# Patient Record
Sex: FEMALE | Race: WHITE | NOT HISPANIC OR LATINO | Employment: FULL TIME | URBAN - METROPOLITAN AREA
[De-identification: names, ages, dates, MRNs, and addresses within clinical notes are randomized per-mention and may not be internally consistent; named-entity substitution may affect disease eponyms.]

---

## 2017-01-05 ENCOUNTER — OFFICE VISIT (OUTPATIENT)
Dept: FAMILY MEDICINE | Facility: CLINIC | Age: 37
End: 2017-01-05
Payer: COMMERCIAL

## 2017-01-05 VITALS
WEIGHT: 157 LBS | HEART RATE: 65 BPM | DIASTOLIC BLOOD PRESSURE: 75 MMHG | BODY MASS INDEX: 29.64 KG/M2 | TEMPERATURE: 98.5 F | SYSTOLIC BLOOD PRESSURE: 126 MMHG | RESPIRATION RATE: 20 BRPM | HEIGHT: 61 IN

## 2017-01-05 DIAGNOSIS — Z00.00 ENCOUNTER FOR GENERAL ADULT MEDICAL EXAMINATION WITHOUT ABNORMAL FINDINGS: Primary | ICD-10-CM

## 2017-01-05 DIAGNOSIS — F41.1 GENERALIZED ANXIETY DISORDER: Chronic | ICD-10-CM

## 2017-01-05 DIAGNOSIS — Z12.4 SCREENING FOR MALIGNANT NEOPLASM OF CERVIX: ICD-10-CM

## 2017-01-05 DIAGNOSIS — F33.1 MAJOR DEPRESSIVE DISORDER, RECURRENT EPISODE, MODERATE (H): Chronic | ICD-10-CM

## 2017-01-05 DIAGNOSIS — I10 ESSENTIAL HYPERTENSION: ICD-10-CM

## 2017-01-05 DIAGNOSIS — F33.41 MAJOR DEPRESSIVE DISORDER, RECURRENT EPISODE, IN PARTIAL REMISSION (H): ICD-10-CM

## 2017-01-05 LAB
ANION GAP SERPL CALCULATED.3IONS-SCNC: 8 MMOL/L (ref 3–14)
BUN SERPL-MCNC: 11 MG/DL (ref 7–30)
CALCIUM SERPL-MCNC: 8.3 MG/DL (ref 8.5–10.1)
CHLORIDE SERPL-SCNC: 108 MMOL/L (ref 94–109)
CO2 SERPL-SCNC: 23 MMOL/L (ref 20–32)
CREAT SERPL-MCNC: 0.53 MG/DL (ref 0.52–1.04)
GFR SERPL CREATININE-BSD FRML MDRD: ABNORMAL ML/MIN/1.7M2
GLUCOSE SERPL-MCNC: 90 MG/DL (ref 70–99)
POTASSIUM SERPL-SCNC: 4.3 MMOL/L (ref 3.4–5.3)
SODIUM SERPL-SCNC: 139 MMOL/L (ref 133–144)
TSH SERPL DL<=0.005 MIU/L-ACNC: 1.39 MU/L (ref 0.4–4)

## 2017-01-05 PROCEDURE — 87624 HPV HI-RISK TYP POOLED RSLT: CPT | Mod: 90 | Performed by: INTERNAL MEDICINE

## 2017-01-05 PROCEDURE — G0123 SCREEN CERV/VAG THIN LAYER: HCPCS | Mod: 90 | Performed by: INTERNAL MEDICINE

## 2017-01-05 PROCEDURE — 36415 COLL VENOUS BLD VENIPUNCTURE: CPT | Performed by: INTERNAL MEDICINE

## 2017-01-05 PROCEDURE — 99000 SPECIMEN HANDLING OFFICE-LAB: CPT | Performed by: INTERNAL MEDICINE

## 2017-01-05 PROCEDURE — 99395 PREV VISIT EST AGE 18-39: CPT | Performed by: INTERNAL MEDICINE

## 2017-01-05 PROCEDURE — 84443 ASSAY THYROID STIM HORMONE: CPT | Mod: 90 | Performed by: INTERNAL MEDICINE

## 2017-01-05 PROCEDURE — 80048 BASIC METABOLIC PNL TOTAL CA: CPT | Mod: 90 | Performed by: INTERNAL MEDICINE

## 2017-01-05 RX ORDER — LISINOPRIL 10 MG/1
10 TABLET ORAL DAILY
Qty: 90 TABLET | Refills: 3 | Status: SHIPPED | OUTPATIENT
Start: 2017-01-05 | End: 2018-02-23

## 2017-01-05 RX ORDER — VENLAFAXINE HYDROCHLORIDE 75 MG/1
75 CAPSULE, EXTENDED RELEASE ORAL DAILY
Qty: 90 CAPSULE | Refills: 3 | Status: SHIPPED | OUTPATIENT
Start: 2017-01-05 | End: 2018-02-23

## 2017-01-05 ASSESSMENT — ANXIETY QUESTIONNAIRES
GAD7 TOTAL SCORE: 5
5. BEING SO RESTLESS THAT IT IS HARD TO SIT STILL: SEVERAL DAYS
IF YOU CHECKED OFF ANY PROBLEMS ON THIS QUESTIONNAIRE, HOW DIFFICULT HAVE THESE PROBLEMS MADE IT FOR YOU TO DO YOUR WORK, TAKE CARE OF THINGS AT HOME, OR GET ALONG WITH OTHER PEOPLE: SOMEWHAT DIFFICULT
1. FEELING NERVOUS, ANXIOUS, OR ON EDGE: SEVERAL DAYS
3. WORRYING TOO MUCH ABOUT DIFFERENT THINGS: SEVERAL DAYS
7. FEELING AFRAID AS IF SOMETHING AWFUL MIGHT HAPPEN: NOT AT ALL
6. BECOMING EASILY ANNOYED OR IRRITABLE: SEVERAL DAYS
2. NOT BEING ABLE TO STOP OR CONTROL WORRYING: NOT AT ALL

## 2017-01-05 ASSESSMENT — PATIENT HEALTH QUESTIONNAIRE - PHQ9: 5. POOR APPETITE OR OVEREATING: SEVERAL DAYS

## 2017-01-05 NOTE — MR AVS SNAPSHOT
After Visit Summary   1/5/2017    Cecile Lim    MRN: [id number]           Patient Information     Date Of Birth          1980        Visit Information        Provider Department      1/5/2017 7:40 AM Fawn Talley,  [facility]        Today's Diagnoses     Encounter for general adult medical examination without abnormal findings    -  1     Major depressive disorder, recurrent episode, moderate (H)         Generalized anxiety disorder         Essential hypertension         Major depressive disorder, recurrent episode, in partial remission (HCC)         Screening for malignant neoplasm of cervix           Care Instructions          Thank you for choosing [facility].  You may be receiving a survey in the mail from [employer] regarding your visit today.  Please take a few minutes to complete and return the survey to let us know how we are doing.      If you have questions or concerns, please contact us via [employer] or you can contact your care team at [phone].    Our Clinic hours are:  Monday 6:40 am  to 7:00 pm  Tuesday -Friday 6:40 am to 5:00 pm    The [place] outpatient lab hours are:  Monday - Friday 6:10 am to 4:45 pm  Saturdays 7:00 am to 11:00 am  Appointments are required, call [phone]    If you have clinical questions after hours or would like to schedule an appointment,  call the clinic at [phone].         [facility] - [place] - [address]      Preventive Health Recommendations  Female Ages 26 - 39  Yearly exam:   See your health care provider every year in order to    Review health changes.     Discuss preventive care.      Review your medicines if you your doctor has prescribed any.    Until age 30: Get a Pap test every three years (more often if you have had an abnormal result).    After age 30: Talk to your doctor about whether you should have a Pap test every 3 years or have a Pap test with HPV  screening every 5 years.   You do not need a Pap test if your uterus was removed (hysterectomy) and you have not had cancer.  You should be tested each year for STDs (sexually transmitted diseases), if you're at risk.   Talk to your provider about how often to have your cholesterol checked.  If you are at risk for diabetes, you should have a diabetes test (fasting glucose).  Shots: Get a flu shot each year. Get a tetanus shot every 10 years.   Nutrition:     Eat at least 5 servings of fruits and vegetables each day.    Eat whole-grain bread, whole-wheat pasta and brown rice instead of white grains and rice.    Talk to your provider about Calcium and Vitamin D.     Lifestyle    Exercise at least 150 minutes a week (30 minutes a day, 5 days of the week). This will help you control your weight and prevent disease.    Limit alcohol to one drink per day.    No smoking.     Wear sunscreen to prevent skin cancer.    See your dentist every six months for an exam and cleaning.      1.  Ok to continue with Effexor 75 mg once daily.  Continue to monitor the fatigue/sleep  2. Consider joining  [employer].  Work on increasing exercise and improving diet.   3. Blood work today        Follow-ups after your visit        Who to contact     If you have questions or need follow up information about today's clinic visit or your schedule please contact [facility] directly at [phone].  Normal or non-critical lab and imaging results will be communicated to you by MyChart, letter or phone within 4 business days after the clinic has received the results. If you do not hear from us within 7 days, please contact the clinic through MyChart or phone. If you have a critical or abnormal lab result, we will notify you by phone as soon as possible.  Submit refill requests through [employer] or call your pharmacy and they will forward the refill request to us. Please allow 3 business days for your refill to be completed.           "Additional Information About Your Visit        [employer] Information     [employer] gives you secure access to your electronic health record. If you see a primary care provider, you can also send messages to your care team and make appointments. If you have questions, please call your primary care clinic.  If you do not have a primary care provider, please call [phone] and they will assist you.        Care EveryWhere ID     This is your Care EveryWhere ID. This could be used by other organizations to access your [place] medical records  [id number]        Your Vitals Were     Pulse Temperature Respirations Height BMI (Body Mass Index) Last Period    65 98.5  F (36.9  C) (Tympanic) 20 5' 1\" (1.549 m) 29.68 kg/m2 12/22/2016       Blood Pressure from Last 3 Encounters:   01/05/17 126/75   03/10/16 116/74   11/12/15 119/84    Weight from Last 3 Encounters:   01/05/17 157 lb (71.215 kg)   03/10/16 157 lb (71.215 kg)   11/12/15 158 lb (71.668 kg)              We Performed the Following     Basic metabolic panel     DEPRESSION ACTION PLAN (DAP)     HPV High Risk Types DNA Cervical     Pap imaged thin layer screen with HPV - recommended age 30 - 65 years (select HPV order below)     TSH with free T4 reflex          Today's Medication Changes          These changes are accurate as of: 1/5/17  8:18 AM.  If you have any questions, ask your nurse or doctor.               These medicines have changed or have updated prescriptions.        Dose/Directions    * venlafaxine 37.5 MG 24 hr capsule   Commonly known as:  EFFEXOR-XR   This may have changed:  Another medication with the same name was changed. Make sure you understand how and when to take each.   Used for:  Generalized anxiety disorder, Major depressive disorder, recurrent episode, moderate (H)   Changed by:  Fawn Talley, DO        Take with 75 mg for total of 112.5 mg once daily   Quantity:  90 capsule   Refills:  3       * venlafaxine 75 MG 24 hr capsule "   Commonly known as:  EFFEXOR-XR   Indication:  increasing about once per week to 150 mb   This may have changed:    - how much to take  - how to take this  - when to take this  - additional instructions   Used for:  Major depressive disorder, recurrent episode, moderate (H), Generalized anxiety disorder   Changed by:  Fawn Talley DO        Dose:  75 mg   Take 1 capsule (75 mg) by mouth daily   Quantity:  90 capsule   Refills:  3       * Notice:  This list has 2 medication(s) that are the same as other medications prescribed for you. Read the directions carefully, and ask your doctor or other care provider to review them with you.         Where to get your medicines      These medications were sent to [facility] - [place] - [address]  [address], [place] [address]    Hours:  GEOVANY Mendoza [facility] Phone:  [phone]    - lisinopril 10 MG tablet  - venlafaxine 75 MG 24 hr capsule             Primary Care Provider Office Phone # Fax #    Fawn Talley [phone]       [facility] [address] [address]        Thank you!     Thank you for choosing [facility]  for your care. Our goal is always to provide you with excellent care. Hearing back from our patients is one way we can continue to improve our services. Please take a few minutes to complete the written survey that you may receive in the mail after your visit with us. Thank you!             Your Updated Medication List - Protect others around you: Learn how to safely use, store and throw away your medicines at www.disposemymeds.org.          This list is accurate as of: 1/5/17  8:18 AM.  Always use your most recent med list.                   Brand Name Dispense Instructions for use    lisinopril 10 MG tablet    PRINIVIL/ZESTRIL    90 tablet    Take 1 tablet (10 mg) by mouth daily       * venlafaxine 37.5 MG 24 hr capsule    EFFEXOR-XR     90 capsule    Take with 75 mg for total of 112.5 mg once daily       * venlafaxine 75 MG 24 hr capsule    EFFEXOR-XR    90 capsule    Take 1 capsule (75 mg) by mouth daily       * Notice:  This list has 2 medication(s) that are the same as other medications prescribed for you. Read the directions carefully, and ask your doctor or other care provider to review them with you.

## 2017-01-05 NOTE — PROGRESS NOTES
.   SUBJECTIVE:     CC: Cecile Lim is an 36 year old woman who presents for preventive health visit.     Healthy Habits:    Do you get at least three servings of calcium containing foods daily (dairy, green leafy vegetables, etc.)? no    Amount of exercise or daily activities, outside of work: 0 day(s) per week    Problems taking medications regularly No    Medication side effects: No    Have you had an eye exam in the past two years? yes    Do you see a dentist twice per year? yes    Do you have sleep apnea, excessive snoring or daytime drowsiness?no    Depression:  She decreased her effexor b/c she was sleeping more.  She is taking 75 mg only, not 112.5.  She did this 1 week ago, and no change in drowsiness or mood.  Otherwise feels her mood is stable.   She is sleeping well at night wakes up feeling refreshed, just feeling like she needs 11 hours sleep.  Working at [employer].  She is still getting periods regularly.  She feels hot all the time.      noted a lump in vaginal wall area during intercourse.  Remote history of chlamydia.  w/out history of STD as far as she knows.   is only partner x 20 years.        Today's PHQ-2 Score:   PHQ-2 ( 1999 Pfizer) 1/5/2017 3/10/2016   Q1: Little interest or pleasure in doing things 0 0   Q2: Feeling down, depressed or hopeless 0 0   PHQ-2 Score 0 0       Abuse: Current or Past(Physical, Sexual or Emotional)- No  Do you feel safe in your environment - Yes    Social History   Substance Use Topics     Smoking status: Never Smoker      Smokeless tobacco: Never Used     Alcohol Use: 0.0 oz/week     0 Standard drinks or equivalent per week      Comment: Rare. Maybe one drink a month     The patient does not drink >3 drinks per day nor >7 drinks per week.    Recent Labs   Lab Test  08/22/11   0855   CHOL  132   HDL  38*   LDL  81   TRIG  66   CHOLHDLRATIO  3.5       Reviewed orders with patient.  Reviewed health maintenance and updated orders  "accordingly - No    Mammo Decision Support:  Mammogram not appropriate for this patient based on age.    Pertinent mammograms are reviewed under the imaging tab.  History of abnormal Pap smear: NO - age 30- 65 PAP every 3 years recommended  All Histories reviewed and updated in Epic.    ROS:  C: NEGATIVE for fever, chills, change in weight  I: NEGATIVE for worrisome rashes, moles or lesions  E: NEGATIVE for vision changes or irritation  ENT: NEGATIVE for ear, mouth and throat problems  R: NEGATIVE for significant cough or SOB  B: NEGATIVE for masses, tenderness or discharge  CV: NEGATIVE for chest pain, palpitations or peripheral edema  GI: NEGATIVE for nausea, abdominal pain, heartburn, or change in bowel habits  : NEGATIVE for unusual urinary or vaginal symptoms. Periods are regular.  M: NEGATIVE for significant arthralgias or myalgia  N: NEGATIVE for weakness, dizziness or paresthesias  P: NEGATIVE for changes in mood or affect    Problem list, Medication list, Allergies, and Medical/Social/Surgical histories reviewed in [facility] and updated as appropriate.  Labs reviewed in EPIC  OBJECTIVE:     /75 mmHg  Pulse 65  Temp(Src) 98.5  F (36.9  C) (Tympanic)  Resp 20  Ht 5' 1\" (1.549 m)  Wt 157 lb (71.215 kg)  BMI 29.68 kg/m2  LMP 12/22/2016  EXAM:  GENERAL: healthy, alert and no distress  EYES: Eyes grossly normal to inspection, PERRL and conjunctivae and sclerae normal  HENT: ear canals and TM's normal, nose and mouth without ulcers or lesions  NECK: no adenopathy, no asymmetry, masses, or scars and thyroid normal to palpation  RESP: lungs clear to auscultation - no rales, rhonchi or wheezes  BREAST: normal without masses, tenderness or nipple discharge and no palpable axillary masses or adenopathy  CV: regular rate and rhythm, normal S1 S2, no S3 or S4, no murmur, click or rub, no peripheral edema and peripheral pulses strong  ABDOMEN: soft, nontender, no hepatosplenomegaly, no masses and bowel sounds " "normal   (female): normal female external genitalia, normal urethral meatus, vaginal mucosa pink, moist, well rugated, and normal cervix/adnexa/uterus without masses or discharge  MS: no gross musculoskeletal defects noted, no edema  SKIN: no suspicious lesions or rashes  NEURO: Normal strength and tone, mentation intact and speech normal  PSYCH: mentation appears normal, affect normal/bright    ASSESSMENT/PLAN:         ICD-10-CM    1. Encounter for general adult medical examination without abnormal findings Z00.00    2. Major depressive disorder, recurrent episode, moderate (H) F33.1 venlafaxine (EFFEXOR-XR) 75 MG 24 hr capsule     DEPRESSION ACTION PLAN (DAP)   3. Generalized anxiety disorder F41.1 venlafaxine (EFFEXOR-XR) 75 MG 24 hr capsule   4. Essential hypertension I10 lisinopril (PRINIVIL/ZESTRIL) 10 MG tablet     Basic metabolic panel     TSH with free T4 reflex   5. Major depressive disorder, recurrent episode, in partial remission (HCC) F33.41    6. Screening for malignant neoplasm of cervix Z12.4 Pap imaged thin layer screen with HPV - recommended age 30 - 65 years (select HPV order below)     HPV High Risk Types DNA Cervical       COUNSELING:   Reviewed preventive health counseling, as reflected in patient instructions         reports that she has never smoked. She has never used smokeless tobacco.    Estimated body mass index is 29.68 kg/(m^2) as calculated from the following:    Height as of this encounter: 5' 1\" (1.549 m).    Weight as of this encounter: 157 lb (71.215 kg).   Weight management plan: Discussed healthy diet and exercise guidelines and patient will follow up in 12 months in clinic to re-evaluate.    Counseling Resources:  ATP IV Guidelines  Pooled Cohorts Equation Calculator  Breast Cancer Risk Calculator  FRAX Risk Assessment  ICSI Preventive Guidelines  Dietary Guidelines for Americans, 2010  USDA's MyPlate  ASA Prophylaxis  Lung CA Screening    Fawn Talley, DO  [facility] " [facility]

## 2017-01-05 NOTE — Clinical Note
My Depression Action Plan  Name: Cecile Lim   Date of Birth 1980  Date: 1/5/2017    My doctor: Fawn Talley   My clinic: [facility]  [address] [address] [phone]          GREEN    ZONE   Good Control    What it looks like:     Things are going generally well. You have normal up s and down s. You may even feel depressed from time to time, but bad moods usually last less than a day.   What you need to do:  1. Continue to care for yourself (see self care plan)  2. Check your depression survival kit and update it as needed  3. Follow your physician s recommendations including any medication.  4. Do not stop taking medication unless you consult with your physician first.           YELLOW         ZONE Getting Worse    What it looks like:     Depression is starting to interfere with your life.     It may be hard to get out of bed; you may be starting to isolate yourself from others.    Symptoms of depression are starting to last most all day and this has happened for several days.     You may have suicidal thoughts but they are not constant.   What you need to do:     1. Call your care team, your response to treatment will improve if you keep your care team informed of your progress. Yellow periods are signs an adjustment may need to be made.     2. Continue your self-care, even if you have to fake it!    3. Talk to someone in your support network    4. Open up your depression survival kit           RED    ZONE Medical Alert - Get Help    What it looks like:     Depression is seriously interfering with your life.     You may experience these or other symptoms: You can t get out of bed most days, can t work or engage in other necessary activities, you have trouble taking care of basic hygiene, or basic responsibilities, thoughts of suicide or death that will not go away, self-injurious behavior.     What you need to do:  1. Call your care  team and request a same-day appointment. If they are not available (weekends or after hours) call your local crisis line, emergency room or 911.      Electronically signed by: Ludivina Ramey, January 5, 2017    Depression Self Care Plan / Survival Kit    Self-Care for Depression  Here s the deal. Your body and mind are really not as separate as most people think.  What you do and think affects how you feel and how you feel influences what you do and think. This means if you do things that people who feel good do, it will help you feel better.  Sometimes this is all it takes.  There is also a place for medication and therapy depending on how severe your depression is, so be sure to consult with your medical provider and/ or Behavioral Health Consultant if your symptoms are worsening or not improving.     In order to better manage my stress, I will:    Exercise  Get some form of exercise, every day. This will help reduce pain and release endorphins, the  feel good  chemicals in your brain. This is almost as good as taking antidepressants!  This is not the same as joining a gym and then never going! (they count on that by the way ) It can be as simple as just going for a walk or doing some gardening, anything that will get you moving.      Hygiene   Maintain good hygiene (Get out of bed in the morning, Make your bed, Brush your teeth, Take a shower, and Get dressed like you were going to work, even if you are unemployed).  If your clothes don't fit try to get ones that do.    Diet  I will strive to eat foods that are good for me, drink plenty of water, and avoid excessive sugar, caffeine, alcohol, and other mood-altering substances.  Some foods that are helpful in depression are: complex carbohydrates, B vitamins, flaxseed, fish or fish oil, fresh fruits and vegetables.    Psychotherapy  I agree to participate in Individual Therapy (if recommended).    Medication  If prescribed medications, I agree to take them.   Missing doses can result in serious side effects.  I understand that drinking alcohol, or other illicit drug use, may cause potential side effects.  I will not stop my medication abruptly without first discussing it with my provider.    Staying Connected With Others  I will stay in touch with my friends, family members, and my primary care provider/team.    Use your imagination  Be creative.  We all have a creative side; it doesn t matter if it s oil painting, sand castles, or mud pies! This will also kick up the endorphins.    [employer]  (AKA stop and smell the roses) Take a look outside, even in mid-winter. Notice colors, textures. Watch the squirrels and birds.     Service to others  Be of service to others.  There is always someone else in need.  By helping others we can  get out of ourselves  and remember the really important things.  This also provides opportunities for practicing all the other parts of the program.    Humor  Laugh and be silly!  Adjust your TV habits for less news and crime-drama and more comedy.    Control your stress  Try breathing deep, massage therapy, biofeedback, and meditation. Find time to relax each day.     My support system    Clinic Contact:  Phone number:    Contact 1:  Phone number:    Contact 2:  Phone number:    Druze/:  Phone number:    Therapist:  Phone number:    Local crisis center:    Phone number:    Other community support:  Phone number:

## 2017-01-05 NOTE — PATIENT INSTRUCTIONS
Thank you for choosing [facility].  You may be receiving a survey in the mail from [employer] regarding your visit today.  Please take a few minutes to complete and return the survey to let us know how we are doing.      If you have questions or concerns, please contact us via [email or web address] or you can contact your care team at [phone].    Our Clinic hours are:  Monday 6:40 am  to 7:00 pm  Tuesday -Friday 6:40 am to 5:00 pm    The [place] outpatient lab hours are:  Monday - Friday 6:10 am to 4:45 pm  Saturdays 7:00 am to 11:00 am  Appointments are required, call [phone]    If you have clinical questions after hours or would like to schedule an appointment,  call the clinic at [phone].         [facility] - [place] - [address]      Preventive Health Recommendations  Female Ages 26 - 39  Yearly exam:   See your health care provider every year in order to    Review health changes.     Discuss preventive care.      Review your medicines if you your doctor has prescribed any.    Until age 30: Get a Pap test every three years (more often if you have had an abnormal result).    After age 30: Talk to your doctor about whether you should have a Pap test every 3 years or have a Pap test with HPV screening every 5 years.   You do not need a Pap test if your uterus was removed (hysterectomy) and you have not had cancer.  You should be tested each year for STDs (sexually transmitted diseases), if you're at risk.   Talk to your provider about how often to have your cholesterol checked.  If you are at risk for diabetes, you should have a diabetes test (fasting glucose).  Shots: Get a flu shot each year. Get a tetanus shot every 10 years.   Nutrition:     Eat at least 5 servings of fruits and vegetables each day.    Eat whole-grain bread, whole-wheat pasta and brown rice instead of white grains and rice.    Talk to your provider about Calcium and Vitamin D.      Lifestyle    Exercise at least 150 minutes a week (30 minutes a day, 5 days of the week). This will help you control your weight and prevent disease.    Limit alcohol to one drink per day.    No smoking.     Wear sunscreen to prevent skin cancer.    See your dentist every six months for an exam and cleaning.      1.  Ok to continue with Effexor 75 mg once daily.  Continue to monitor the fatigue/sleep  2. Consider joining  [email or web address].  Work on increasing exercise and improving diet.   3. Blood work today

## 2017-01-05 NOTE — Clinical Note
[facility]  [address] [address]  Phone: [phone]    January 5, 2017    Cecile Lim  [address] [facility] [address]          Dear Ms. Lim,    The results of your recent lab tests were within normal limits.  Component      Latest Ref Rng 1/5/2017   Sodium      133 - 144 mmol/L 139   Potassium      3.4 - 5.3 mmol/L 4.3   Chloride      94 - 109 mmol/L 108   Carbon Dioxide      20 - 32 mmol/L 23   Anion Gap      3 - 14 mmol/L 8   Glucose      70 - 99 mg/dL 90   Urea Nitrogen      7 - 30 mg/dL 11   Creatinine      0.52 - 1.04 mg/dL 0.53   GFR Estimate      >60 mL/min/1.7m2 >90 . . .   GFR Estimate If Black      >60 mL/min/1.7m2 >90 . . .   Calcium      8.5 - 10.1 mg/dL 8.3 (L)   TSH      0.40 - 4.00 mU/L 1.39      If you have any further questions or problems, please contact our office.    Sincerely,      Fawn Talley MD / gary

## 2017-01-06 ASSESSMENT — PATIENT HEALTH QUESTIONNAIRE - PHQ9: SUM OF ALL RESPONSES TO PHQ QUESTIONS 1-9: 8

## 2017-01-06 ASSESSMENT — ANXIETY QUESTIONNAIRES: GAD7 TOTAL SCORE: 5

## 2017-01-09 LAB
COPATH REPORT: NORMAL
PAP: NORMAL

## 2017-01-10 LAB
FINAL DIAGNOSIS: NORMAL
HPV HR 12 DNA CVX QL NAA+PROBE: NEGATIVE
HPV16 DNA SPEC QL NAA+PROBE: NEGATIVE
HPV18 DNA SPEC QL NAA+PROBE: NEGATIVE
SPECIMEN DESCRIPTION: NORMAL

## 2017-06-06 DIAGNOSIS — F41.1 GENERALIZED ANXIETY DISORDER: Chronic | ICD-10-CM

## 2017-06-06 DIAGNOSIS — F33.1 MAJOR DEPRESSIVE DISORDER, RECURRENT EPISODE, MODERATE (H): Chronic | ICD-10-CM

## 2017-06-06 RX ORDER — VENLAFAXINE HYDROCHLORIDE 37.5 MG/1
CAPSULE, EXTENDED RELEASE ORAL
Qty: 90 CAPSULE | Refills: 3 | Status: SHIPPED | OUTPATIENT
Start: 2017-06-06 | End: 2018-02-23

## 2017-06-06 NOTE — TELEPHONE ENCOUNTER
Pt reports that she was taking Effexor 75mg when she saw you last on 1/5/17. This was decreased for the previous dose of 112.5mg. After seeing you, she reports returning to her previous dose of 112.mg. Pt reports taking this dose for approx 2 months. She has updated PHQ-9 today. She does have enough of the 75 mg dose, but will run out of the additional 37.5 mg dose.     Please review and advise.   Thank you,  Lisbet Herr RN

## 2017-06-07 ASSESSMENT — PATIENT HEALTH QUESTIONNAIRE - PHQ9: SUM OF ALL RESPONSES TO PHQ QUESTIONS 1-9: 5

## 2017-11-04 ENCOUNTER — HOSPITAL ENCOUNTER (EMERGENCY)
Facility: CLINIC | Age: 37
Discharge: HOME | End: 2017-11-04
Attending: NURSE PRACTITIONER | Admitting: NURSE PRACTITIONER
Payer: COMMERCIAL

## 2017-11-04 VITALS
TEMPERATURE: 98.2 F | BODY MASS INDEX: 30.23 KG/M2 | WEIGHT: 160 LBS | OXYGEN SATURATION: 99 % | RESPIRATION RATE: 14 BRPM | HEART RATE: 63 BPM

## 2017-11-04 DIAGNOSIS — J02.0 ACUTE STREPTOCOCCAL PHARYNGITIS: Primary | ICD-10-CM

## 2017-11-04 LAB
INTERNAL QC OK POCT: YES
S PYO AG THROAT QL IA.RAPID: POSITIVE

## 2017-11-04 PROCEDURE — 99213 OFFICE O/P EST LOW 20 MIN: CPT

## 2017-11-04 PROCEDURE — 87880 STREP A ASSAY W/OPTIC: CPT | Performed by: NURSE PRACTITIONER

## 2017-11-04 PROCEDURE — 99213 OFFICE O/P EST LOW 20 MIN: CPT | Mod: Z6 | Performed by: NURSE PRACTITIONER

## 2017-11-04 RX ORDER — PENICILLIN V POTASSIUM 500 MG/1
500 TABLET, FILM COATED ORAL 3 TIMES DAILY
Qty: 30 TABLET | Refills: 0 | Status: SHIPPED | OUTPATIENT
Start: 2017-11-04 | End: 2017-11-14

## 2017-11-04 NOTE — ED PROVIDER NOTES
History     Chief Complaint   Patient presents with     Pharyngitis     daughter with strep last week. No fevers.      HPI  Cecile Lim is a 36 year old female who presents with sore throat, fatigue, feverish, aches, headache, and chills for the past couple of days.  PT reports her daughter had strep one week ago.  PT denies change in bowel or bladder function and also denies chest pain, shortness of air.    Problem List:    Patient Active Problem List    Diagnosis Date Noted     Encounter for surveillance of contraceptives 03/10/2016     Priority: Medium      has had vasectomy       Lipoma of back 11/12/2015     Priority: Medium     Contraception 06/25/2015     Priority: Medium      had vasectomy       Major depressive disorder, recurrent episode, in partial remission (HCC) 12/23/2014     Priority: Medium     Essential hypertension 09/18/2012     Priority: Medium     Stress incontinence, female 02/27/2012     Priority: Medium     CARDIOVASCULAR SCREENING; LDL GOAL LESS THAN 160 10/31/2010     Priority: Medium     Generalized anxiety disorder 10/20/2010     Priority: Medium     Diagnosis updated by automated process. Provider to review and confirm.       Ureteral stone 07/09/2009     Priority: Medium        Past Medical History:    Past Medical History:   Diagnosis Date     LAURA (generalised anxiety disorder)      HTN (hypertension)      Suicide attempt 11/2014       Past Surgical History:    Past Surgical History:   Procedure Laterality Date     NO HISTORY OF SURGERY  1/2007       Family History:    Family History   Problem Relation Age of Onset     Hypertension Mother      Musculoskeletal Disorder Mother      degenerative disc disease     Thyroid Disease Mother      Depression Mother      CEREBROVASCULAR DISEASE Mother      Hypertension Father      CEREBROVASCULAR DISEASE Father      age 40     Hypertension Maternal Grandfather      Cardiovascular Maternal Grandfather      CANCER Paternal  Grandfather      lung     Asthma No family hx of      C.A.D. No family hx of      DIABETES No family hx of      Breast Cancer No family hx of      Cancer - colorectal No family hx of      Prostate Cancer No family hx of        Social History:  Marital Status:   [2]  Social History   Substance Use Topics     Smoking status: Never Smoker     Smokeless tobacco: Never Used     Alcohol use 0.0 oz/week     0 Standard drinks or equivalent per week      Comment: Rare. Maybe one drink a month        Medications:      penicillin V potassium (VEETID) 500 MG tablet   venlafaxine (EFFEXOR-XR) 37.5 MG 24 hr capsule   venlafaxine (EFFEXOR-XR) 75 MG 24 hr capsule   lisinopril (PRINIVIL/ZESTRIL) 10 MG tablet         Review of Systems    CONSTITUTIONAL:POSITIVE  for feverish, aches, chills  INTEGUMENTARY/SKIN: NEGATIVE for worrisome rashes, moles or lesions  ENT/MOUTH: POSITIVE for sore throat  R: NEGATIVE for significant cough or SOB  CV: NEGATIVE for chest pain, palpitations or peripheral edema    Physical Exam   Pulse: 63  Temp: 98.2  F (36.8  C)  Resp: 14  Weight: 72.6 kg (160 lb)  SpO2: 99 %    Physical Exam   Constitutional: She is oriented to person, place, and time. She appears well-developed and well-nourished. No distress.   HENT:   Head: Normocephalic and atraumatic.   Right Ear: Hearing, tympanic membrane, external ear and ear canal normal.   Left Ear: Hearing, tympanic membrane, external ear and ear canal normal.   Nose: Nose normal.   Mouth/Throat: Uvula is midline and mucous membranes are normal. Posterior oropharyngeal erythema present.   Eyes: Conjunctivae are normal. Right eye exhibits no discharge. Left eye exhibits no discharge.   Neck: Normal range of motion. Neck supple.   Cardiovascular: Normal rate, regular rhythm and normal heart sounds.  Exam reveals no gallop and no friction rub.    No murmur heard.  Pulmonary/Chest: Effort normal and breath sounds normal. No respiratory distress. She has no wheezes.  She has no rales. She exhibits no tenderness.   Lymphadenopathy:     She has cervical adenopathy (mild anterior chain lymphadenopathy).   Neurological: She is alert and oriented to person, place, and time.   Skin: Skin is warm and dry. No rash noted. She is not diaphoretic. No erythema. No pallor.   Psychiatric: She has a normal mood and affect.   Nursing note and vitals reviewed.      ED Course     ED Course     Procedures    Labs Ordered and Resulted from Time of ED Arrival Up to the Time of Departure from the ED   RAPID STREP GROUP A SCREEN POCT - Abnormal; Notable for the following:      Results for orders placed or performed during the hospital encounter of 11/04/17   Rapid strep group A screen POCT   Result Value Ref Range    Rapid Strep A Screen POSITIVE neg    Internal QC OK Yes        Assessments & Plan (with Medical Decision Making)     I have reviewed the nursing notes.    I have reviewed the findings, diagnosis, plan and need for follow up with the patient.  Cecile Lim is a 36 year old female who presents with sore throat, fatigue, feverish, aches, headache, and chills for the past couple of days.  PT reports her daughter had strep one week ago.  PT denies change in bowel or bladder function and also denies chest pain, shortness of air.  Exam as noted above.  Quick strep positive.  Treated for strep.  DDx:  Strep pharyngitis, viral pharyngitis, URI, peritonsillar abscess, retropharyngeal abscess, mono, epiglottitis     Discharge Medication List as of 11/4/2017 12:56 PM      START taking these medications    Details   penicillin V potassium (VEETID) 500 MG tablet Take 1 tablet (500 mg) by mouth 3 times daily for 10 days, Disp-30 tablet, R-0, E-Prescribe             Final diagnoses:   Acute streptococcal pharyngitis       11/4/2017   [facility] EMERGENCY DEPARTMENT     Fawn Chirinos, JOAQUIN CNP  11/04/17 3134

## 2017-11-04 NOTE — ED AVS SNAPSHOT
[facility] Emergency Department    [address] [facility] [address]    Phone:  [phone]    Fax:  [phone]                                       Cecile Lim   MRN: [id number]    Department:  [facility] Emergency Department   Date of Visit:  11/4/2017           After Visit Summary Signature Page     I have received my discharge instructions, and my questions have been answered. I have discussed any challenges I see with this plan with the nurse or doctor.    ..........................................................................................................................................  Patient/Patient Representative Signature      ..........................................................................................................................................  Patient Representative Print Name and Relationship to Patient    ..................................................               ................................................  Date                                            Time    ..........................................................................................................................................  Reviewed by Signature/Title    ...................................................              ..............................................  Date                                                            Time

## 2017-11-04 NOTE — DISCHARGE INSTRUCTIONS
When You Have a Sore Throat    A sore throat can be painful. There are many reasons why you may have a sore throat. Your healthcare provider will work with you to find the cause of your sore throat. He or she will also find the best treatment for you.  What causes a sore throat?  Sore throats can be caused or worsened by:    Cold or flu viruses    Bacteria    Irritants such as tobacco smoke or air pollution    Acid reflux  A healthy throat  The tonsils are on the sides of the throat near the base of the tongue. They collect viruses and bacteria and help fight infection. The throat (pharynx) is the passage for air. Mucus from the nasal cavity also moves down the passage.  An inflamed throat  The tonsils and pharynx can become inflamed due to a cold or flu virus. Postnasal drip (excess mucus draining from the nasal cavity) can irritate the throat. It can also make the throat or tonsils more likely to be infected by bacteria. Severe, untreated tonsillitis in children or adults can cause a pocket of pus (abscess) to form near the tonsil.  Your evaluation  A medical evaluation can help find the cause of your sore throat. It can also help your healthcare provider choose the best treatment for you. The evaluation may include a health history, physical exam, and diagnostic tests.  Health history  Your healthcare provider may ask you the following:    How long has the sore throat lasted and how have you been treating it?    Do you have any other symptoms, such as body aches, fever, or cough?    Does your sore throat recur? If so, how often? How many days of school or work have you missed because of a sore throat?    Do you have trouble eating or swallowing?    Have you been told that you snore or have other sleep problems?    Do you have bad breath?    Do you cough up bad-tasting mucus?  Physical exam  During the exam, your healthcare provider checks your ears, nose, and throat for problems. He or she also checks for  "swelling in the neck, and may listen to your chest.  Possible tests  Other tests your healthcare provider may perform include:    A throat swab to check for bacteria such as streptococcus (the bacteria that causes strep throat)    A blood test to check for mononucleosis (a viral infection)    A chest X-ray to rule out pneumonia, especially if you have a cough  Treating a sore throat  Treatment depends on many factors. What is the likely cause? Is the problem recent? Does it keep coming back? In many cases, the best thing to do is to treat the symptoms, rest, and let the problem heal itself. Antibiotics may help clear up some bacterial infections. For cases of severe or recurring tonsillitis, the tonsils may need to be removed.  Relieving your symptoms    Don t smoke, and avoid secondhand smoke.    For children, try throat sprays or Popsicles. Adults and older children may try lozenges.    Drink warm liquids to soothe the throat and help thin mucus. Avoid alcohol, spicy foods, and acidic drinks such as orange juice. These can irritate the throat.    Gargle with warm saltwater (1 teaspoon of salt to 8 ounces of warm water).    Use a humidifier to keep air moist and relieve throat dryness.    Try over-the-counter pain relievers such as acetaminophen or ibuprofen. Use as directed, and don t exceed the recommended dose. Don t give aspirin to children.   Are antibiotics needed?  If your sore throat is due to a bacterial infection, antibiotics may speed healing and prevent complications. Although group A streptococcus (\"strep throat\" or GAS) is the major treatable infection for a sore throat, GAS causes only 5% to 15% of sore throats in adults who seek medical care. Most sore throats are caused by cold or flu viruses. And antibiotics don t treat viral illness. In fact, using antibiotics when they re not needed may produce bacteria that are harder to kill. Your healthcare provider will prescribe antibiotics only if he or " she thinks they are likely to help.  If antibiotics are prescribed  Take the medicine exactly as directed. Be sure to finish your prescription even if you re feeling better. And be sure to ask your healthcare provider or pharmacist what side effects are common and what to do about them.  Is surgery needed?  In some cases, tonsils need to be removed. This is often done as outpatient (same-day) surgery. Your healthcare provider may advise removing the tonsils in cases of:    Several severe bouts of tonsillitis in a year.  Severe  episodes include those that lead to missed days of school or work, or that need to be treated with antibiotics.    Tonsillitis that causes breathing problems during sleep    Tonsillitis caused by food particles collecting in pouches in the tonsils (cryptic tonsillitis)  Call your healthcare provider if any of the following occur:    Symptoms worsen, or new symptoms develop.    Swollen tonsils make breathing difficult.    The pain is severe enough to keep you from drinking liquids.    A skin rash, hives, or wheezing develops. Any of these could signal an allergic reaction to antibiotics.    Symptoms don t improve within a week.    Symptoms don t improve within 2 to 3 days of starting antibiotics.   Date Last Reviewed: 10/1/2016    6085-3459 [employer]. [address] [place] [address], [place] [address]. All rights reserved. This information is not intended as a substitute for professional medical care. Always follow your healthcare professional's instructions.

## 2017-11-04 NOTE — ED AVS SNAPSHOT
[facility] Emergency Department    [address] [facility] [address]    Phone:  [phone]    Fax:  [phone]                                       Cecile Lim   MRN: [id number]    Department:  [facility] Emergency Department   Date of Visit:  11/4/2017           Patient Information     Date Of Birth          1980        Your diagnoses for this visit were:     Acute streptococcal pharyngitis        You were seen by Fawn Chirinos APRN CNP.      Follow-up Information     Follow up with Fawn Talley DO.    Specialty:  Internal Medicine    Why:  As needed, If symptoms worsen    Contact information:    [address] [facility] [id number] [phone]          Discharge Instructions         When You Have a Sore Throat    A sore throat can be painful. There are many reasons why you may have a sore throat. Your healthcare provider will work with you to find the cause of your sore throat. He or she will also find the best treatment for you.  What causes a sore throat?  Sore throats can be caused or worsened by:    Cold or flu viruses    Bacteria    Irritants such as tobacco smoke or air pollution    Acid reflux  A healthy throat  The tonsils are on the sides of the throat near the base of the tongue. They collect viruses and bacteria and help fight infection. The throat (pharynx) is the passage for air. Mucus from the nasal cavity also moves down the passage.  An inflamed throat  The tonsils and pharynx can become inflamed due to a cold or flu virus. Postnasal drip (excess mucus draining from the nasal cavity) can irritate the throat. It can also make the throat or tonsils more likely to be infected by bacteria. Severe, untreated tonsillitis in children or adults can cause a pocket of pus (abscess) to form near the tonsil.  Your evaluation  A medical evaluation can help find the cause of your sore throat. It can also help your healthcare provider choose the best  treatment for you. The evaluation may include a health history, physical exam, and diagnostic tests.  Health history  Your healthcare provider may ask you the following:    How long has the sore throat lasted and how have you been treating it?    Do you have any other symptoms, such as body aches, fever, or cough?    Does your sore throat recur? If so, how often? How many days of school or work have you missed because of a sore throat?    Do you have trouble eating or swallowing?    Have you been told that you snore or have other sleep problems?    Do you have bad breath?    Do you cough up bad-tasting mucus?  Physical exam  During the exam, your healthcare provider checks your ears, nose, and throat for problems. He or she also checks for swelling in the neck, and may listen to your chest.  Possible tests  Other tests your healthcare provider may perform include:    A throat swab to check for bacteria such as streptococcus (the bacteria that causes strep throat)    A blood test to check for mononucleosis (a viral infection)    A chest X-ray to rule out pneumonia, especially if you have a cough  Treating a sore throat  Treatment depends on many factors. What is the likely cause? Is the problem recent? Does it keep coming back? In many cases, the best thing to do is to treat the symptoms, rest, and let the problem heal itself. Antibiotics may help clear up some bacterial infections. For cases of severe or recurring tonsillitis, the tonsils may need to be removed.  Relieving your symptoms    Don t smoke, and avoid secondhand smoke.    For children, try throat sprays or Popsicles. Adults and older children may try lozenges.    Drink warm liquids to soothe the throat and help thin mucus. Avoid alcohol, spicy foods, and acidic drinks such as orange juice. These can irritate the throat.    Gargle with warm saltwater (1 teaspoon of salt to 8 ounces of warm water).    Use a humidifier to keep air moist and relieve throat  "dryness.    Try over-the-counter pain relievers such as acetaminophen or ibuprofen. Use as directed, and don t exceed the recommended dose. Don t give aspirin to children.   Are antibiotics needed?  If your sore throat is due to a bacterial infection, antibiotics may speed healing and prevent complications. Although group A streptococcus (\"strep throat\" or GAS) is the major treatable infection for a sore throat, GAS causes only 5% to 15% of sore throats in adults who seek medical care. Most sore throats are caused by cold or flu viruses. And antibiotics don t treat viral illness. In fact, using antibiotics when they re not needed may produce bacteria that are harder to kill. Your healthcare provider will prescribe antibiotics only if he or she thinks they are likely to help.  If antibiotics are prescribed  Take the medicine exactly as directed. Be sure to finish your prescription even if you re feeling better. And be sure to ask your healthcare provider or pharmacist what side effects are common and what to do about them.  Is surgery needed?  In some cases, tonsils need to be removed. This is often done as outpatient (same-day) surgery. Your healthcare provider may advise removing the tonsils in cases of:    Several severe bouts of tonsillitis in a year.  Severe  episodes include those that lead to missed days of school or work, or that need to be treated with antibiotics.    Tonsillitis that causes breathing problems during sleep    Tonsillitis caused by food particles collecting in pouches in the tonsils (cryptic tonsillitis)  Call your healthcare provider if any of the following occur:    Symptoms worsen, or new symptoms develop.    Swollen tonsils make breathing difficult.    The pain is severe enough to keep you from drinking liquids.    A skin rash, hives, or wheezing develops. Any of these could signal an allergic reaction to antibiotics.    Symptoms don t improve within a week.    Symptoms don t improve " within 2 to 3 days of starting antibiotics.   Date Last Reviewed: 10/1/2016    6390-3203 [employer]. [address] [place] [address], [place] [address]. All rights reserved. This information is not intended as a substitute for professional medical care. Always follow your healthcare professional's instructions.          24 Hour Appointment Hotline       To make an appointment at any [facility] clinic, call [phone] ([phone]). If you don't have a family doctor or clinic, we will help you find one. [facility] clinics are conveniently located to serve the needs of you and your family.             Review of your medicines      START taking        Dose / Directions Last dose taken    penicillin V potassium 500 MG tablet   Commonly known as:  VEETID   Dose:  500 mg   Quantity:  30 tablet        Take 1 tablet (500 mg) by mouth 3 times daily for 10 days   Refills:  0          Our records show that you are taking the medicines listed below. If these are incorrect, please call your family doctor or clinic.        Dose / Directions Last dose taken    lisinopril 10 MG tablet   Commonly known as:  PRINIVIL/ZESTRIL   Dose:  10 mg   Quantity:  90 tablet        Take 1 tablet (10 mg) by mouth daily   Refills:  3        * venlafaxine 75 MG 24 hr capsule   Commonly known as:  EFFEXOR-XR   Dose:  75 mg   Indication:  increasing about once per week to 150 mb   Quantity:  90 capsule        Take 1 capsule (75 mg) by mouth daily   Refills:  3        * venlafaxine 37.5 MG 24 hr capsule   Commonly known as:  EFFEXOR-XR   Quantity:  90 capsule        Take with 75 mg for total of 112.5 mg once daily   Refills:  3        * Notice:  This list has 2 medication(s) that are the same as other medications prescribed for you. Read the directions carefully, and ask your doctor or other care provider to review them with you.            Prescriptions were sent or printed at these locations (1 Prescription)                   [facility] [place], [place] - [address] [facility]   [address] [facility] [address]    Telephone:  [phone]   Fax:  [phone]   Hours:                  E-Prescribed (1 of 1)         penicillin V potassium (VEETID) 500 MG tablet                Procedures and tests performed during your visit     Rapid strep group A screen POCT      Orders Needing Specimen Collection     None      Pending Results     No orders found from 11/2/2017 to 11/5/2017.            Pending Culture Results     No orders found from 11/2/2017 to 11/5/2017.            Pending Results Instructions     If you had any lab results that were not finalized at the time of your Discharge, you can call the ED Lab Result RN at [phone]. You will be contacted by this team for any positive Lab results or changes in treatment. The nurses are available 7 days a week from 10A to 6:30P.  You can leave a message 24 hours per day and they will return your call.        Test Results From Your Hospital Stay        11/4/2017 12:23 PM      Component Results     Component Value Ref Range & Units Status    Rapid Strep A Screen POSITIVE neg Final    Internal QC OK Yes  Final                Thank you for choosing [facility]       Thank you for choosing [facility] for your care. Our goal is always to provide you with excellent care. Hearing back from our patients is one way we can continue to improve our services. Please take a few minutes to complete the written survey that you may receive in the mail after you visit with us. Thank you!        [employer] Information     [employer] gives you secure access to your electronic health record. If you see a primary care provider, you can also send messages to your care team and make appointments. If you have questions, please call your primary care clinic.  If you do not have a primary care provider, please call [phone] and they will assist you.        Care EveryWhere ID     This is your Care EveryWhere ID. This could  be used by other organizations to access your [facility] medical records  [id number]        Equal Access to Services     CARYL JON : Hadii delmy Camargo, santo cool, krista hamilton. So [facility] [phone].    ATENCIÓN: Si habla español, tiene a everett disposición servicios gratuitos de asistencia lingüística. Llame al [phone].    We comply with applicable federal civil rights laws and [place] laws. We do not discriminate on the basis of race, color, national origin, age, disability, sex, sexual orientation, or gender identity.            After Visit Summary       This is your record. Keep this with you and show to your community pharmacist(s) and doctor(s) at your next visit.

## 2017-12-03 ENCOUNTER — NURSE TRIAGE (OUTPATIENT)
Dept: NURSING | Facility: CLINIC | Age: 37
End: 2017-12-03

## 2017-12-03 ENCOUNTER — HOSPITAL ENCOUNTER (EMERGENCY)
Facility: CLINIC | Age: 37
Discharge: HOME | End: 2017-12-03
Attending: NURSE PRACTITIONER | Admitting: NURSE PRACTITIONER
Payer: COMMERCIAL

## 2017-12-03 VITALS
HEART RATE: 69 BPM | RESPIRATION RATE: 18 BRPM | OXYGEN SATURATION: 98 % | TEMPERATURE: 98.4 F | SYSTOLIC BLOOD PRESSURE: 149 MMHG | DIASTOLIC BLOOD PRESSURE: 97 MMHG

## 2017-12-03 DIAGNOSIS — J02.9 VIRAL PHARYNGITIS: Primary | ICD-10-CM

## 2017-12-03 LAB
INTERNAL QC OK POCT: YES
S PYO AG THROAT QL IA.RAPID: NEGATIVE

## 2017-12-03 PROCEDURE — 87880 STREP A ASSAY W/OPTIC: CPT | Performed by: NURSE PRACTITIONER

## 2017-12-03 PROCEDURE — 99213 OFFICE O/P EST LOW 20 MIN: CPT

## 2017-12-03 PROCEDURE — 87081 CULTURE SCREEN ONLY: CPT | Performed by: NURSE PRACTITIONER

## 2017-12-03 PROCEDURE — 99213 OFFICE O/P EST LOW 20 MIN: CPT | Performed by: NURSE PRACTITIONER

## 2017-12-03 RX ORDER — CLINDAMYCIN HCL 300 MG
300 CAPSULE ORAL 3 TIMES DAILY
Qty: 30 CAPSULE | Refills: 0 | Status: SHIPPED | OUTPATIENT
Start: 2017-12-03 | End: 2017-12-13

## 2017-12-03 ASSESSMENT — ENCOUNTER SYMPTOMS
ACTIVITY CHANGE: 1
RESPIRATORY NEGATIVE: 1
SORE THROAT: 1
CARDIOVASCULAR NEGATIVE: 1
FATIGUE: 1
APPETITE CHANGE: 1

## 2017-12-03 NOTE — ED AVS SNAPSHOT
[facility] Emergency Department    [address] [address]    Phone:  [phone]    Fax:  [phone]                                       Cecile Lim   MRN: [id number]    Department:  [facility] Emergency Department   Date of Visit:  12/3/2017           After Visit Summary Signature Page     I have received my discharge instructions, and my questions have been answered. I have discussed any challenges I see with this plan with the nurse or doctor.    ..........................................................................................................................................  Patient/Patient Representative Signature      ..........................................................................................................................................  Patient Representative Print Name and Relationship to Patient    ..................................................               ................................................  Date                                            Time    ..........................................................................................................................................  Reviewed by Signature/Title    ...................................................              ..............................................  Date                                                            Time

## 2017-12-03 NOTE — ED AVS SNAPSHOT
[facility] Emergency Department    [address] [address]    Phone:  [phone]    Fax:  [phone]                                       Cecile Lim   MRN: [id number]    Department:  [facility] Emergency Department   Date of Visit:  12/3/2017           Patient Information     Date Of Birth          1980        Your diagnoses for this visit were:     Viral pharyngitis        You were seen by Fawn Chirinos APRN CNP.      Follow-up Information     Schedule an appointment as soon as possible for a visit with Fawn Talley DO.    Specialty:  Internal Medicine    Why:  As needed, If symptoms worsen    Contact information:    [address] [address]  [phone]          Discharge Instructions         Due to Dina having positive strep at home I will prescribe the clindamycin and take this three times daily for 10 days.    Self-Care for Sore Throats    Sore throats happen for many reasons, such as colds, allergies, and infections caused by viruses or bacteria. In any case, your throat becomes red and sore. Your goal for self-care is to reduce your discomfort while giving your throat a chance to heal.  Moisten and soothe your throat  Tips include the following:    Try a sip of water first thing after waking up.    Keep your throat moist by drinking 6 or more glasses of clear liquids every day.    Run a cool-air humidifier in your room overnight.    Avoid cigarette smoke.     Suck on throat lozenges, cough drops, hard candy, ice chips, or frozen fruit-juice bars. Use the sugar-free versions if your diet or medical condition requires them.  Gargle to ease irritation  Gargling every hour or 2 can ease irritation. Try gargling with 1 of these solutions:    1/4 teaspoon of salt in 1/2 cup of warm water    An over-the-counter anesthetic gargle  Use medicine for more relief  Over-the-counter medicine can reduce sore throat symptoms. Ask your pharmacist  if you have questions about which medicine to use:    Ease pain with anesthetic sprays. Aspirin or an aspirin substitute also helps. Remember, never give aspirin to anyone 18 or younger, or if you are already taking blood thinners.     For sore throats caused by allergies, try antihistamines to block the allergic reaction.    Remember: unless a sore throat is caused by a bacterial infection, antibiotics won t help you.  Prevent future sore throats  Prevention tips include the following:    Stop smoking or reduce contact with secondhand smoke. Smoke irritates the tender throat lining.    Limit contact with pets and with allergy-causing substances, such as pollen and mold.    When you re around someone with a sore throat or cold, wash your hands often to keep viruses or bacteria from spreading.    Don t strain your vocal cords.  Call your healthcare provider  Contact your healthcare provider if you have:    A temperature over 101 F (38.3 C)    White spots on the throat    Great difficulty swallowing    Trouble breathing    A skin rash    Recent exposure to someone else with strep bacteria    Severe hoarseness and swollen glands in the neck or jaw   Date Last Reviewed: 8/1/2016 2000-2017 [employer]. [address] [place] [address]. All rights reserved. This information is not intended as a substitute for professional medical care. Always follow your healthcare professional's instructions.          24 Hour Appointment Hotline       To make an appointment at any [facility], call [phone] ([phone]). If you don't have a family doctor or clinic, we will help you find one. [facility] clinics are conveniently located to serve the needs of you and your family.             Review of your medicines      START taking        Dose / Directions Last dose taken    clindamycin 300 MG capsule   Commonly known as:  CLEOCIN   Dose:  300 mg   Quantity:  30 capsule        Take 1 capsule (300 mg)  by mouth 3 times daily for 10 days   Refills:  0          Our records show that you are taking the medicines listed below. If these are incorrect, please call your family doctor or clinic.        Dose / Directions Last dose taken    lisinopril 10 MG tablet   Commonly known as:  PRINIVIL/ZESTRIL   Dose:  10 mg   Quantity:  90 tablet        Take 1 tablet (10 mg) by mouth daily   Refills:  3        * venlafaxine 75 MG 24 hr capsule   Commonly known as:  EFFEXOR-XR   Dose:  75 mg   Indication:  increasing about once per week to 150 mb   Quantity:  90 capsule        Take 1 capsule (75 mg) by mouth daily   Refills:  3        * venlafaxine 37.5 MG 24 hr capsule   Commonly known as:  EFFEXOR-XR   Quantity:  90 capsule        Take with 75 mg for total of 112.5 mg once daily   Refills:  3        * Notice:  This list has 2 medication(s) that are the same as other medications prescribed for you. Read the directions carefully, and ask your doctor or other care provider to review them with you.            Prescriptions were sent or printed at these locations (1 Prescription)                   [facility] [address]   [address] [place] [id number]    Telephone:  [phone]   Fax:  [phone]   Hours:                  E-Prescribed (1 of 1)         clindamycin (CLEOCIN) 300 MG capsule                Procedures and tests performed during your visit     Beta strep group A r/o culture    Rapid strep group A screen POCT      Orders Needing Specimen Collection     None      Pending Results     No orders found from 12/1/2017 to 12/4/2017.            Pending Culture Results     No orders found from 12/1/2017 to 12/4/2017.            Pending Results Instructions     If you had any lab results that were not finalized at the time of your Discharge, you can call the ED Lab Result RN at [phone]. You will be contacted by this team for any positive Lab results or changes in treatment. The  nurses are available 7 days a week from 10A to 6:30P.  You can leave a message 24 hours per day and they will return your call.        Test Results From Your Hospital Stay        12/3/2017 12:15 PM      Component Results     Component Value Ref Range & Units Status    Rapid Strep A Screen NEGATIVE neg Final    Internal QC OK Yes  Final                Thank you for choosing [facility]       Thank you for choosing [facility] for your care. Our goal is always to provide you with excellent care. Hearing back from our patients is one way we can continue to improve our services. Please take a few minutes to complete the written survey that you may receive in the mail after you visit with us. Thank you!        [facility] Information     [employer] gives you secure access to your electronic health record. If you see a primary care provider, you can also send messages to your care team and make appointments. If you have questions, please call your primary care clinic.  If you do not have a primary care provider, please call [phone] and they will assist you.        Care EveryWhere ID     This is your Care EveryWhere ID. This could be used by other organizations to access your [facility] medical records  [id number]        Equal Access to Services     CARYL JON : Hadmary Camargo, santo cool, shira dunham, krista tabor. So [facility] [phone].    ATENCIÓN: Si habla español, tiene a everett disposición servicios gratuitos de asistencia lingüística. Llame al [phone].    We comply with applicable federal civil rights laws and [place] laws. We do not discriminate on the basis of race, color, national origin, age, disability, sex, sexual orientation, or gender identity.            After Visit Summary       This is your record. Keep this with you and show to your community pharmacist(s) and doctor(s) at your next visit.

## 2017-12-03 NOTE — TELEPHONE ENCOUNTER
"  Additional Information    Negative: Drug overdose and nurse unable to answer question    Negative: Caller requesting information not related to medicine    Negative: Caller requesting a prescription for Strep throat and has a positive culture result    Negative: Rash while taking a medication or within 3 days of stopping it    Negative: Immunization reaction suspected    Negative: [1] Asthma and [2] having symptoms of asthma (cough, wheezing, etc)    Negative: MORE THAN A DOUBLE DOSE of a prescription or over-the-counter (OTC) drug    Negative: [1] DOUBLE DOSE (an extra dose or lesser amount) of over-the-counter (OTC) drug AND [2] any symptoms (e.g., dizziness, nausea, pain, sleepiness)    Negative: [1] DOUBLE DOSE (an extra dose or lesser amount) of prescription drug AND [2] any symptoms (e.g., dizziness, nausea, pain, sleepiness)    Negative: Took another person's prescription drug    Negative: [1] DOUBLE DOSE (an extra dose or lesser amount) of prescription drug AND [2] NO symptoms (Exception: a double dose of antibiotics)    Negative: Diabetes drug error or overdose (e.g., insulin or extra dose)    Negative: [1] Request for URGENT new prescription or refill of \"essential\" medication (i.e., likelihood of harm to patient if not taken) AND [2] triager unable to fill per unit policy    Negative: [1] Prescription not at pharmacy AND [2] was prescribed today by PCP    Negative: Pharmacy calling with prescription questions and triager unable to answer question    Negative: Caller has URGENT medication question about med that PCP prescribed and triager unable to answer question    Negative: Caller has NON-URGENT medication question about med that PCP prescribed and triager unable to answer question    Negative: Caller requesting a NON-URGENT new prescription or refill and triager unable to refill per unit policy    Negative: Caller has medication question about med not prescribed by PCP and triager unable to answer " "question (e.g., compatibility with other med, storage)    Negative: [1] DOUBLE DOSE (an extra dose or lesser amount) of over-the-counter (OTC) drug AND [2] NO symptoms (all triage questions negative)    Negative: [1] DOUBLE DOSE (an extra dose or lesser amount) of antibiotic drug AND [2] NO symptoms (all triage questions negative)    Negative: Caller has medication question only, adult not sick, and triager answers question    Caller has medication question, adult has minor symptoms, caller declines triage, and triager answers question     \"My daughter was seen recently and dx with strep throat. I now think I have it. Can you check to see if Dr. Cazares put anything in Dina's chart regarding treatment for the family?\" No per [facility]. Advised UC.    Protocols used: MEDICATION QUESTION CALL-ADULT-    "

## 2017-12-03 NOTE — ED PROVIDER NOTES
History   No chief complaint on file.    HPI  Cecile Lim is a 36 year old female who   HPI:     Cecile Lim is a 36 year old female who presents to the ED with a 2 day history of sore throat.  She has also had Sore Throat for 2 day(s), Recent exposure to Strep, fatigue and low grade fever.  She has not had Rhinorrhea, Hoarseness, Rash, Nausea, Vomitting, Diarrhea.  She has tried acetaminophen, ibuprofen with relief of symptoms.  She has not had a rash. He has been exposed to Strep with her daughter who was diagnosed this past Friday.      Problem List:    Patient Active Problem List    Diagnosis Date Noted     Encounter for surveillance of contraceptives 03/10/2016     Priority: Medium      has had vasectomy       Lipoma of back 11/12/2015     Priority: Medium     Contraception 06/25/2015     Priority: Medium      had vasectomy       Major depressive disorder, recurrent episode, in partial remission (HCC) 12/23/2014     Priority: Medium     Essential hypertension 09/18/2012     Priority: Medium     Stress incontinence, female 02/27/2012     Priority: Medium     CARDIOVASCULAR SCREENING; LDL GOAL LESS THAN 160 10/31/2010     Priority: Medium     Generalized anxiety disorder 10/20/2010     Priority: Medium     Diagnosis updated by automated process. Provider to review and confirm.       Ureteral stone 07/09/2009     Priority: Medium        Past Medical History:    Past Medical History:   Diagnosis Date     LAURA (generalised anxiety disorder)      HTN (hypertension)      Suicide attempt 11/2014       Past Surgical History:    Past Surgical History:   Procedure Laterality Date     NO HISTORY OF SURGERY  1/2007       Family History:    Family History   Problem Relation Age of Onset     Hypertension Mother      Musculoskeletal Disorder Mother      degenerative disc disease     Thyroid Disease Mother      Depression Mother      CEREBROVASCULAR DISEASE Mother      Hypertension Father       CEREBROVASCULAR DISEASE Father      age 40     Hypertension Maternal Grandfather      Cardiovascular Maternal Grandfather      CANCER Paternal Grandfather      lung     Asthma No family hx of      C.A.D. No family hx of      DIABETES No family hx of      Breast Cancer No family hx of      Cancer - colorectal No family hx of      Prostate Cancer No family hx of        Social History:  Marital Status:   [2]  Social History   Substance Use Topics     Smoking status: Never Smoker     Smokeless tobacco: Never Used     Alcohol use 0.0 oz/week     0 Standard drinks or equivalent per week      Comment: Rare. Maybe one drink a month        Medications:      clindamycin (CLEOCIN) 300 MG capsule   venlafaxine (EFFEXOR-XR) 37.5 MG 24 hr capsule   venlafaxine (EFFEXOR-XR) 75 MG 24 hr capsule   lisinopril (PRINIVIL/ZESTRIL) 10 MG tablet     Review of Systems   Constitutional: Positive for activity change, appetite change and fatigue.   HENT: Positive for congestion and sore throat.    Respiratory: Negative.    Cardiovascular: Negative.    All other systems reviewed and are negative.      Physical Exam   BP: (!) 149/97  Pulse: 69  Temp: 98.4  F (36.9  C)  Resp: 18  SpO2: 98 %    Physical Exam   Constitutional: She appears well-developed and well-nourished. She is cooperative.   HENT:   Head: Normocephalic and atraumatic.   Right Ear: Hearing, tympanic membrane, external ear and ear canal normal.   Left Ear: Hearing, tympanic membrane, external ear and ear canal normal.   Nose: Nose normal.   Mouth/Throat: Uvula is midline. No trismus in the jaw. No uvula swelling. Posterior oropharyngeal erythema (moderate) present. No oropharyngeal exudate, posterior oropharyngeal edema or tonsillar abscesses.   Eyes: Conjunctivae are normal. Right eye exhibits no discharge. Left eye exhibits no discharge.   Neck: Normal range of motion. Neck supple. No tracheal deviation present. No thyromegaly present.   Cardiovascular: Normal rate,  regular rhythm and normal heart sounds.  Exam reveals no gallop and no friction rub.    No murmur heard.  Pulmonary/Chest: Effort normal and breath sounds normal. No respiratory distress. She has no wheezes. She has no rales. She exhibits no tenderness.   Lymphadenopathy:     She has cervical adenopathy (mild anterior chain).   Neurological: She is alert.   Skin: Skin is warm and dry. No rash noted. No pallor.   Psychiatric: She has a normal mood and affect.   Nursing note and vitals reviewed.      ED Course     ED Course     Procedures    Labs Ordered and Resulted from Time of ED Arrival Up to the Time of Departure from the ED   RAPID STREP GROUP A SCREEN POCT     Results for orders placed or performed during the hospital encounter of 12/03/17   Rapid strep group A screen POCT   Result Value Ref Range    Rapid Strep A Screen NEGATIVE neg    Internal QC OK Yes    Beta strep group A r/o culture   Result Value Ref Range    Specimen Description Throat     Special Requests Specimen collected in eSwab transport (white cap)     Culture Micro PENDING        Assessments & Plan (with Medical Decision Making)     I have reviewed the nursing notes.    I have reviewed the findings, diagnosis, plan and need for follow up with the patient.  Medical Decision Making:  Sore throat with no exam findings to suggest peritonsillar abscess.  The rapid strep test was NEGATIVE.  A back up strep culture is being done.  Due to daughter being positive on Friday and onset of symptoms close, pt requests treatment.   Symptomatic cares discussed - Gargle, use acetaminophen or other OTC analgesic.  DDx:  Strep pharyngitis, viral pharyngitis, URI, peritonsillar abscess, retropharyngeal abscess, mono, epiglottitis      Assessment:  Pharyngitis and cannot rule out strep    Plan:   We will treat symptoms of pharyngitis and antibiotics are indicated.    She was advised to gargle with warm salt water 4 times a day and try to drink as much fluid as  possible. Use acetaminophen, ibuprofen for discomfort. Return to the ER with increased pain, inability to swallow fluids, fever, rash or any concerns.     Condition on disposition: Stable    Discharge Medication List as of 12/3/2017 12:19 PM      START taking these medications    Details   clindamycin (CLEOCIN) 300 MG capsule Take 1 capsule (300 mg) by mouth 3 times daily for 10 days, Disp-30 capsule, R-0, E-Prescribe             Final diagnoses:   Viral pharyngitis       12/3/2017   [facility] EMERGENCY DEPARTMENT     Fawn Chirinos APRN CNP  12/03/17 [id number]

## 2017-12-03 NOTE — ED NOTES
Patient here for sore throat, symptoms started 2 days ago.  Patient presents ambulatory to the urgent care.  Rapid strep ordered per protocol.

## 2017-12-03 NOTE — DISCHARGE INSTRUCTIONS
Due to Dina having positive strep at home I will prescribe the clindamycin and take this three times daily for 10 days.    Self-Care for Sore Throats    Sore throats happen for many reasons, such as colds, allergies, and infections caused by viruses or bacteria. In any case, your throat becomes red and sore. Your goal for self-care is to reduce your discomfort while giving your throat a chance to heal.  Moisten and soothe your throat  Tips include the following:    Try a sip of water first thing after waking up.    Keep your throat moist by drinking 6 or more glasses of clear liquids every day.    Run a cool-air humidifier in your room overnight.    Avoid cigarette smoke.     Suck on throat lozenges, cough drops, hard candy, ice chips, or frozen fruit-juice bars. Use the sugar-free versions if your diet or medical condition requires them.  Gargle to ease irritation  Gargling every hour or 2 can ease irritation. Try gargling with 1 of these solutions:    1/4 teaspoon of salt in 1/2 cup of warm water    An over-the-counter anesthetic gargle  Use medicine for more relief  Over-the-counter medicine can reduce sore throat symptoms. Ask your pharmacist if you have questions about which medicine to use:    Ease pain with anesthetic sprays. Aspirin or an aspirin substitute also helps. Remember, never give aspirin to anyone 18 or younger, or if you are already taking blood thinners.     For sore throats caused by allergies, try antihistamines to block the allergic reaction.    Remember: unless a sore throat is caused by a bacterial infection, antibiotics won t help you.  Prevent future sore throats  Prevention tips include the following:    Stop smoking or reduce contact with secondhand smoke. Smoke irritates the tender throat lining.    Limit contact with pets and with allergy-causing substances, such as pollen and mold.    When you re around someone with a sore throat or cold, wash your hands often to keep viruses or  bacteria from spreading.    Don t strain your vocal cords.  Call your healthcare provider  Contact your healthcare provider if you have:    A temperature over 101 F (38.3 C)    White spots on the throat    Great difficulty swallowing    Trouble breathing    A skin rash    Recent exposure to someone else with strep bacteria    Severe hoarseness and swollen glands in the neck or jaw   Date Last Reviewed: 8/1/2016 2000-2017 [employer]. [address] [place] [address]. All rights reserved. This information is not intended as a substitute for professional medical care. Always follow your healthcare professional's instructions.

## 2017-12-05 LAB
BACTERIA SPEC CULT: NORMAL
Lab: NORMAL
SPECIMEN SOURCE: NORMAL

## 2018-02-23 ENCOUNTER — OFFICE VISIT (OUTPATIENT)
Dept: FAMILY MEDICINE | Facility: CLINIC | Age: 38
End: 2018-02-23
Payer: COMMERCIAL

## 2018-02-23 VITALS
TEMPERATURE: 98.4 F | SYSTOLIC BLOOD PRESSURE: 130 MMHG | WEIGHT: 163 LBS | HEIGHT: 61 IN | OXYGEN SATURATION: 98 % | HEART RATE: 69 BPM | DIASTOLIC BLOOD PRESSURE: 90 MMHG | RESPIRATION RATE: 12 BRPM | BODY MASS INDEX: 30.78 KG/M2

## 2018-02-23 DIAGNOSIS — R39.15 URINARY URGENCY: ICD-10-CM

## 2018-02-23 DIAGNOSIS — I10 ESSENTIAL HYPERTENSION: ICD-10-CM

## 2018-02-23 DIAGNOSIS — F33.1 MAJOR DEPRESSIVE DISORDER, RECURRENT EPISODE, MODERATE (H): Chronic | ICD-10-CM

## 2018-02-23 DIAGNOSIS — G47.19 EXCESSIVE DAYTIME SLEEPINESS: Primary | ICD-10-CM

## 2018-02-23 DIAGNOSIS — F41.1 GENERALIZED ANXIETY DISORDER: Chronic | ICD-10-CM

## 2018-02-23 LAB
ALBUMIN UR-MCNC: NEGATIVE MG/DL
APPEARANCE UR: CLEAR
BACTERIA #/AREA URNS HPF: ABNORMAL /HPF
BASOPHILS # BLD AUTO: 0 10E9/L (ref 0–0.2)
BASOPHILS NFR BLD AUTO: 0.4 %
BILIRUB UR QL STRIP: NEGATIVE
COLOR UR AUTO: YELLOW
DIFFERENTIAL METHOD BLD: NORMAL
EOSINOPHIL # BLD AUTO: 0.2 10E9/L (ref 0–0.7)
EOSINOPHIL NFR BLD AUTO: 2.6 %
ERYTHROCYTE [DISTWIDTH] IN BLOOD BY AUTOMATED COUNT: 11.7 % (ref 10–15)
GLUCOSE UR STRIP-MCNC: NEGATIVE MG/DL
HCT VFR BLD AUTO: 41.1 % (ref 35–47)
HETEROPH AB SER QL: NEGATIVE
HGB BLD-MCNC: 14.1 G/DL (ref 11.7–15.7)
HGB UR QL STRIP: ABNORMAL
KETONES UR STRIP-MCNC: NEGATIVE MG/DL
LEUKOCYTE ESTERASE UR QL STRIP: NEGATIVE
LYMPHOCYTES # BLD AUTO: 3.1 10E9/L (ref 0.8–5.3)
LYMPHOCYTES NFR BLD AUTO: 37.2 %
MCH RBC QN AUTO: 31.3 PG (ref 26.5–33)
MCHC RBC AUTO-ENTMCNC: 34.3 G/DL (ref 31.5–36.5)
MCV RBC AUTO: 91 FL (ref 78–100)
MONOCYTES # BLD AUTO: 0.5 10E9/L (ref 0–1.3)
MONOCYTES NFR BLD AUTO: 5.5 %
NEUTROPHILS # BLD AUTO: 4.6 10E9/L (ref 1.6–8.3)
NEUTROPHILS NFR BLD AUTO: 54.3 %
NITRATE UR QL: NEGATIVE
NON-SQ EPI CELLS #/AREA URNS LPF: ABNORMAL /LPF
PH UR STRIP: 5.5 PH (ref 5–7)
PLATELET # BLD AUTO: 319 10E9/L (ref 150–450)
RBC # BLD AUTO: 4.5 10E12/L (ref 3.8–5.2)
RBC #/AREA URNS AUTO: ABNORMAL /HPF
SOURCE: ABNORMAL
SP GR UR STRIP: 1.02 (ref 1–1.03)
UROBILINOGEN UR STRIP-ACNC: 0.2 EU/DL (ref 0.2–1)
WBC # BLD AUTO: 8.4 10E9/L (ref 4–11)
WBC #/AREA URNS AUTO: ABNORMAL /HPF

## 2018-02-23 PROCEDURE — 87186 SC STD MICRODIL/AGAR DIL: CPT | Performed by: FAMILY MEDICINE

## 2018-02-23 PROCEDURE — 87088 URINE BACTERIA CULTURE: CPT | Performed by: FAMILY MEDICINE

## 2018-02-23 PROCEDURE — 86308 HETEROPHILE ANTIBODY SCREEN: CPT | Performed by: FAMILY MEDICINE

## 2018-02-23 PROCEDURE — 36415 COLL VENOUS BLD VENIPUNCTURE: CPT | Performed by: FAMILY MEDICINE

## 2018-02-23 PROCEDURE — 87086 URINE CULTURE/COLONY COUNT: CPT | Performed by: FAMILY MEDICINE

## 2018-02-23 PROCEDURE — 80050 GENERAL HEALTH PANEL: CPT | Performed by: FAMILY MEDICINE

## 2018-02-23 PROCEDURE — 81001 URINALYSIS AUTO W/SCOPE: CPT | Performed by: FAMILY MEDICINE

## 2018-02-23 PROCEDURE — 99214 OFFICE O/P EST MOD 30 MIN: CPT | Performed by: FAMILY MEDICINE

## 2018-02-23 RX ORDER — VENLAFAXINE HYDROCHLORIDE 37.5 MG/1
CAPSULE, EXTENDED RELEASE ORAL
Qty: 90 CAPSULE | Refills: 3 | Status: SHIPPED | OUTPATIENT
Start: 2018-02-23 | End: 2019-02-22

## 2018-02-23 RX ORDER — VENLAFAXINE HYDROCHLORIDE 75 MG/1
75 CAPSULE, EXTENDED RELEASE ORAL DAILY
Qty: 90 CAPSULE | Refills: 3 | Status: SHIPPED | OUTPATIENT
Start: 2018-02-23 | End: 2019-02-22

## 2018-02-23 RX ORDER — LISINOPRIL 10 MG/1
10 TABLET ORAL DAILY
Qty: 90 TABLET | Refills: 3 | Status: SHIPPED | OUTPATIENT
Start: 2018-02-23 | End: 2019-02-22

## 2018-02-23 ASSESSMENT — PAIN SCALES - GENERAL: PAINLEVEL: NO PAIN (0)

## 2018-02-23 NOTE — MR AVS SNAPSHOT
After Visit Summary   2/23/2018    Cecile Lim    MRN: [id number]           Patient Information     Date Of Birth          1980        Visit Information        Provider Department      2/23/2018 3:20 PM Leonidas Dockery MD [facility]        Today's Diagnoses     Urinary urgency    -  1    Generalized anxiety disorder        Major depressive disorder, recurrent episode, moderate (H)        Essential hypertension        Excessive daytime sleepiness           Follow-ups after your visit        Additional Services     SLEEP EVALUATION & MANAGEMENT REFERRAL - [facility]  [phone] (Age 2 and up)       Please be aware that coverage of these services is subject to the terms and limitations of your health insurance plan.  Call member services at your health plan with any benefit or coverage questions.      Please bring the following to your appointment:    >>   List of current medications   >>   This referral request   >>   Any documents/labs given to you for this referral                      Future tests that were ordered for you today     Open Future Orders        Priority Expected Expires Ordered    SLEEP EVALUATION & MANAGEMENT REFERRAL - [facility]  [phone] (Age 2 and up) Routine  2/23/2019 2/23/2018            Who to contact     If you have questions or need follow up information about today's clinic visit or your schedule please contact [facility] directly at [phone].  Normal or non-critical lab and imaging results will be communicated to you by MyChart, letter or phone within 4 business days after the clinic has received the results. If you do not hear from us within 7 days, please contact the clinic through MyChart or phone. If you have a critical or abnormal lab result, we will notify you by phone as soon as possible.  Submit refill requests through [employer] or call your  "pharmacy and they will forward the refill request to us. Please allow 3 business days for your refill to be completed.          Additional Information About Your Visit        MyChart Information     [employer] gives you secure access to your electronic health record. If you see a primary care provider, you can also send messages to your care team and make appointments. If you have questions, please call your primary care clinic.  If you do not have a primary care provider, please call [phone] and they will assist you.        Care EveryWhere ID     This is your Care EveryWhere ID. This could be used by other organizations to access your [place] medical records  [id number]        Your Vitals Were     Pulse Temperature Respirations Height Pulse Oximetry Breastfeeding?    69 98.4  F (36.9  C) (Tympanic) 12 5' 1\" (1.549 m) 98% No    BMI (Body Mass Index)                   30.8 kg/m2            Blood Pressure from Last 3 Encounters:   02/23/18 130/90   12/03/17 (!) 149/97   01/05/17 126/75    Weight from Last 3 Encounters:   02/23/18 163 lb (73.9 kg)   11/04/17 160 lb (72.6 kg)   01/05/17 157 lb (71.2 kg)              We Performed the Following     *UA reflex to Microscopic and Culture ([place] and [facility] (except [place] and [place])     DEPRESSION ACTION PLAN (DAP)     Urine Culture Aerobic Bacterial     Urine Microscopic          Where to get your medicines      These medications [facility] [place] - [address]  [address], [facility] [address]    Hours:  GEOVANY Mendoza [facility] Phone:  [phone]     lisinopril 10 MG tablet    venlafaxine 37.5 MG 24 hr capsule    venlafaxine 75 MG 24 hr capsule          Primary Care Provider Office Phone # Fax #    Fawn Beatriz Talley [phone] [address] [facility] [address]        Equal Access to Services     CARYL JON AH: Shala Camargo, wasuzette lumagdyadaslime, qaybta " krista samuelrory oliveros ah. So [facility] [phone].    ATENCIÓN: Si oneil mitchell, tiene a everett disposición servicios gratuitos de asistencia lingüística. Laurent al [phone].    We comply with applicable federal civil rights laws and [place] laws. We do not discriminate on the basis of race, color, national origin, age, disability, sex, sexual orientation, or gender identity.            Thank you!     Thank you for choosing [facility]  for your care. Our goal is always to provide you with excellent care. Hearing back from our patients is one way we can continue to improve our services. Please take a few minutes to complete the written survey that you may receive in the mail after your visit with us. Thank you!             Your Updated Medication List - Protect others around you: Learn how to safely use, store and throw away your medicines at www.disposemymeds.org.          This list is accurate as of 2/23/18  4:19 PM.  Always use your most recent med list.                   Brand Name Dispense Instructions for use Diagnosis    lisinopril 10 MG tablet    PRINIVIL/ZESTRIL    90 tablet    Take 1 tablet (10 mg) by mouth daily    Essential hypertension       * venlafaxine 37.5 MG 24 hr capsule    EFFEXOR-XR    90 capsule    Take with 75 mg for total of 112.5 mg once daily    Generalized anxiety disorder, Major depressive disorder, recurrent episode, moderate (H)       * venlafaxine 75 MG 24 hr capsule    EFFEXOR-XR    90 capsule    Take 1 capsule (75 mg) by mouth daily    Major depressive disorder, recurrent episode, moderate (H), Generalized anxiety disorder       * Notice:  This list has 2 medication(s) that are the same as other medications prescribed for you. Read the directions carefully, and ask your doctor or other care provider to review them with you.

## 2018-02-23 NOTE — PROGRESS NOTES
SUBJECTIVE:   Cecile Lim is a 37 year old female who presents to clinic today for the following health issues:    Pt has been having fatigue for 1 month and does not know why.    Her symptom is more of a sleepiness.  For the last months she has been sleeping from 7 PM until 7 AM.  On the weekends she will go back and sleep for another 2 hours in the afternoon wake up for an hour and then go back to sleep again in the late afternoon.  She has never had this before.  She has started drinking a lot of caffeine while at work.  She has some mild urgency of urination but the urinalysis is unremarkable.  She has no fever chills or sweats.  She has no upper respiratory tract symptoms.  Her bowel movements are normal.  She has a history of depression but she does not feel particularly depressed at this time.  Nothing bad has happened in her life.  Her relationship with her  is going well.  Her job is going well.    We will get labs and a sleep consultation rule out narcolepsy.  She does snore very loudly, will rule out sleep apnea.      URINARY TRACT SYMPTOMS  Onset: 1 week URGENCY    Description:   Painful urination (Dysuria): no   Blood in urine (Hematuria): no   Delay in urine (Hesitency): no     Intensity: moderate    Progression of Symptoms:  same    Accompanying Signs & Symptoms:  Fever/chills: no   Flank pain no   Nausea and vomiting: YES  Any vaginal symptoms: none  Abdominal/Pelvic Pain: no     History:   History of frequent UTI's: no   History of kidney stones: no   Sexually Active: YES  Possibility of pregnancy: No    Precipitating factors:   none    Therapies Tried and outcome: Increase fluid intake          Problem list and histories reviewed & adjusted, as indicated.  Additional history: as documented    Patient Active Problem List   Diagnosis     Ureteral stone     Generalized anxiety disorder     CARDIOVASCULAR SCREENING; LDL GOAL LESS THAN 160     Stress incontinence, female     Essential  "hypertension     Major depressive disorder, recurrent episode, in partial remission (HCC)     Contraception     Lipoma of back     Encounter for surveillance of contraceptives     Past Surgical History:   Procedure Laterality Date     NO HISTORY OF SURGERY  1/2007       Social History   Substance Use Topics     Smoking status: Never Smoker     Smokeless tobacco: Never Used     Alcohol use 0.0 oz/week     0 Standard drinks or equivalent per week      Comment: Rare. Maybe one drink a month     Family History   Problem Relation Age of Onset     Hypertension Mother      Musculoskeletal Disorder Mother      degenerative disc disease     Thyroid Disease Mother      Depression Mother      CEREBROVASCULAR DISEASE Mother      Hypertension Father      CEREBROVASCULAR DISEASE Father      age 40     Hypertension Maternal Grandfather      Cardiovascular Maternal Grandfather      CANCER Paternal Grandfather      lung     Asthma No family hx of      C.A.D. No family hx of      DIABETES No family hx of      Breast Cancer No family hx of      Cancer - colorectal No family hx of      Prostate Cancer No family hx of            Reviewed and updated as needed this visit by clinical staff  Tobacco  Allergies  Med Hx  Surg Hx  Fam Hx  Soc Hx      Reviewed and updated as needed this visit by Provider         ROS:  Constitutional, HEENT, cardiovascular, pulmonary, gi and gu systems are negative, except as otherwise noted.    OBJECTIVE:     /90 (BP Location: Right arm, Patient Position: Chair, Cuff Size: Adult Large)  Pulse 69  Temp 98.4  F (36.9  C) (Tympanic)  Resp 12  Ht 5' 1\" (1.549 m)  Wt 163 lb (73.9 kg)  SpO2 98%  Breastfeeding? No  BMI 30.8 kg/m2  Body mass index is 30.8 kg/(m^2).  GENERAL: healthy, alert and no distress  NECK: no adenopathy, no asymmetry, masses, or scars and thyroid normal to palpation  RESP: lungs clear to auscultation - no rales, rhonchi or wheezes  CV: regular rate and rhythm, normal S1 S2, no " S3 or S4, no murmur, click or rub, no peripheral edema and peripheral pulses strong  ABDOMEN: soft, nontender, no hepatosplenomegaly, no masses and bowel sounds normal        ASSESSMENT/PLAN:       We will get labs and a sleep consultation rule out narcolepsy.  She does snore very loudly, will rule out sleep apnea.          ICD-10-CM    1. Excessive daytime sleepiness G47.19 SLEEP EVALUATION & MANAGEMENT REFERRAL - [facility]  [phone] (Age 2 and up)     Comprehensive metabolic panel     CBC with platelets differential     TSH with free T4 reflex     Mononucleosis screen   2. Generalized anxiety disorder F41.1 venlafaxine (EFFEXOR-XR) 37.5 MG 24 hr capsule     venlafaxine (EFFEXOR-XR) 75 MG 24 hr capsule   3. Major depressive disorder, recurrent episode, moderate (H) F33.1 venlafaxine (EFFEXOR-XR) 37.5 MG 24 hr capsule     venlafaxine (EFFEXOR-XR) 75 MG 24 hr capsule     DEPRESSION ACTION PLAN (DAP)   4. Essential hypertension I10 lisinopril (PRINIVIL/ZESTRIL) 10 MG tablet   5. Urinary urgency R39.15 *UA reflex to Microscopic and Culture ([place] and [facility] (except [place] and [place])     Urine Microscopic     Urine Culture Aerobic Bacterial       Total face to face time: 25 minutes.  Time spent counseling on 15 minutes as outline above.      Leonidas Dockery MD  [facility]

## 2018-02-24 LAB
ALBUMIN SERPL-MCNC: 3.9 G/DL (ref 3.4–5)
ALP SERPL-CCNC: 76 U/L (ref 40–150)
ALT SERPL W P-5'-P-CCNC: 22 U/L (ref 0–50)
ANION GAP SERPL CALCULATED.3IONS-SCNC: 8 MMOL/L (ref 3–14)
AST SERPL W P-5'-P-CCNC: 11 U/L (ref 0–45)
BILIRUB SERPL-MCNC: 0.2 MG/DL (ref 0.2–1.3)
BUN SERPL-MCNC: 14 MG/DL (ref 7–30)
CALCIUM SERPL-MCNC: 8.5 MG/DL (ref 8.5–10.1)
CHLORIDE SERPL-SCNC: 103 MMOL/L (ref 94–109)
CO2 SERPL-SCNC: 23 MMOL/L (ref 20–32)
CREAT SERPL-MCNC: 0.6 MG/DL (ref 0.52–1.04)
GFR SERPL CREATININE-BSD FRML MDRD: >90 ML/MIN/1.7M2
GLUCOSE SERPL-MCNC: 74 MG/DL (ref 70–99)
POTASSIUM SERPL-SCNC: 4.1 MMOL/L (ref 3.4–5.3)
PROT SERPL-MCNC: 7.8 G/DL (ref 6.8–8.8)
SODIUM SERPL-SCNC: 134 MMOL/L (ref 133–144)
TSH SERPL DL<=0.005 MIU/L-ACNC: 2.35 MU/L (ref 0.4–4)

## 2018-02-24 ASSESSMENT — PATIENT HEALTH QUESTIONNAIRE - PHQ9: SUM OF ALL RESPONSES TO PHQ QUESTIONS 1-9: 8

## 2018-02-27 LAB
BACTERIA SPEC CULT: ABNORMAL
BACTERIA SPEC CULT: ABNORMAL
Lab: ABNORMAL
SPECIMEN SOURCE: ABNORMAL

## 2018-04-03 ENCOUNTER — OFFICE VISIT (OUTPATIENT)
Dept: UROLOGY | Facility: CLINIC | Age: 38
End: 2018-04-03
Payer: COMMERCIAL

## 2018-04-03 VITALS
DIASTOLIC BLOOD PRESSURE: 87 MMHG | HEART RATE: 73 BPM | HEIGHT: 61 IN | WEIGHT: 163 LBS | RESPIRATION RATE: 16 BRPM | SYSTOLIC BLOOD PRESSURE: 124 MMHG | BODY MASS INDEX: 30.78 KG/M2 | TEMPERATURE: 98.3 F

## 2018-04-03 DIAGNOSIS — N39.3 SUI (STRESS URINARY INCONTINENCE, FEMALE): Primary | ICD-10-CM

## 2018-04-03 LAB
ALBUMIN UR-MCNC: NEGATIVE MG/DL
APPEARANCE UR: CLEAR
BILIRUB UR QL STRIP: NEGATIVE
COLOR UR AUTO: YELLOW
GLUCOSE UR STRIP-MCNC: NEGATIVE MG/DL
HGB UR QL STRIP: ABNORMAL
KETONES UR STRIP-MCNC: NEGATIVE MG/DL
LEUKOCYTE ESTERASE UR QL STRIP: NEGATIVE
NITRATE UR QL: NEGATIVE
PH UR STRIP: 5.5 PH (ref 5–7)
RBC #/AREA URNS AUTO: NORMAL /HPF
SOURCE: ABNORMAL
SP GR UR STRIP: >1.03 (ref 1–1.03)
UROBILINOGEN UR STRIP-ACNC: 0.2 EU/DL (ref 0.2–1)
WBC #/AREA URNS AUTO: NORMAL /HPF

## 2018-04-03 PROCEDURE — 52000 CYSTOURETHROSCOPY: CPT | Performed by: UROLOGY

## 2018-04-03 PROCEDURE — 81001 URINALYSIS AUTO W/SCOPE: CPT | Performed by: UROLOGY

## 2018-04-03 PROCEDURE — 99244 OFF/OP CNSLTJ NEW/EST MOD 40: CPT | Mod: 25 | Performed by: UROLOGY

## 2018-04-03 NOTE — NURSING NOTE
"Chief Complaint   Patient presents with     Consult     Incontinence and discuss sling       Initial /87 (BP Location: Left arm, Patient Position: Chair, Cuff Size: Adult Regular)  Pulse 73  Temp 98.3  F (36.8  C) (Oral)  Resp 16  Ht 1.549 m (5' 1\")  Wt 73.9 kg (163 lb)  BMI 30.8 kg/m2 Estimated body mass index is 30.8 kg/(m^2) as calculated from the following:    Height as of this encounter: 1.549 m (5' 1\").    Weight as of this encounter: 73.9 kg (163 lb).  Medication Reconciliation: complete    Gwendolyn SEARS CMA    "

## 2018-04-03 NOTE — PROGRESS NOTES
Cecile Lim is a 37 year old female seen in consultation for incont. Consult from Fawn Talley.        Pt reports 10+ yr hx progressive MARYELLEN, now very bothersome, limits physical activity such as exercise >> weight gain and anxiety. Limited urge and insensate loss. Wears several pads per day, none at nite.    Denies dysuria, gross hematuria, frequency; voids q 4 hrs during the day, no nocturia. Dry at nite, no pad.    Seen by  several yrs ago; eval including UDS suggested ISD >> timed voiding  + Ditropan >> no improvement but did get dry mouth. Also PT without significant improvement.    Denies dysuria, gross hematuria, UTI's, prior bladder surgery.    Hx 2 vag deliveries,  vas.     Denies constipation, fecal incont. Moderate fluids and caffeine. Works as a nurse in a coag clinic. (Did not complete voiding diary).      Past Medical History:   Diagnosis Date     LAURA (generalised anxiety disorder)      HTN (hypertension)      Suicide attempt 11/2014       Past Surgical History:   Procedure Laterality Date     NO HISTORY OF SURGERY  1/2007       Social History     Social History     Marital status:      Spouse name: Kenn     Number of children: 0     Years of education: N/A     Occupational History     Nurse [employer]     Social History Main Topics     Smoking status: Never Smoker     Smokeless tobacco: Never Used     Alcohol use 0.0 oz/week     0 Standard drinks or equivalent per week      Comment: Rare. Maybe one drink a month     Drug use: No     Sexual activity: Yes     Partners: Male     Birth control/ protection: Condom     Other Topics Concern      Service No     Blood Transfusions No     Caffeine Concern No     Occupational Exposure No     Hobby Hazards No     Sleep Concern No     Stress Concern No     Weight Concern No     Special Diet No     Back Care No     Exercise Yes     runner,walk     Bike Helmet No     recommended     Seat Belt Yes     Self-Exams No      Parent/Sibling W/ Cabg, Mi Or Angioplasty Before 65f 55m? Yes     maternal grandfather     Social History Narrative    .    Works as RN - nurse at [employer].         Current Outpatient Prescriptions   Medication Sig Dispense Refill     venlafaxine (EFFEXOR-XR) 37.5 MG 24 hr capsule Take with 75 mg for total of 112.5 mg once daily 90 capsule 3     venlafaxine (EFFEXOR-XR) 75 MG 24 hr capsule Take 1 capsule (75 mg) by mouth daily 90 capsule 3     lisinopril (PRINIVIL/ZESTRIL) 10 MG tablet Take 1 tablet (10 mg) by mouth daily 90 tablet 3       Physical Exam:    GENL: NAD.    ABD: Soft, non-tender, no masses.    EG: Well-estrogenized, no masses.    VAGINA: Well-estrogenized, no masses.    BN HYPERMOBILITY: Moderate to marked.    CYSTOCELE: Grade 1.    APICAL PROLAPSE: None.    RECTOCELE: Minimal.    BIMANUAL: No mass or tenderness.    Cysto:    (Informed consent obtained. Pause for cause performed)   Sterile prep.    17 Fr scope inserted through urethra. Systematic examination w 70 degree lens.   PVR: 5 cc   MUCOSA: Normal without lesion   ORIFICES: Normal location and morphology   CAPACITY: 500 cc; no pain with filling   Scope withdrawn without untoward effect.    Valsalva:   Moderate hypermobility, moderate leakage noted.    (Pt tolerated procedure without difficulty).      Results for orders placed or performed in visit on 04/03/18   UA reflex to Microscopic and Culture [[id number]]   Result Value Ref Range    Color Urine Yellow     Appearance Urine Clear     Glucose Urine Negative NEG^Negative mg/dL    Bilirubin Urine Negative NEG^Negative    Ketones Urine Negative NEG^Negative mg/dL    Specific Gravity Urine >1.030 1.003 - 1.035    Blood Urine Small (A) NEG^Negative    pH Urine 5.5 5.0 - 7.0 pH    Protein Albumin Urine Negative NEG^Negative mg/dL    Urobilinogen Urine 0.2 0.2 - 1.0 EU/dL    Nitrite Urine Negative NEG^Negative    Leukocyte Esterase Urine Negative NEG^Negative    Source Midstream Urine     Urine Microscopic   Result Value Ref Range    WBC Urine 0 - 5 OTO5^0 - 5 /HPF    RBC Urine O - 2 OTO2^O - 2 /HPF         IMP:  1. MARYELLEN with hypermobility, bothersome; failed conservative rx      PLAN:  1. Discussed situation with patient in detail.    2. ALTIS SLING DISCUSSION: We discussed the patients situation in detail including her specific anatomy and conditions. Diagrams are drawn.    We discussed the surgical treatment including Altis pubovaginal sling utilizing mesh material. We addressed the technical aspects of the procedure as well as the potential risks and complications incuding, but not limited to bleeding, infection, damage to organs or other injury. We discussed the recovery process and the potential effect on sexual function in detail. She also understands the alternative forms of therapy (including no therapy and treatment without mesh), and the potential need for additional therapy. We discussed the FDA report on the use of surgical mesh. All questions are answered in detail. Informed consent is obtained. Consent form signed. Handout given.    Pt suggests she would like to proceed in the near future.    3. 60 minutes spent with patient, more than 50% in counseling and coordination of care for incont, which did not include time spent for the procedure.    4. Copy K Alicia

## 2018-04-03 NOTE — LETTER
SURGERYPLANNING/SCHEDULING WORKSHEET                              [facility] [place]  [address] [address] [phone]                          Cecile Lim                :  1980  MRN:  [id number]  Phone: [phone] (home)     Same Day Surgery   Surgeon: Douglas Hopkins MD  Diagnosis:   Stress urinary incontinence  Allergies:  Review of patient's allergies indicates no known allergies.   A preoperative evaluation by the primary care provider has been requested to summarize and modify, where possible, medical risks to the proposed surgery.  Specific risks requiring evaluation include   Patient Active Problem List    Diagnosis     Encounter for surveillance of contraceptives     Lipoma of back     Contraception     Major depressive disorder, recurrent episode, in partial remission (HCC)     Essential hypertension     Stress incontinence, female     CARDIOVASCULAR SCREENING; LDL GOAL LESS THAN 160     Generalized anxiety disorder     Ureteral stone     ====================================================  Surgical Procedure:  Pubovaginal sling (Altis)  Length of Procedure:  10 minutes  Type of anesthesia:  Local with MAC  The proposed surgical procedure is considered LOW risk.  Date of Procedure:________________    Time: _____________________       Special Equipment: None  Informed Consent Obtained and Signed:  YES  ====================================================  Instructions to Same Day Surgery Staff  Pneumoboots  Preop Antibiotic:  Ancef 2 gm IV  pre-op within one hour prior to incision For > 80 kg  Preop Pain Meds:  None  Preop Orders:  Routine Standing Orders.  ====================================================  Instructions to the patient:  Preop physical exam scheduled (within 30 days or 7 days prior) with:   ____________________  Clinic:  ____________________                                    "      Date______________Time_________________________  Come to the hospital at: ________________________________  HOME PREPARATION:   {FL SURG HOME PREP:[id number]::\"Shower with Hibiclens the night before or the morning of surgery, gently cleaning skin from neck to feet\",\"Bathe and brush teeth the morning of surgery.\",\"Take medications with a sip of water the morning of surgery: \",\"Check with  if taking insulin.\",\"May have  a light meal, toast and clear liquids, up to 8 hrs before surgery\",\"May have clear liquids (liquids one can read through) up to 4 hrs before surgery\",\"NOTHING after 4 hrs before surgery\",\"Stop aspirin 7-10 days before surgery\",\"Stop NSAIDS (Ibuproven, Naproxen, etc) 5 days before surgery\",\"Stop Plavix 7-10 days before surgery\"}      Douglas Hopkins MD    4/3/2018  This form was electronically signed at chart closure                                                                        Chart Copy  "

## 2018-04-03 NOTE — MR AVS SNAPSHOT
"              After Visit Summary   4/3/2018    Cecile Lim    MRN: [id number]           Patient Information     Date Of Birth          1980        Visit Information        Provider Department      4/3/2018 8:00 AM Douglas Hopkins MD [facility]        Today's Diagnoses     MARYELLEN (stress urinary incontinence, female)    -  1       Follow-ups after your visit        Who to contact     If you have questions or need follow up information about today's clinic visit or your schedule please contact [facility] directly at [phone].  Normal or non-critical lab and imaging results will be communicated to you by [employer], letter or phone within 4 business days after the clinic has received the results. If you do not hear from us within 7 days, please contact the clinic through [employer] or phone. If you have a critical or abnormal lab result, we will notify you by phone as soon as possible.  Submit refill requests through [employer] or call your pharmacy and they will forward the refill request to us. Please allow 3 business days for your refill to be completed.          Additional Information About Your Visit        MyChart Information     [employer] gives you secure access to your electronic health record. If you see a primary care provider, you can also send messages to your care team and make appointments. If you have questions, please call your primary care clinic.  If you do not have a primary care provider, please call [phone] and they will assist you.        Care EveryWhere ID     This is your Care EveryWhere ID. This could be used by other organizations to access your [place] medical records  [id number]        Your Vitals Were     Pulse Temperature Respirations Height BMI (Body Mass Index)       73 98.3  F (36.8  C) (Oral) 16 1.549 m (5' 1\") 30.8 kg/m2        Blood Pressure from Last 3 Encounters:   04/03/18 124/87   02/23/18 130/90   12/03/17 (!) 149/97    Weight from " Last 3 Encounters:   04/03/18 73.9 kg (163 lb)   02/23/18 73.9 kg (163 lb)   11/04/17 72.6 kg (160 lb)              We Performed the Following     CYSTOURETHROSCOPY (79002)     UA reflex to Microscopic and Culture [[id number]]     Urine Microscopic        Primary Care Provider Office Phone # Fax #    Fawn Talley, [phone] [address] [facility] [address]        Equal Access to Services     CARYL JON : Hadii aad ku hadasho Soomaali, waaxda luqadaha, qaybta kaalmada adeegyada, waxay idiin hayseamusn mini tabor. So [facility] [phone].    ATENCIÓN: Si habla español, tiene a everett disposición servicios gratuitos de asistencia lingüística. Llame al [phone].    We comply with applicable federal civil rights laws and [place] laws. We do not discriminate on the basis of race, color, national origin, age, disability, sex, sexual orientation, or gender identity.            Thank you!     Thank you for choosing [facility]  for your care. Our goal is always to provide you with excellent care. Hearing back from our patients is one way we can continue to improve our services. Please take a few minutes to complete the written survey that you may receive in the mail after your visit with us. Thank you!             Your Updated Medication List - Protect others around you: Learn how to safely use, store and throw away your medicines at www.disposemymeds.org.          This list is accurate as of 4/3/18  9:20 AM.  Always use your most recent med list.                   Brand Name Dispense Instructions for use Diagnosis    lisinopril 10 MG tablet    PRINIVIL/ZESTRIL    90 tablet    Take 1 tablet (10 mg) by mouth daily    Essential hypertension       * venlafaxine 37.5 MG 24 hr capsule    EFFEXOR-XR    90 capsule    Take with 75 mg for total of 112.5 mg once daily    Generalized anxiety disorder, Major depressive disorder, recurrent episode, moderate (H)       * venlafaxine 75  MG 24 hr capsule    EFFEXOR-XR    90 capsule    Take 1 capsule (75 mg) by mouth daily    Major depressive disorder, recurrent episode, moderate (H), Generalized anxiety disorder       * Notice:  This list has 2 medication(s) that are the same as other medications prescribed for you. Read the directions carefully, and ask your doctor or other care provider to review them with you.

## 2018-04-06 ENCOUNTER — TELEPHONE (OUTPATIENT)
Dept: UROLOGY | Facility: CLINIC | Age: 38
End: 2018-04-06

## 2018-04-06 NOTE — TELEPHONE ENCOUNTER
Reason for Call:  Patient is calling in to schedule sling procedure    Detailed comments:    Phone Number Patient can be reached at: Home number on file [phone] (home)    Best Time: NA    Can we leave a detailed message on this number? Not Applicable    Call taken on 4/6/2018 at 2:41 PM by Blanca Anne

## 2018-04-09 DIAGNOSIS — N39.3 SUI (STRESS URINARY INCONTINENCE, FEMALE): Primary | ICD-10-CM

## 2018-04-09 NOTE — TELEPHONE ENCOUNTER
Type of surgery: Pubovaginal sling (Altis)  Location of surgery: [facility]  Date and time of surgery: 4/27/18 @ 0730  Surgeon: Dr. Douglas Hopkins  Pre-Op Appt Date: patient will call to schedule  Post-Op Appt Date: patient will call to schedule   Packet sent out: Yes  Pre-cert/Authorization completed:  Not Applicable  Date: 4/9/18    Aimee Allen MA

## 2018-04-09 NOTE — TELEPHONE ENCOUNTER
Patient called back, informed that I could schedule her surgery for 4/27/18 at 0730. Patient accepted and pre-surgery information reviewed with patient as well as copy mailed to patient.   Aimee Allne MA

## 2018-04-11 ENCOUNTER — OFFICE VISIT (OUTPATIENT)
Dept: FAMILY MEDICINE | Facility: CLINIC | Age: 38
End: 2018-04-11
Payer: COMMERCIAL

## 2018-04-11 VITALS
WEIGHT: 162 LBS | DIASTOLIC BLOOD PRESSURE: 82 MMHG | SYSTOLIC BLOOD PRESSURE: 126 MMHG | BODY MASS INDEX: 30.58 KG/M2 | HEART RATE: 66 BPM | OXYGEN SATURATION: 98 % | HEIGHT: 61 IN | TEMPERATURE: 98.6 F

## 2018-04-11 DIAGNOSIS — F41.1 GENERALIZED ANXIETY DISORDER: Chronic | ICD-10-CM

## 2018-04-11 DIAGNOSIS — G47.19 EXCESSIVE DAYTIME SLEEPINESS: ICD-10-CM

## 2018-04-11 DIAGNOSIS — F33.41 MAJOR DEPRESSIVE DISORDER, RECURRENT EPISODE, IN PARTIAL REMISSION (H): ICD-10-CM

## 2018-04-11 DIAGNOSIS — Z01.818 PREOP GENERAL PHYSICAL EXAM: Primary | ICD-10-CM

## 2018-04-11 DIAGNOSIS — I10 ESSENTIAL HYPERTENSION: Chronic | ICD-10-CM

## 2018-04-11 DIAGNOSIS — N39.3 STRESS INCONTINENCE, FEMALE: ICD-10-CM

## 2018-04-11 LAB
ANION GAP SERPL CALCULATED.3IONS-SCNC: 6 MMOL/L (ref 3–14)
BUN SERPL-MCNC: 15 MG/DL (ref 7–30)
CALCIUM SERPL-MCNC: 9.3 MG/DL (ref 8.5–10.1)
CHLORIDE SERPL-SCNC: 104 MMOL/L (ref 94–109)
CO2 SERPL-SCNC: 26 MMOL/L (ref 20–32)
CREAT SERPL-MCNC: 0.57 MG/DL (ref 0.52–1.04)
GFR SERPL CREATININE-BSD FRML MDRD: >90 ML/MIN/1.7M2
GLUCOSE SERPL-MCNC: 81 MG/DL (ref 70–99)
POTASSIUM SERPL-SCNC: 4 MMOL/L (ref 3.4–5.3)
SODIUM SERPL-SCNC: 136 MMOL/L (ref 133–144)

## 2018-04-11 PROCEDURE — 36415 COLL VENOUS BLD VENIPUNCTURE: CPT | Performed by: INTERNAL MEDICINE

## 2018-04-11 PROCEDURE — 80048 BASIC METABOLIC PNL TOTAL CA: CPT | Performed by: INTERNAL MEDICINE

## 2018-04-11 PROCEDURE — 82607 VITAMIN B-12: CPT | Performed by: INTERNAL MEDICINE

## 2018-04-11 PROCEDURE — 99214 OFFICE O/P EST MOD 30 MIN: CPT | Performed by: INTERNAL MEDICINE

## 2018-04-11 PROCEDURE — 82306 VITAMIN D 25 HYDROXY: CPT | Performed by: INTERNAL MEDICINE

## 2018-04-11 NOTE — PATIENT INSTRUCTIONS
Before Your Surgery      Call your surgeon if there is any change in your health. This includes signs of a cold or flu (such as a sore throat, runny nose, cough, rash or fever).    Do not smoke, drink alcohol or take over the counter medicine (unless your surgeon or primary care doctor tells you to) for the 24 hours before and after surgery.    If you take prescribed drugs: Follow your doctor s orders about which medicines to take and which to stop until after surgery.    Eating and drinking prior to surgery: follow the instructions from your surgeon    Take a shower or bath the night before surgery. Use the soap your surgeon gave you to gently clean your skin. If you do not have soap from your surgeon, use your regular soap. Do not shave or scrub the surgery site.  Wear clean pajamas and have clean sheets on your bed.       1. Do not take lisinopril the day of surgery  2. Blood work today

## 2018-04-11 NOTE — NURSING NOTE
"Chief Complaint   Patient presents with     Pre-Op Exam       Initial BP (!) 131/91 (BP Location: Right arm, Patient Position: Chair, Cuff Size: Adult Regular)  Pulse 66  Temp 98.6  F (37  C) (Tympanic)  Ht 5' 0.63\" (1.54 m)  Wt 162 lb (73.5 kg)  SpO2 98%  Breastfeeding? No  BMI 30.98 kg/m2 Estimated body mass index is 30.98 kg/(m^2) as calculated from the following:    Height as of this encounter: 5' 0.63\" (1.54 m).    Weight as of this encounter: 162 lb (73.5 kg).  Medication Reconciliation: complete   Britney Nice CMA (BREEZY)   (aka: Carola Nice)      "

## 2018-04-11 NOTE — MR AVS SNAPSHOT
After Visit Summary   4/11/2018    Cecile Lim    MRN: [id number]           Patient Information     Date Of Birth          1980        Visit Information        Provider Department      4/11/2018 3:40 PM Fawn Talley,  [facility]        Today's Diagnoses     Preop general physical exam    -  1    Stress incontinence, female        Major depressive disorder, recurrent episode, in partial remission (HCC)        Generalized anxiety disorder        Essential hypertension        Excessive daytime sleepiness          Care Instructions      Before Your Surgery      Call your surgeon if there is any change in your health. This includes signs of a cold or flu (such as a sore throat, runny nose, cough, rash or fever).    Do not smoke, drink alcohol or take over the counter medicine (unless your surgeon or primary care doctor tells you to) for the 24 hours before and after surgery.    If you take prescribed drugs: Follow your doctor s orders about which medicines to take and which to stop until after surgery.    Eating and drinking prior to surgery: follow the instructions from your surgeon    Take a shower or bath the night before surgery. Use the soap your surgeon gave you to gently clean your skin. If you do not have soap from your surgeon, use your regular soap. Do not shave or scrub the surgery site.  Wear clean pajamas and have clean sheets on your bed.       1. Do not take lisinopril the day of surgery  2. Blood work today          Follow-ups after your visit        Your next 10 appointments already scheduled     Apr 27, 2018   Procedure with Douglas Hopkins MD   [facility] (--)    [address] [place] [id number] [phone]           The medical center is located at [address] (between [address] in [place], [place] miles north of [place]).              Who to contact     If you have questions or need follow up  "information about today's clinic visit or your schedule please contact [facility] directly at [phone].  Normal or non-critical lab and imaging results will be communicated to you by MyChart, letter or phone within 4 business days after the clinic has received the results. If you do not hear from us within 7 days, please contact the clinic through "[employer] or phone. If you have a critical or abnormal lab result, we will notify you by phone as soon as possible.  Submit refill requests through [employer] or call your pharmacy and they will forward the refill request to us. Please allow 3 business days for your refill to be completed.          Additional Information About Your Visit        "[employer] Information     [employer] gives you secure access to your electronic health record. If you see a primary care provider, you can also send messages to your care team and make appointments. If you have questions, please call your primary care clinic.  If you do not have a primary care provider, please call [phone] and they will assist you.        Care EveryWhere ID     This is your Care EveryWhere ID. This could be used by other organizations to access your [place] medical records  [id number]        Your Vitals Were     Pulse Temperature Height Pulse Oximetry Breastfeeding? BMI (Body Mass Index)    66 98.6  F (37  C) (Tympanic) 5' 0.63\" (1.54 m) 98% No 30.98 kg/m2       Blood Pressure from Last 3 Encounters:   04/11/18 (!) 131/91   04/03/18 124/87   02/23/18 130/90    Weight from Last 3 Encounters:   04/11/18 162 lb (73.5 kg)   04/03/18 163 lb (73.9 kg)   02/23/18 163 lb (73.9 kg)              We Performed the Following     Basic metabolic panel     Vitamin B12     Vitamin D Deficiency        Primary Care Provider Office Phone # Fax #    Fawn Beatriz Talley [phone] [address] [facility] [address]        Equal Access to Services     CRAYL JON AH: Shala trinh " Ekaterinajuan, lidada luqadaha, qamichaelta kaallaya dunham, krista morse jazielky laarshbennie leander. So [facility] [phone].    ATENCIÓN: Si oneil mitchell, tiene a everett disposición servicios gratuitos de asistencia lingüística. Laurent al [phone].    We comply with applicable federal civil rights laws and [place] laws. We do not discriminate on the basis of race, color, national origin, age, disability, sex, sexual orientation, or gender identity.            Thank you!     Thank you for choosing [facility]  for your care. Our goal is always to provide you with excellent care. Hearing back from our patients is one way we can continue to improve our services. Please take a few minutes to complete the written survey that you may receive in the mail after your visit with us. Thank you!             Your Updated Medication List - Protect others around you: Learn how to safely use, store and throw away your medicines at www.disposemymeds.org.          This list is accurate as of 4/11/18  4:06 PM.  Always use your most recent med list.                   Brand Name Dispense Instructions for use Diagnosis    lisinopril 10 MG tablet    PRINIVIL/ZESTRIL    90 tablet    Take 1 tablet (10 mg) by mouth daily    Essential hypertension       * venlafaxine 37.5 MG 24 hr capsule    EFFEXOR-XR    90 capsule    Take with 75 mg for total of 112.5 mg once daily    Generalized anxiety disorder, Major depressive disorder, recurrent episode, moderate (H)       * venlafaxine 75 MG 24 hr capsule    EFFEXOR-XR    90 capsule    Take 1 capsule (75 mg) by mouth daily    Major depressive disorder, recurrent episode, moderate (H), Generalized anxiety disorder       * Notice:  This list has 2 medication(s) that are the same as other medications prescribed for you. Read the directions carefully, and ask your doctor or other care provider to review them with you.

## 2018-04-11 NOTE — PROGRESS NOTES
[facility]  [address] [facility] [address]  [phone]  Dept: [phone]    PRE-OP EVALUATION:  Today's date: 2018    Cecile Lim (: 1980) presents for pre-operative evaluation assessment as requested by Douglas Armas MD.  She requires evaluation and anesthesia risk assessment prior to undergoing surgery/procedure for treatment of SLING TRANSVAGINAL .    Proposed Surgery/ Procedure: Pubovaginal sling (Altis)  Date of Surgery/ Procedure: 2018  Time of Surgery/ Procedure: 7:30 AM  Hospital/Surgical [facility]  [facility]  Primary Physician: Fawn Talley  Type of Anesthesia Anticipated: Monitor Anesthesia Care    Patient has a Health Care Directive or Living Will:  NO    1. NO - Do you have a history of heart attack, stroke, stent, bypass or surgery on an artery in the head, neck, heart or legs?  2. NO - Do you ever have any pain or discomfort in your chest?  3. NO - Do you have a history of  Heart Failure?  4. NO - Are you troubled by shortness of breath when: walking on the level, up a slight hill or at night?  5. NO - Do you currently have a cold, bronchitis or other respiratory infection?  6. NO - Do you have a cough, shortness of breath or wheezing?  7. NO - Do you sometimes get pains in the calves of your legs when you walk?  8. NO - Do you or anyone in your family have previous history of blood clots?  9. NO - Do you or does anyone in your family have a serious bleeding problem such as prolonged bleeding following surgeries or cuts?  10. NO - Have you ever had problems with anemia or been told to take iron pills?  11. NO - Have you had any abnormal blood loss such as black, tarry or bloody stools, or abnormal vaginal bleeding?  12. NO - Have you ever had a blood transfusion?  13. NO - Have you or any of your relatives ever had problems with anesthesia?  14. YES- Do you have sleep apnea,  excessive snoring or daytime drowsiness?  15. NO - Do you have any prosthetic heart valves?  16. NO - Do you have prosthetic joints?  17. NO - Is there any chance that you may be pregnant?      HPI:     HPI related to upcoming procedure: stress incontinence,f vinicius conservative treatment.    Hypertension:  Well controlled.  Mild diastolic hypertension     Depression/Anxiety: well controlled      See problem list for active medical problems.  Problems all longstanding and stable, except as noted/documented.  See ROS for pertinent symptoms related to these conditions.                                                                                                                                                          .    MEDICAL HISTORY:     Patient Active Problem List    Diagnosis Date Noted     Encounter for surveillance of contraceptives 03/10/2016     Priority: Medium      has had vasectomy       Lipoma of back 11/12/2015     Priority: Medium     Major depressive disorder, recurrent episode, in partial remission (HCC) 12/23/2014     Priority: Medium     Essential hypertension 09/18/2012     Priority: Medium     Stress incontinence, female 02/27/2012     Priority: Medium     CARDIOVASCULAR SCREENING; LDL GOAL LESS THAN 160 10/31/2010     Priority: Medium     Generalized anxiety disorder 10/20/2010     Priority: Medium     Diagnosis updated by automated process. Provider to review and confirm.       Ureteral stone 07/09/2009     Priority: Medium      Past Medical History:   Diagnosis Date     LAURA (generalised anxiety disorder)      HTN (hypertension)      Suicide attempt 11/2014     Past Surgical History:   Procedure Laterality Date     NO HISTORY OF SURGERY  1/2007     Current Outpatient Prescriptions   Medication Sig Dispense Refill     venlafaxine (EFFEXOR-XR) 37.5 MG 24 hr capsule Take with 75 mg for total of 112.5 mg once daily 90 capsule 3     venlafaxine (EFFEXOR-XR) 75 MG 24 hr capsule Take 1  "capsule (75 mg) by mouth daily 90 capsule 3     lisinopril (PRINIVIL/ZESTRIL) 10 MG tablet Take 1 tablet (10 mg) by mouth daily 90 tablet 3     OTC products: None, except as noted above    No Known Allergies   Latex Allergy: NO    Social History   Substance Use Topics     Smoking status: Never Smoker     Smokeless tobacco: Never Used     Alcohol use 0.0 oz/week     0 Standard drinks or equivalent per week      Comment: Rare. Maybe one drink a month     History   Drug Use No       REVIEW OF SYSTEMS:   CONSTITUTIONAL: NEGATIVE for fever, chills, change in weight  ENT/MOUTH: NEGATIVE for ear, mouth and throat problems  RESP: NEGATIVE for significant cough or SOB  CV: NEGATIVE for chest pain, palpitations or peripheral edema    EXAM:   BP (!) 131/91 (BP Location: Right arm, Patient Position: Chair, Cuff Size: Adult Regular)  Pulse 66  Temp 98.6  F (37  C) (Tympanic)  Ht 5' 0.63\" (1.54 m)  Wt 162 lb (73.5 kg)  SpO2 98%  Breastfeeding? No  BMI 30.98 kg/m2  GENERAL APPEARANCE: healthy, alert and no distress  HENT: ear canals and TM's normal and nose and mouth without ulcers or lesions  RESP: lungs clear to auscultation - no rales, rhonchi or wheezes  CV: regular rate and rhythm, normal S1 S2, no S3 or S4 and no murmur, click or rub   ABDOMEN: soft, nontender, no HSM or masses and bowel sounds normal  NEURO: Normal strength and tone, sensory exam grossly normal, mentation intact and speech normal    DIAGNOSTICS:     Labs Drawn and in Process:   Unresulted Labs Ordered in the Past 30 Days of this Admission     No orders found from 2/10/2018 to 4/12/2018.          Recent Labs   Lab Test  02/23/18   1631  01/05/17   0834   09/16/12   1759   HGB  14.1   --    --   13.9   PLT  319   --    --   282   NA  134  139   < >  139   POTASSIUM  4.1  4.3   < >  3.9   CR  0.60  0.53   < >  0.55    < > = values in this interval not displayed.        IMPRESSION:   Reason for surgery/procedure: transvaginal sling  Diagnosis/reason for " consult: pre-op eval    The proposed surgical procedure is considered INTERMEDIATE risk.    REVISED CARDIAC RISK INDEX  The patient has the following serious cardiovascular risks for perioperative complications such as (MI, PE, VFib and 3  AV Block):  No serious cardiac risks  INTERPRETATION: 0 risks: Class I (very low risk - 0.4% complication rate)    The patient has the following additional risks for perioperative complications:  No identified additional risks      ICD-10-CM    1. Preop general physical exam Z01.818    2. Stress incontinence, female N39.3    3. Major depressive disorder, recurrent episode, in partial remission (HCC) F33.41    4. Generalized anxiety disorder F41.1    5. Essential hypertension I10 Basic metabolic panel       RECOMMENDATIONS:       --Patient is to take all scheduled medications on the day of surgery EXCEPT for modifications listed below.    ACE Inhibitor or Angiotensin Receptor Blocker (ARB) Use  Ace inhibitor or Angiotensin Receptor Blocker (ARB) and should HOLD this medication for the 24 hours prior to surgery.      APPROVAL GIVEN to proceed with proposed procedure, without further diagnostic evaluation       Signed Electronically by: Fawn Talley DO    Copy of this evaluation report is provided to requesting physician.    [facility] Preop Guidelines    Revised Cardiac Risk Index

## 2018-04-11 NOTE — NURSING NOTE
"Chief Complaint   Patient presents with     Pre-Op Exam       Initial /82  Pulse 66  Temp 98.6  F (37  C) (Tympanic)  Ht 5' 0.63\" (1.54 m)  Wt 162 lb (73.5 kg)  SpO2 98%  Breastfeeding? No  BMI 30.98 kg/m2 Estimated body mass index is 30.98 kg/(m^2) as calculated from the following:    Height as of this encounter: 5' 0.63\" (1.54 m).    Weight as of this encounter: 162 lb (73.5 kg).  Medication Reconciliation: complete   Britney Nice CMA (AAMA)   (aka: Carola Nice)      "

## 2018-04-12 LAB
DEPRECATED CALCIDIOL+CALCIFEROL SERPL-MC: 53 UG/L (ref 20–75)
VIT B12 SERPL-MCNC: 866 PG/ML (ref 193–986)

## 2018-04-17 ENCOUNTER — ANESTHESIA EVENT (OUTPATIENT)
Dept: SURGERY | Facility: CLINIC | Age: 38
End: 2018-04-17
Payer: COMMERCIAL

## 2018-04-27 ENCOUNTER — SURGERY (OUTPATIENT)
Age: 38
End: 2018-04-27

## 2018-04-27 ENCOUNTER — HOSPITAL ENCOUNTER (OUTPATIENT)
Facility: CLINIC | Age: 38
Discharge: HOME | End: 2018-04-27
Attending: UROLOGY | Admitting: UROLOGY
Payer: COMMERCIAL

## 2018-04-27 ENCOUNTER — ANESTHESIA (OUTPATIENT)
Dept: SURGERY | Facility: CLINIC | Age: 38
End: 2018-04-27
Payer: COMMERCIAL

## 2018-04-27 VITALS
SYSTOLIC BLOOD PRESSURE: 119 MMHG | OXYGEN SATURATION: 96 % | WEIGHT: 165 LBS | DIASTOLIC BLOOD PRESSURE: 72 MMHG | HEIGHT: 60 IN | BODY MASS INDEX: 32.39 KG/M2 | TEMPERATURE: 98.6 F | RESPIRATION RATE: 14 BRPM

## 2018-04-27 DIAGNOSIS — N39.3 STRESS INCONTINENCE, FEMALE: Primary | ICD-10-CM

## 2018-04-27 LAB — HCG UR QL: NEGATIVE

## 2018-04-27 PROCEDURE — 37000008 ZZH ANESTHESIA TECHNICAL FEE, 1ST 30 MIN: Performed by: UROLOGY

## 2018-04-27 PROCEDURE — 27110028 ZZH OR GENERAL SUPPLY NON-STERILE: Performed by: UROLOGY

## 2018-04-27 PROCEDURE — 57288 REPAIR BLADDER DEFECT: CPT | Performed by: UROLOGY

## 2018-04-27 PROCEDURE — 25000132 ZZH RX MED GY IP 250 OP 250 PS 637: Performed by: UROLOGY

## 2018-04-27 PROCEDURE — C1771 REP DEV, URINARY, W/SLING: HCPCS | Performed by: UROLOGY

## 2018-04-27 PROCEDURE — 25000125 ZZHC RX 250: Performed by: NURSE ANESTHETIST, CERTIFIED REGISTERED

## 2018-04-27 PROCEDURE — 37000009 ZZH ANESTHESIA TECHNICAL FEE, EACH ADDTL 15 MIN: Performed by: UROLOGY

## 2018-04-27 PROCEDURE — 25800025 ZZH RX 258: Performed by: UROLOGY

## 2018-04-27 PROCEDURE — 25000125 ZZHC RX 250: Performed by: UROLOGY

## 2018-04-27 PROCEDURE — 25000128 H RX IP 250 OP 636: Performed by: NURSE ANESTHETIST, CERTIFIED REGISTERED

## 2018-04-27 PROCEDURE — 25000128 H RX IP 250 OP 636: Performed by: UROLOGY

## 2018-04-27 PROCEDURE — 40000305 ZZH STATISTIC PRE PROC ASSESS I: Performed by: UROLOGY

## 2018-04-27 PROCEDURE — 71000027 ZZH RECOVERY PHASE 2 EACH 15 MINS: Performed by: UROLOGY

## 2018-04-27 PROCEDURE — 36000058 ZZH SURGERY LEVEL 3 EA 15 ADDTL MIN: Performed by: UROLOGY

## 2018-04-27 PROCEDURE — 36000056 ZZH SURGERY LEVEL 3 1ST 30 MIN: Performed by: UROLOGY

## 2018-04-27 PROCEDURE — 81025 URINE PREGNANCY TEST: CPT | Performed by: NURSE ANESTHETIST, CERTIFIED REGISTERED

## 2018-04-27 PROCEDURE — 27210794 ZZH OR GENERAL SUPPLY STERILE: Performed by: UROLOGY

## 2018-04-27 DEVICE — MESH SLING SINGLE INCISION ALTIS 519650: Type: IMPLANTABLE DEVICE | Site: VAGINA | Status: FUNCTIONAL

## 2018-04-27 RX ORDER — ONDANSETRON 2 MG/ML
INJECTION INTRAMUSCULAR; INTRAVENOUS PRN
Status: DISCONTINUED | OUTPATIENT
Start: 2018-04-27 | End: 2018-04-27

## 2018-04-27 RX ORDER — CEFAZOLIN SODIUM 1 G/50ML
1 INJECTION, SOLUTION INTRAVENOUS SEE ADMIN INSTRUCTIONS
Status: DISCONTINUED | OUTPATIENT
Start: 2018-04-27 | End: 2018-04-27 | Stop reason: HOSPADM

## 2018-04-27 RX ORDER — SODIUM CHLORIDE, SODIUM LACTATE, POTASSIUM CHLORIDE, CALCIUM CHLORIDE 600; 310; 30; 20 MG/100ML; MG/100ML; MG/100ML; MG/100ML
INJECTION, SOLUTION INTRAVENOUS CONTINUOUS
Status: DISCONTINUED | OUTPATIENT
Start: 2018-04-27 | End: 2018-04-27 | Stop reason: HOSPADM

## 2018-04-27 RX ORDER — KETOROLAC TROMETHAMINE 30 MG/ML
30 INJECTION, SOLUTION INTRAMUSCULAR; INTRAVENOUS EVERY 6 HOURS PRN
Status: DISCONTINUED | OUTPATIENT
Start: 2018-04-27 | End: 2018-04-27 | Stop reason: HOSPADM

## 2018-04-27 RX ORDER — DEXAMETHASONE SODIUM PHOSPHATE 4 MG/ML
INJECTION, SOLUTION INTRA-ARTICULAR; INTRALESIONAL; INTRAMUSCULAR; INTRAVENOUS; SOFT TISSUE PRN
Status: DISCONTINUED | OUTPATIENT
Start: 2018-04-27 | End: 2018-04-27

## 2018-04-27 RX ORDER — FENTANYL CITRATE 50 UG/ML
25-50 INJECTION, SOLUTION INTRAMUSCULAR; INTRAVENOUS
Status: DISCONTINUED | OUTPATIENT
Start: 2018-04-27 | End: 2018-04-27 | Stop reason: HOSPADM

## 2018-04-27 RX ORDER — LIDOCAINE 40 MG/G
CREAM TOPICAL
Status: DISCONTINUED | OUTPATIENT
Start: 2018-04-27 | End: 2018-04-27 | Stop reason: HOSPADM

## 2018-04-27 RX ORDER — FENTANYL CITRATE 50 UG/ML
INJECTION, SOLUTION INTRAMUSCULAR; INTRAVENOUS PRN
Status: DISCONTINUED | OUTPATIENT
Start: 2018-04-27 | End: 2018-04-27

## 2018-04-27 RX ORDER — HYDROMORPHONE HYDROCHLORIDE 1 MG/ML
.3-.5 INJECTION, SOLUTION INTRAMUSCULAR; INTRAVENOUS; SUBCUTANEOUS EVERY 10 MIN PRN
Status: DISCONTINUED | OUTPATIENT
Start: 2018-04-27 | End: 2018-04-27 | Stop reason: HOSPADM

## 2018-04-27 RX ORDER — HYDROCODONE BITARTRATE AND ACETAMINOPHEN 5; 325 MG/1; MG/1
1-2 TABLET ORAL EVERY 4 HOURS PRN
Qty: 5 TABLET | Refills: 0 | Status: SHIPPED | OUTPATIENT
Start: 2018-04-27 | End: 2018-05-01

## 2018-04-27 RX ORDER — CEFAZOLIN SODIUM 2 G/100ML
2 INJECTION, SOLUTION INTRAVENOUS
Status: COMPLETED | OUTPATIENT
Start: 2018-04-27 | End: 2018-04-27

## 2018-04-27 RX ORDER — KETOROLAC TROMETHAMINE 30 MG/ML
INJECTION, SOLUTION INTRAMUSCULAR; INTRAVENOUS PRN
Status: DISCONTINUED | OUTPATIENT
Start: 2018-04-27 | End: 2018-04-27

## 2018-04-27 RX ORDER — HYDROCODONE BITARTRATE AND ACETAMINOPHEN 5; 325 MG/1; MG/1
1 TABLET ORAL ONCE
Status: COMPLETED | OUTPATIENT
Start: 2018-04-27 | End: 2018-04-27

## 2018-04-27 RX ORDER — NALOXONE HYDROCHLORIDE 0.4 MG/ML
.1-.4 INJECTION, SOLUTION INTRAMUSCULAR; INTRAVENOUS; SUBCUTANEOUS
Status: DISCONTINUED | OUTPATIENT
Start: 2018-04-27 | End: 2018-04-27 | Stop reason: HOSPADM

## 2018-04-27 RX ORDER — ONDANSETRON 2 MG/ML
4 INJECTION INTRAMUSCULAR; INTRAVENOUS EVERY 30 MIN PRN
Status: DISCONTINUED | OUTPATIENT
Start: 2018-04-27 | End: 2018-04-27 | Stop reason: HOSPADM

## 2018-04-27 RX ORDER — ONDANSETRON 4 MG/1
4 TABLET, ORALLY DISINTEGRATING ORAL EVERY 30 MIN PRN
Status: DISCONTINUED | OUTPATIENT
Start: 2018-04-27 | End: 2018-04-27 | Stop reason: HOSPADM

## 2018-04-27 RX ORDER — MEPERIDINE HYDROCHLORIDE 50 MG/ML
12.5 INJECTION INTRAMUSCULAR; INTRAVENOUS; SUBCUTANEOUS
Status: DISCONTINUED | OUTPATIENT
Start: 2018-04-27 | End: 2018-04-27 | Stop reason: HOSPADM

## 2018-04-27 RX ORDER — PROPOFOL 10 MG/ML
INJECTION, EMULSION INTRAVENOUS CONTINUOUS PRN
Status: DISCONTINUED | OUTPATIENT
Start: 2018-04-27 | End: 2018-04-27

## 2018-04-27 RX ADMIN — ONDANSETRON 4 MG: 2 INJECTION INTRAMUSCULAR; INTRAVENOUS at 07:35

## 2018-04-27 RX ADMIN — POLYMYXIN B 100 ML: 500000 INJECTION, POWDER, LYOPHILIZED, FOR SOLUTION INTRAMUSCULAR; INTRATHECAL; INTRAVENOUS; OPHTHALMIC at 08:02

## 2018-04-27 RX ADMIN — LIDOCAINE HYDROCHLORIDE 1 ML: 10 INJECTION, SOLUTION EPIDURAL; INFILTRATION; INTRACAUDAL; PERINEURAL at 07:13

## 2018-04-27 RX ADMIN — PROPOFOL 100 MCG/KG/MIN: 10 INJECTION, EMULSION INTRAVENOUS at 07:35

## 2018-04-27 RX ADMIN — FENTANYL CITRATE 100 MCG: 50 INJECTION, SOLUTION INTRAMUSCULAR; INTRAVENOUS at 07:33

## 2018-04-27 RX ADMIN — HYDROCODONE BITARTRATE AND ACETAMINOPHEN 1 TABLET: 5; 325 TABLET ORAL at 09:28

## 2018-04-27 RX ADMIN — SODIUM CHLORIDE, POTASSIUM CHLORIDE, SODIUM LACTATE AND CALCIUM CHLORIDE: 600; 310; 30; 20 INJECTION, SOLUTION INTRAVENOUS at 07:13

## 2018-04-27 RX ADMIN — MIDAZOLAM HYDROCHLORIDE 5 MG: 1 INJECTION, SOLUTION INTRAMUSCULAR; INTRAVENOUS at 07:31

## 2018-04-27 RX ADMIN — DEXAMETHASONE SODIUM PHOSPHATE 8 MG: 4 INJECTION, SOLUTION INTRA-ARTICULAR; INTRALESIONAL; INTRAMUSCULAR; INTRAVENOUS; SOFT TISSUE at 07:35

## 2018-04-27 RX ADMIN — KETOROLAC TROMETHAMINE 30 MG: 30 INJECTION, SOLUTION INTRAMUSCULAR at 07:43

## 2018-04-27 RX ADMIN — CEFAZOLIN SODIUM 2 G: 2 INJECTION, SOLUTION INTRAVENOUS at 07:28

## 2018-04-27 NOTE — OR NURSING
Packing removed by RN.  No discomfort with removal.  Packing intact, moderate amount of moist bloody drainage on packing.  Patient up to bathroom and voided.  Bladder scan showed 0 residual.

## 2018-04-27 NOTE — ANESTHESIA PREPROCEDURE EVALUATION
Anesthesia Evaluation     . Pt has had prior anesthetic. Type: General and MAC    No history of anesthetic complications          ROS/MED HX    ENT/Pulmonary:  - neg pulmonary ROS     Neurologic:  - neg neurologic ROS     Cardiovascular:     (+) hypertension----. : . . . :. .       METS/Exercise Tolerance:  >4 METS   Hematologic:  - neg hematologic  ROS       Musculoskeletal:  - neg musculoskeletal ROS       GI/Hepatic:  - neg GI/hepatic ROS       Renal/Genitourinary: Comment: Stress incontinence, female    (+) Nephrolithiasis ,       Endo:  - neg endo ROS       Psychiatric: Comment: Suicide attempt            (+) psychiatric history anxiety and depression      Infectious Disease:  - neg infectious disease ROS       Malignancy:      - no malignancy   Other:    - neg other ROS                 Physical Exam  Normal systems: cardiovascular, pulmonary and dental    Airway   Mallampati: II  TM distance: >3 FB  Neck ROM: full    Dental     Cardiovascular   Rhythm and rate: regular and normal      Pulmonary    breath sounds clear to auscultation                    Anesthesia Plan      History & Physical Review  History and physical reviewed and following examination; no interval change.    ASA Status:  2 .    NPO Status:  > 8 hours    Plan for MAC Reason for MAC:  Deep or markedly invasive procedure (G8)  PONV prophylaxis:  Ondansetron (or other 5HT-3) and Dexamethasone or Solumedrol       Postoperative Care      Consents  Anesthetic plan, risks, benefits and alternatives discussed with:  Patient..                          .

## 2018-04-27 NOTE — ANESTHESIA POSTPROCEDURE EVALUATION
Patient: Cecile Ramírez Strub    Procedure(s):  Pubovaginal sling (Altis) - Wound Class: II-Clean Contaminated    Diagnosis:Stress urinary incontinence  Diagnosis Additional Information: No value filed.    Anesthesia Type:  MAC    Note:  Anesthesia Post Evaluation    Patient location during evaluation: Phase 2 and Bedside  Patient participation: Able to fully participate in evaluation  Level of consciousness: awake and alert  Pain management: adequate  Airway patency: patent  Cardiovascular status: acceptable  Respiratory status: acceptable  Hydration status: acceptable  PONV: none     Anesthetic complications: None          Last vitals:  Vitals:    04/27/18 0641 04/27/18 0813   BP: 135/80 108/70   Resp: 16 12   Temp: 37  C (98.6  F)    SpO2: 96% 92%         Electronically Signed By: Avery Gonzalez CRNA, APRN CRNA  April 27, 2018  8:28 AM

## 2018-04-27 NOTE — IP AVS SNAPSHOT
[facility] PreOP/Phase II    [address] [address]    Phone:  [phone]    Fax:  [phone]                                       After Visit Summary   4/27/2018    Cecile Lim    MRN: [id number]           After Visit Summary Signature Page     I have received my discharge instructions, and my questions have been answered. I have discussed any challenges I see with this plan with the nurse or doctor.    ..........................................................................................................................................  Patient/Patient Representative Signature      ..........................................................................................................................................  Patient Representative Print Name and Relationship to Patient    ..................................................               ................................................  Date                                            Time    ..........................................................................................................................................  Reviewed by Signature/Title    ...................................................              ..............................................  Date                                                            Time

## 2018-04-27 NOTE — OP NOTE
Procedure Date: 04/27/2018      PROCEDURE:  Pubovaginal sling (Altis).       PREOPERATIVE DIAGNOSIS:  Stress urinary incontinence with hypermobility.       POSTOPERATIVE DIAGNOSIS:  Stress urinary incontinence with hypermobility.       ANESTHESIA:  Monitored anesthesia care.       SURGEON:  Douglas Hopkins MD.       BLOOD LOSS:  25 mL.       DRAINS:  None.       COMPLICATIONS:  None.       SITE OF SURGERY:  [facility] operating room.      INDICATIONS FOR SURGERY:  This is a 37-year-old white female who has undergone extensive outpatient evaluation for progressive stress urinary incontinence.  Pertinent findings on examination include moderate to marked hypermobility of the bladder neck and urethra with grade I anterior compartment defect.  Cystoscopic findings revealed normal bladder mucosa, capacity under 500 mL with moderate hypermobility and moderate demonstrable leakage with Valsalva maneuver.  In light of these findings, Mrs. Lim is offered pubovaginal sling using the Altis technique.  Informed consent was carefully obtained and documented in the outpatient record.       SURGERY IN DETAIL:  The patient was brought to the operating room and carefully positioned supine.  General endotracheal anesthesia was administered without incident.  Prophylactic antibiotics were administered.  Pneumatic stockings were placed.  She was then carefully positioned in low lithotomy, and a sterile prep and drape was performed.  Surgical timeout was performed per protocol.       The vagina was prepared for surgery with a 20 Greek Nolan catheter and [place] retractor.  Laxity of support at the bladder neck and urethra were again appreciated.  A marking pen was used to delineate the line of the proposed incision over the mid urethra.  The subepithelial tissue was infiltrated with sterile saline.  A #15-blade was used to make a longitudinal incision.  A combination of sharp and blunt dissection was carried back to the  ventral portion of the anterior ramus.  This was directed at approximately 2 and 10 o'clock.       At this point, the Altis sling was carefully loaded onto the right side passing instrument.  The static anchor was then passed into the right obturator membrane at approximately the 10 o'clock position in an external rotational manner.  The introducer was carefully withdrawn in a reverse helical fashion.  Attention was performed to ensure adequate anchoring.  The left side instrument was then carefully secured to the Dynamic anchor, which was carefully freed from the suture with gentle motion.  This anchor was then carefully secured into the patient's left obturator membrane at approximately 2 o'clock, again using external rotation.  At this point, tensioning was performed to ensure that the Altis sling lay appropriately on the urethra with appropriate tension.  Once this was ascertained, the redundant portion of the tensioning suture was transected and removed.  Copious antibiotic irrigation was used.  Meticulous hemostasis was documented.  Closure was performed with a running, locked stitch of 2-0 Vicryl.  A vaginal packing soaked in antibiotic solution was temporarily placed.  The Nolan catheter and retractor were withdrawn.       Cystoscopy was performed under real time video control with the 70-degree optic.  There was no evidence of mesh or suture in the lower urinary tract.  The bladder was drained and the scope was withdrawn.  The patient tolerated the procedure in a satisfactory manner and was brought to the recovery in stable condition.  There were no operative complications.         DEEJAY ALANIS MD             D: 2018   T: 2018   MT: JADE      Name:     DEVAN BUENO   MRN:      [id number]        Account:        [id number]   :      1980           Procedure Date: 2018      Document: [id number]

## 2018-04-27 NOTE — IP AVS SNAPSHOT
MRN:[id number]                      After Visit Summary   4/27/2018    Cecile Lim    MRN: [id number]           Thank you!     Thank you for choosing [facility] for your care. Our goal is always to provide you with excellent care. Hearing back from our patients is one way we can continue to improve our services. Please take a few minutes to complete the written survey that you may receive in the mail after you visit with us. Thank you!        Patient Information     Date Of Birth          1980        About your hospital stay     You were admitted on:  April 27, 2018 You last received care in the:  [facility] PreOP/Phase II    You were discharged on:  April 27, 2018       Who to Call     For medical emergencies, please call 911.  For non-urgent questions about your medical care, please call your primary care provider or clinic, [phone]  For questions related to your surgery, please call your surgery clinic        Attending Provider     Provider Specialty    Douglas Hopkins MD Urology       Primary Care Provider Office Phone # Fax #    Fawn Beatriz DO [place] [phone]      Further instructions from your care team                       Same Day Surgery Discharge Instructions  Special Precautions After Surgery - Adult    1. It is not unusual to feel lightheaded or faint, up to 24 hours after surgery or while taking pain medication.  If you have these symptoms; sit for a few minutes before standing and have someone assist you when getting up.  2. You should rest and relax for the next 24 hours and must have someone stay with you for at least 24 hours after your discharge.  3. DO NOT DRIVE any vehicle or operate mechanical equipment for 24 hours following the end of your surgery.  DO NOT DRIVE while taking narcotic pain medications that have been prescribed by your physician.  If you had a limb operated on, you must be able to use it fully to drive.  4. DO NOT  drink alcoholic beverages for 24 hours following surgery or while taking prescription pain medication.  5. Drink clear liquids (apple juice, ginger ale, broth, 7-Up, etc.).  Progress to your regular diet as you feel able.  6. Any questions call your physician and do not make important decisions for 24 hours.    ACTIVITY  ? Per MD instructions     Call for an appointment to return to the clinic:  Per MD instructions    Medications:  ? Follow instructions on bottle     Additional discharge instructions: Per MD instructions given at preoperative appointment.  __________________________________________________________________________________________________________________________________  IMPORTANT NUMBERS:    [facility] Main Number:  [phone], [phone]  Pharmacy:  [phone]  Same Day Surgery:  [phone], Monday - Friday until 8:30 p.m.  Urgent Care:  [phone]  Emergency Room:  [phone]                                                                                 Surgery Specialty Clinic:  [phone] - Dr. Hopkins          Pending Results     No orders found from 4/25/2018 to 4/28/2018.            Admission Information     Date & Time Provider Department Dept. Phone    4/27/2018 Douglas Hopkins MD [facility] PreOP/Phase [phone]      Your Vitals Were     Blood Pressure Temperature Respirations Height Weight Last Period    108/70 (Cuff Size: Adult Regular) 98.6  F (37  C) (Oral) 12 1.524 m (5') 74.8 kg (165 lb) 04/27/2018    Pulse Oximetry BMI (Body Mass Index)                92% 32.22 kg/m2          MyChart Information     [employer] gives you secure access to your electronic health record. If you see a primary care provider, you can also send messages to your care team and make appointments. If you have questions, please call your primary care clinic.  If you do not have a primary care provider, please call [phone] and they will assist you.        Care EveryWhere ID     This  is your Care EveryWhere ID. This could be used by other organizations to access your [place] medical records  [id number]        Equal Access to Services     CARYL JON : Hadii aad ku hadartieisha Camargo, wajuniorda lumagdywadeha, qamichaelta kaalmada roly, krista kelyin hayaan nathalierory gallardo lacici tabor. So [facility] [phone].    ATENCIÓN: Si habla español, tiene a everett disposición servicios gratuitos de asistencia lingüística. Llame al [phone].    We comply with applicable federal civil rights laws and [place] laws. We do not discriminate on the basis of race, color, national origin, age, disability, sex, sexual orientation, or gender identity.               Review of your medicines      UNREVIEWED medicines. Ask your doctor about these medicines        Dose / Directions    lisinopril 10 MG tablet   Commonly known as:  PRINIVIL/ZESTRIL   Used for:  Essential hypertension        Dose:  10 mg   Take 1 tablet (10 mg) by mouth daily   Quantity:  90 tablet   Refills:  3       * venlafaxine 37.5 MG 24 hr capsule   Commonly known as:  EFFEXOR-XR   Used for:  Generalized anxiety disorder, Major depressive disorder, recurrent episode, moderate (H)        Take with 75 mg for total of 112.5 mg once daily   Quantity:  90 capsule   Refills:  3       * venlafaxine 75 MG 24 hr capsule   Commonly known as:  EFFEXOR-XR   Indication:  increasing about once per week to 150 mb   Used for:  Major depressive disorder, recurrent episode, moderate (H), Generalized anxiety disorder        Dose:  75 mg   Take 1 capsule (75 mg) by mouth daily   Quantity:  90 capsule   Refills:  3       * Notice:  This list has 2 medication(s) that are the same as other medications prescribed for you. Read the directions carefully, and ask your doctor or other care provider to review them with you.      START taking        Dose / Directions    HYDROcodone-acetaminophen 5-325 MG per tablet   Commonly known as:  NORCO   Used for:  Stress incontinence, female         Dose:  1-2 tablet   Take 1-2 tablets by mouth every 4 hours as needed for severe pain (Moderate to Severe Pain)   Quantity:  5 tablet   Refills:  0            Where to get your medicines      Some of these will need a paper prescription and others can be bought over the counter. Ask your nurse if you have questions.     Bring a paper prescription for each of these medications     HYDROcodone-acetaminophen 5-325 MG per tablet                Protect others around you: Learn how to safely use, store and throw away your medicines at www.disposemymeds.org.        Information about OPIOIDS     PRESCRIPTION OPIOIDS: WHAT YOU NEED TO KNOW   You have a prescription for an opioid (narcotic) pain medicine. Opioids can cause addiction. If you have a history of chemical dependency of any type, you are at a higher risk of becoming addicted to opioids. Only take this medicine after all other options have been tried. Take it for as short a time and as few doses as possible.     Do not:    Drive. If you drive while taking these medicines, you could be arrested for driving under the influence (DUI).    Operate heavy machinery    Do any other dangerous activities while taking these medicines.     Drink any alcohol while taking these medicines.      Take with any other medicines that contain acetaminophen. Read all labels carefully. Look for the word  acetaminophen  or  Tylenol.  Ask your pharmacist if you have questions or are unsure.    Store your pills in a secure place, locked if possible. We will not replace any lost or stolen medicine. If you don t finish your medicine, please throw away (dispose) as directed by your pharmacist. The [place] Pollution Control Agency has more information about safe disposal: https://www.pca.[facility].[place].us/living-green/managing-unwanted-medications    All opioids tend to cause constipation. Drink plenty of water and eat foods that have a lot of fiber, such as fruits, vegetables, prune juice, apple  juice and high-fiber cereal. Take a laxative (Miralax, milk of magnesia, Colace, Senna) if you don t move your bowels at least every other day.              Medication List: This is a list of all your medications and when to take them. Check marks below indicate your daily home schedule. Keep this list as a reference.      Medications           Morning Afternoon Evening Bedtime As Needed    HYDROcodone-acetaminophen 5-325 MG per tablet   Commonly known as:  NORCO   Take 1-2 tablets by mouth every 4 hours as needed for severe pain (Moderate to Severe Pain)                                lisinopril 10 MG tablet   Commonly known as:  PRINIVIL/ZESTRIL   Take 1 tablet (10 mg) by mouth daily                                * venlafaxine 37.5 MG 24 hr capsule   Commonly known as:  EFFEXOR-XR   Take with 75 mg for total of 112.5 mg once daily                                * venlafaxine 75 MG 24 hr capsule   Commonly known as:  EFFEXOR-XR   Take 1 capsule (75 mg) by mouth daily                                * Notice:  This list has 2 medication(s) that are the same as other medications prescribed for you. Read the directions carefully, and ask your doctor or other care provider to review them with you.

## 2018-04-27 NOTE — ANESTHESIA CARE TRANSFER NOTE
Patient: Cecile Ramírez Strub    Procedure(s):  Pubovaginal sling (Altis) - Wound Class: II-Clean Contaminated    Diagnosis: Stress urinary incontinence  Diagnosis Additional Information: No value filed.    Anesthesia Type:   MAC     Note:  Airway :Room Air  Patient transferred to:Phase II  Handoff Report: Identifed the Patient, Identified the Reponsible Provider, Reviewed the pertinent medical history, Discussed the surgical course, Reviewed Intra-OP anesthesia mangement and issues during anesthesia, Set expectations for post-procedure period and Allowed opportunity for questions and acknowledgement of understanding      Vitals: (Last set prior to Anesthesia Care Transfer)    CRNA VITALS  4/27/2018 0737 - 4/27/2018 0816      4/27/2018             Pulse: 73    SpO2: 98 %                Electronically Signed By: Avery Gonzalez CRNA, APRN CRNA  April 27, 2018  8:16 AM

## 2018-04-27 NOTE — DISCHARGE INSTRUCTIONS
Same Day Surgery Discharge Instructions  Special Precautions After Surgery - Adult    1. It is not unusual to feel lightheaded or faint, up to 24 hours after surgery or while taking pain medication.  If you have these symptoms; sit for a few minutes before standing and have someone assist you when getting up.  2. You should rest and relax for the next 24 hours and must have someone stay with you for at least 24 hours after your discharge.  3. DO NOT DRIVE any vehicle or operate mechanical equipment for 24 hours following the end of your surgery.  DO NOT DRIVE while taking narcotic pain medications that have been prescribed by your physician.  If you had a limb operated on, you must be able to use it fully to drive.  4. DO NOT drink alcoholic beverages for 24 hours following surgery or while taking prescription pain medication.  5. Drink clear liquids (apple juice, ginger ale, broth, 7-Up, etc.).  Progress to your regular diet as you feel able.  6. Any questions call your physician and do not make important decisions for 24 hours.    ACTIVITY  ? Per MD instructions     Call for an appointment to return to the clinic:  Per MD instructions    Medications:  ? Follow instructions on bottle     Additional discharge instructions: Per MD instructions given at preoperative appointment.  __________________________________________________________________________________________________________________________________  IMPORTANT NUMBERS:    [facility] Main Number:  [phone], [phone]  Pharmacy:  [phone]  Same Day Surgery:  [phone], Monday - Friday until 8:30 p.m.  Urgent Care:  [phone]  Emergency Room:  [phone]                                                                                 Surgery Specialty Clinic:  [phone] - Dr. Hopkins

## 2018-04-30 ENCOUNTER — NURSE TRIAGE (OUTPATIENT)
Dept: NURSING | Facility: CLINIC | Age: 38
End: 2018-04-30

## 2018-04-30 ENCOUNTER — TELEPHONE (OUTPATIENT)
Dept: UROLOGY | Facility: CLINIC | Age: 38
End: 2018-04-30

## 2018-04-30 NOTE — TELEPHONE ENCOUNTER
Reason for Call:  Other call back    Detailed comments: Patient is checking status of previous message. Please call.    Phone Number Patient can be reached at: Home number on file [phone] (home)    Best Time: any    Can we leave a detailed message on this number? YES    Call taken on 4/30/2018 at 3:30 PM by Cheryl Merritt

## 2018-04-30 NOTE — TELEPHONE ENCOUNTER
Reason for Call:  Other     Detailed comments: Pt had surgery 04/27. She feels like there is a bulge in the vaginal area - can see the suture area and above that there is a bulge. Not experiencing any pain. Feels like she needs to keep her legs together - Please advise    Phone Number Patient can be reached at: Home number on file [phone] (home) or work #: [phone]    Best Time: Any    Can we leave a detailed message on this number? YES    Call taken on 4/30/2018 at 8:02 AM by Denise Behrendt

## 2018-04-30 NOTE — TELEPHONE ENCOUNTER
Called and spoke to patient.   Patient states that she feels that her vagina dropped.   Patient feels as if it is prolapsed.   Next day follow up appointment scheduled. Maren Faye RN     .

## 2018-04-30 NOTE — TELEPHONE ENCOUNTER
Reason for Disposition    [1] Something is hanging out of the vagina AND [2] cannot easily be pushed back inside    Additional Information    Negative: Patient sounds very sick or weak to the triager    Negative: Followed a genital area injury    Negative: Foreign body in vagina (e.g., tampon)    Negative: Vaginal bleeding is main symptom    Negative: Vaginal discharge is main symptom    Negative: Pain or burning with urination is main symptom    Negative: Menstrual cramps is main symptom    Negative: Abdominal pain is main symptom    Negative: Pubic lice suspected    Negative: Itching or rash of external female genital area (vulva)    Negative: Sounds like a life-threatening emergency to the triager    Negative: [1] SEVERE pain AND [2] not improved 2 hours after pain medicine    Negative: [1] Genital area looks infected (e.g., draining sore, spreading redness) AND [2] fever    Protocols used: VAGINAL SYMPTOMS-ADULT-AH  Caller states she had bladder surgery on the 27th were a stent was placed. Caller states she now has something bulging between her legs and feels like it is her vagina and not her uterus. Triage guidelines recommend to see provider within 4 hours. Triager called out to answering service, left message for on call Urologist dr. Cason to call triager at [phone].

## 2018-04-30 NOTE — TELEPHONE ENCOUNTER
"Pt is post Transvaginal Sling on 04-27-18.  Pt calling with concern that her vagina may have prolapsed.  She reports that she felt a bulge this am when taking a shower and since then she reports, \"I have to keep my legs together to keep my inerds from falling out\".  She denies having any pain with this and light spotting but is having menses at this time.  She believes this may have occurred yesterday.  She denies having any problems with passing her urine and no problems with passing her stools.  She has not had to strain to pass her stools and she denies having done any lifting.  She was on her feet more on Saturday.      Advised pt that I would bring this concern to Dr. Hopkins's attention and will get back to her with plan.  Pt agrees with this plan at this time.    Polly Cosby  [facility] RN  "

## 2018-05-01 ENCOUNTER — OFFICE VISIT (OUTPATIENT)
Dept: UROLOGY | Facility: CLINIC | Age: 38
End: 2018-05-01
Payer: COMMERCIAL

## 2018-05-01 VITALS
OXYGEN SATURATION: 97 % | RESPIRATION RATE: 12 BRPM | WEIGHT: 165 LBS | SYSTOLIC BLOOD PRESSURE: 134 MMHG | BODY MASS INDEX: 32.39 KG/M2 | TEMPERATURE: 98.2 F | HEIGHT: 60 IN | HEART RATE: 75 BPM | DIASTOLIC BLOOD PRESSURE: 86 MMHG

## 2018-05-01 DIAGNOSIS — N39.3 STRESS INCONTINENCE, FEMALE: Primary | ICD-10-CM

## 2018-05-01 DIAGNOSIS — R82.90 NONSPECIFIC FINDING ON EXAMINATION OF URINE: ICD-10-CM

## 2018-05-01 LAB
ALBUMIN UR-MCNC: NEGATIVE MG/DL
APPEARANCE UR: ABNORMAL
BILIRUB UR QL STRIP: NEGATIVE
CAOX CRY #/AREA URNS HPF: ABNORMAL /HPF
COLOR UR AUTO: YELLOW
GLUCOSE UR STRIP-MCNC: NEGATIVE MG/DL
HGB UR QL STRIP: ABNORMAL
KETONES UR STRIP-MCNC: NEGATIVE MG/DL
LEUKOCYTE ESTERASE UR QL STRIP: ABNORMAL
NITRATE UR QL: NEGATIVE
NON-SQ EPI CELLS #/AREA URNS LPF: ABNORMAL /LPF
PH UR STRIP: 5.5 PH (ref 5–7)
RBC #/AREA URNS AUTO: ABNORMAL /HPF
SOURCE: ABNORMAL
SP GR UR STRIP: >1.03 (ref 1–1.03)
UROBILINOGEN UR STRIP-ACNC: 0.2 EU/DL (ref 0.2–1)
WBC #/AREA URNS AUTO: ABNORMAL /HPF

## 2018-05-01 PROCEDURE — 87086 URINE CULTURE/COLONY COUNT: CPT | Performed by: UROLOGY

## 2018-05-01 PROCEDURE — 81001 URINALYSIS AUTO W/SCOPE: CPT | Performed by: UROLOGY

## 2018-05-01 PROCEDURE — 99024 POSTOP FOLLOW-UP VISIT: CPT | Performed by: UROLOGY

## 2018-05-01 NOTE — PROGRESS NOTES
"S/p sling 5 d ago, feels like her \"vagina is falling out.\"    Denies dysuria, gross hematuria, pain. Voiding fine.    PE: Excellent support, nontender      Results for orders placed or performed in visit on 05/01/18   UA reflex to Microscopic and Culture [[id number]]   Result Value Ref Range    Color Urine Yellow     Appearance Urine Slightly Cloudy     Glucose Urine Negative NEG^Negative mg/dL    Bilirubin Urine Negative NEG^Negative    Ketones Urine Negative NEG^Negative mg/dL    Specific Gravity Urine >1.030 1.003 - 1.035    Blood Urine Large (A) NEG^Negative    pH Urine 5.5 5.0 - 7.0 pH    Protein Albumin Urine Negative NEG^Negative mg/dL    Urobilinogen Urine 0.2 0.2 - 1.0 EU/dL    Nitrite Urine Negative NEG^Negative    Leukocyte Esterase Urine Small (A) NEG^Negative    Source Midstream Urine    Urine Microscopic   Result Value Ref Range    WBC Urine 5-10 (A) OTO5^0 - 5 /HPF    RBC Urine 5-10 (A) OTO2^O - 2 /HPF    Squamous Epithelial /LPF Urine Few FEW^Few /LPF    Calcium Oxalate Few (A) NEG^Negative /HPF     (menses)      IMP:  Satisfactory support after sling 4 d ago      PLAN:  1. Usual precautions  2. RTC 1 mo as scheduled              "

## 2018-05-01 NOTE — PATIENT INSTRUCTIONS
If you have questions or concerns on any instructions given to you by your provider today or if you need to schedule an appointment, you can reach us at [phone].

## 2018-05-01 NOTE — MR AVS SNAPSHOT
After Visit Summary   5/1/2018    Cecile Lim    MRN: [id number]           Patient Information     Date Of Birth          1980        Visit Information        Provider Department      5/1/2018 2:00 PM Douglas Hopkins MD [facility]        Today's Diagnoses     Stress incontinence, female    -  1    Nonspecific finding on examination of urine          Care Instructions    If you have questions or concerns on any instructions given to you by your provider today or if you need to schedule an appointment, you can reach us at [phone].                         Follow-ups after your visit        Who to contact     If you have questions or need follow up information about today's clinic visit or your schedule please contact [facility] directly at [phone].  Normal or non-critical lab and imaging results will be communicated to you by MyChart, letter or phone within 4 business days after the clinic has received the results. If you do not hear from us within 7 days, please contact the clinic through [employer] or phone. If you have a critical or abnormal lab result, we will notify you by phone as soon as possible.  Submit refill requests through [employer] or call your pharmacy and they will forward the refill request to us. Please allow 3 business days for your refill to be completed.          Additional Information About Your Visit        MyChart Information     [employer] gives you secure access to your electronic health record. If you see a primary care provider, you can also send messages to your care team and make appointments. If you have questions, please call your primary care clinic.  If you do not have a primary care provider, please call [phone] and they will assist you.        Care EveryWhere ID     This is your Care EveryWhere ID. This could be used by other organizations to access your [place] medical records  [id number]        Your Vitals Were      Pulse Temperature Respirations Height Last Period Pulse Oximetry    75 98.2  F (36.8  C) (Oral) 12 1.524 m (5') 04/27/2018 97%    BMI (Body Mass Index)                   32.22 kg/m2            Blood Pressure from Last 3 Encounters:   05/01/18 134/86   04/27/18 119/72   04/11/18 126/82    Weight from Last 3 Encounters:   05/01/18 74.8 kg (165 lb)   04/27/18 74.8 kg (165 lb)   04/11/18 73.5 kg (162 lb)              We Performed the Following     UA reflex to Microscopic and Culture [[id number]]     Urine Culture Aerobic Bacterial     Urine Microscopic        Primary Care Provider Office Phone # Fax #    Fawn Talley,  [phone] [address] [facility] [address]        Equal Access to Services     CARYL JON : Hadii delmy khan hadasho Soomaali, waaxda luqadaha, qaybta kaalmada adeegyada, krista richardson haypatricia oliveros . So [facility] [phone].    ATENCIÓN: Si habla español, tiene a everett disposición servicios gratuitos de asistencia lingüística. Llame al [phone].    We comply with applicable federal civil rights laws and [place] laws. We do not discriminate on the basis of race, color, national origin, age, disability, sex, sexual orientation, or gender identity.            Thank you!     Thank you for choosing [facility]  for your care. Our goal is always to provide you with excellent care. Hearing back from our patients is one way we can continue to improve our services. Please take a few minutes to complete the written survey that you may receive in the mail after your visit with us. Thank you!             Your Updated Medication List - Protect others around you: Learn how to safely use, store and throw away your medicines at www.disposemymeds.org.          This list is accurate as of 5/1/18  2:22 PM.  Always use your most recent med list.                   Brand Name Dispense Instructions for use Diagnosis    lisinopril 10 MG tablet    PRINIVIL/ZESTRIL    90  tablet    Take 1 tablet (10 mg) by mouth daily    Essential hypertension       * venlafaxine 37.5 MG 24 hr capsule    EFFEXOR-XR    90 capsule    Take with 75 mg for total of 112.5 mg once daily    Generalized anxiety disorder, Major depressive disorder, recurrent episode, moderate (H)       * venlafaxine 75 MG 24 hr capsule    EFFEXOR-XR    90 capsule    Take 1 capsule (75 mg) by mouth daily    Major depressive disorder, recurrent episode, moderate (H), Generalized anxiety disorder       * Notice:  This list has 2 medication(s) that are the same as other medications prescribed for you. Read the directions carefully, and ask your doctor or other care provider to review them with you.

## 2018-05-01 NOTE — NURSING NOTE
Chief Complaint   Patient presents with     Surgical Followup     Sling DOS 4/27/18       Initial /86 (BP Location: Left arm, Patient Position: Chair, Cuff Size: Adult Regular)  Pulse 75  Temp 98.2  F (36.8  C) (Oral)  Resp 12  Ht 1.524 m (5')  Wt 74.8 kg (165 lb)  LMP 04/27/2018  SpO2 97%  BMI 32.22 kg/m2 Estimated body mass index is 32.22 kg/(m^2) as calculated from the following:    Height as of this encounter: 1.524 m (5').    Weight as of this encounter: 74.8 kg (165 lb).  Medication Reconciliation: complete     Aimee Allen MA

## 2018-05-02 LAB
BACTERIA SPEC CULT: NORMAL
BACTERIA SPEC CULT: NORMAL
SPECIMEN SOURCE: NORMAL

## 2018-05-15 ENCOUNTER — OFFICE VISIT (OUTPATIENT)
Dept: FAMILY MEDICINE | Facility: CLINIC | Age: 38
End: 2018-05-15
Payer: COMMERCIAL

## 2018-05-15 VITALS
WEIGHT: 160.4 LBS | DIASTOLIC BLOOD PRESSURE: 78 MMHG | TEMPERATURE: 99.4 F | BODY MASS INDEX: 31.33 KG/M2 | SYSTOLIC BLOOD PRESSURE: 115 MMHG | HEART RATE: 86 BPM

## 2018-05-15 DIAGNOSIS — K52.9 ACUTE GASTROENTERITIS: Primary | ICD-10-CM

## 2018-05-15 PROCEDURE — 87209 SMEAR COMPLEX STAIN: CPT | Performed by: FAMILY MEDICINE

## 2018-05-15 PROCEDURE — 87506 IADNA-DNA/RNA PROBE TQ 6-11: CPT | Performed by: FAMILY MEDICINE

## 2018-05-15 PROCEDURE — 87177 OVA AND PARASITES SMEARS: CPT | Performed by: FAMILY MEDICINE

## 2018-05-15 PROCEDURE — 99213 OFFICE O/P EST LOW 20 MIN: CPT | Performed by: FAMILY MEDICINE

## 2018-05-15 NOTE — MR AVS SNAPSHOT
After Visit Summary   5/15/2018    Cecile Lim    MRN: [id number]           Patient Information     Date Of Birth          1980        Visit Information        Provider Department      5/15/2018 2:40 PM Ilya Alcantara MD [facility]        Today's Diagnoses     Acute gastroenteritis    -  1      Care Instructions      Still likely viral.    If not better after 48 hours, collect and turn in stool samples for testing.    Minimize use of loperamide.    Treating Diarrhea    Diarrhea happens when you have loose, watery, or frequent bowel movements. It is a common problem with many causes. Most cases of diarrhea clear up on their own. But certain cases may need treatment. Be sure to see your healthcare provider if your symptoms do not improve within a few days.  Getting relief  Treatment of diarrhea depends on its cause. Diarrhea caused by bacterial or parasite infection is often treated with antibiotics. Diarrhea caused by other factors, such as a stomach virus, often improves with simple home treatment. The tips below may also help relieve your symptoms.    Drink plenty of fluids. This helps prevent too much fluid loss (dehydration). Water, clear soups, and electrolyte solutions are good choices. Avoid alcohol, coffee, tea, and milk. These can irritate your intestines and make symptoms worse.    Suck on ice chips if drinking makes you queasy.    Return to your normal diet slowly. You may want to eat bland foods at first, such as rice and toast. Also, you may need to avoid certain foods for a while, such as dairy products. These can make symptoms worse. Ask your healthcare provider if there are any other foods you should avoid.    If you were prescribed antibiotics, take them as directed.    Do not take anti-diarrhea medicines without asking your healthcare provider first.  Call your healthcare provider   Call your healthcare provider if you have any of the  following:     A fever of 100.4 F (38.0 C) or higher, or as directed by your healthcare provider    Severe pain    Worsening diarrhea or diarrhea for more than 2 days    Bloody vomit or stool    Signs of dehydration (dizziness, dry mouth and tongue, rapid pulse, dark urine)  Date Last Reviewed: 7/1/2016 2000-2017 [employer]. [address] [place] [address]. All rights reserved. This information is not intended as a substitute for professional medical care. Always follow your healthcare professional's instructions.                Follow-ups after your visit        Follow-up notes from your care team     Return if symptoms worsen or fail to improve.      Future tests that were ordered for you today     Open Future Orders        Priority Expected Expires Ordered    Ova and Parasite Exam Routine Routine  5/15/2019 5/15/2018    Enteric Bacteria and Virus Panel by KEITH Stool Routine  5/15/2019 5/15/2018    Ova and Parasite Exam Routine Routine  5/15/2019 5/15/2018            Who to contact     If you have questions or need follow up information about today's clinic visit or your schedule please contact [facility] directly at [phone].  Normal or non-critical lab and imaging results will be communicated to you by [employer], letter or phone within 4 business days after the clinic has received the results. If you do not hear from us within 7 days, please contact the clinic through [employer] or phone. If you have a critical or abnormal lab result, we will notify you by phone as soon as possible.  Submit refill requests through [employer] or call your pharmacy and they will forward the refill request to us. Please allow 3 business days for your refill to be completed.          Additional Information About Your Visit        [employer] Information     [employer] gives you secure access to your electronic health record. If you see a primary care provider, you can also send messages to your care  team and make appointments. If you have questions, please call your primary care clinic.  If you do not have a primary care provider, please call [phone] and they will assist you.        Care EveryWhere ID     This is your Care EveryWhere ID. This could be used by other organizations to access your [place] medical records  [id number]        Your Vitals Were     Pulse Temperature Last Period BMI (Body Mass Index)          86 99.4  F (37.4  C) (Tympanic) 04/27/2018 31.33 kg/m2         Blood Pressure from Last 3 Encounters:   05/15/18 115/78   05/01/18 134/86   04/27/18 119/72    Weight from Last 3 Encounters:   05/15/18 160 lb 6.4 oz (72.8 kg)   05/01/18 165 lb (74.8 kg)   04/27/18 165 lb (74.8 kg)               Primary Care Provider Office Phone # Fax #    Fawn Talley,  [phone] [address] [facility] [address]        Equal Access to Services     CARYL JON : Hadii aad ku hadasho Soomaali, waaxda luqadaha, qaybta kaalmada adeegyada, krista oliveros . So [facility] [phone].    ATENCIÓN: Si habla español, tiene a everett disposición servicios gratuitos de asistencia lingüística. Llame al [phone].    We comply with applicable federal civil rights laws and [place] laws. We do not discriminate on the basis of race, color, national origin, age, disability, sex, sexual orientation, or gender identity.            Thank you!     Thank you for choosing [facility]  for your care. Our goal is always to provide you with excellent care. Hearing back from our patients is one way we can continue to improve our services. Please take a few minutes to complete the written survey that you may receive in the mail after your visit with us. Thank you!             Your Updated Medication List - Protect others around you: Learn how to safely use, store and throw away your medicines at www.disposemymeds.org.          This list is accurate as of 5/15/18   3:07 PM.  Always use your most recent med list.                   Brand Name Dispense Instructions for use Diagnosis    lisinopril 10 MG tablet    PRINIVIL/ZESTRIL    90 tablet    Take 1 tablet (10 mg) by mouth daily    Essential hypertension       * venlafaxine 37.5 MG 24 hr capsule    EFFEXOR-XR    90 capsule    Take with 75 mg for total of 112.5 mg once daily    Generalized anxiety disorder, Major depressive disorder, recurrent episode, moderate (H)       * venlafaxine 75 MG 24 hr capsule    EFFEXOR-XR    90 capsule    Take 1 capsule (75 mg) by mouth daily    Major depressive disorder, recurrent episode, moderate (H), Generalized anxiety disorder       * Notice:  This list has 2 medication(s) that are the same as other medications prescribed for you. Read the directions carefully, and ask your doctor or other care provider to review them with you.

## 2018-05-15 NOTE — NURSING NOTE
Initial /78  Pulse 86  Temp 99.4  F (37.4  C) (Tympanic)  Wt 160 lb 6.4 oz (72.8 kg)  LMP 04/27/2018  BMI 31.33 kg/m2 Estimated body mass index is 31.33 kg/(m^2) as calculated from the following:    Height as of 5/1/18: 5' (1.524 m).    Weight as of this encounter: 160 lb 6.4 oz (72.8 kg). .

## 2018-05-15 NOTE — PATIENT INSTRUCTIONS
Still likely viral.    If not better after 48 hours, collect and turn in stool samples for testing.    Minimize use of loperamide.    Treating Diarrhea    Diarrhea happens when you have loose, watery, or frequent bowel movements. It is a common problem with many causes. Most cases of diarrhea clear up on their own. But certain cases may need treatment. Be sure to see your healthcare provider if your symptoms do not improve within a few days.  Getting relief  Treatment of diarrhea depends on its cause. Diarrhea caused by bacterial or parasite infection is often treated with antibiotics. Diarrhea caused by other factors, such as a stomach virus, often improves with simple home treatment. The tips below may also help relieve your symptoms.    Drink plenty of fluids. This helps prevent too much fluid loss (dehydration). Water, clear soups, and electrolyte solutions are good choices. Avoid alcohol, coffee, tea, and milk. These can irritate your intestines and make symptoms worse.    Suck on ice chips if drinking makes you queasy.    Return to your normal diet slowly. You may want to eat bland foods at first, such as rice and toast. Also, you may need to avoid certain foods for a while, such as dairy products. These can make symptoms worse. Ask your healthcare provider if there are any other foods you should avoid.    If you were prescribed antibiotics, take them as directed.    Do not take anti-diarrhea medicines without asking your healthcare provider first.  Call your healthcare provider   Call your healthcare provider if you have any of the following:     A fever of 100.4 F (38.0 C) or higher, or as directed by your healthcare provider    Severe pain    Worsening diarrhea or diarrhea for more than 2 days    Bloody vomit or stool    Signs of dehydration (dizziness, dry mouth and tongue, rapid pulse, dark urine)  Date Last Reviewed: 7/1/2016 2000-2017 [employer]. [address] [facility], [place]  [address]. All rights reserved. This information is not intended as a substitute for professional medical care. Always follow your healthcare professional's instructions.

## 2018-05-15 NOTE — PROGRESS NOTES
SUBJECTIVE:   Cecile Lmi is a 37 year old female who presents to clinic today for the following health issues:      Chief Complaint   Patient presents with     Fever     Symptoms since Sunday. Loose stools (3 loose stools today), nausea, low grade fever (101.4), body aches, chills. Has tried Tylenol for fever and Immodium for loose stools. States no other treatment. States that she did have some lettuce the day before symptoms started, concerned for Ecoli.         Diarrhea      Duration: 3 days    Description:       Consistency of stool: runny and explosive       Blood in stool: no        Number of loose stools past 24 hours: three today but none yesterday    Intensity:  moderate    Accompanying signs and symptoms:       Fever: YES- 100.4 F Tmax at home       Nausea/vomitting: YES- nausea only       Abdominal pain: YES- crampy, gurgly       Weight loss: no     History (recent antibiotics or travel/ill contacts/med changes/testing done): see above; no recent travel; no other sick contacts per patient.    Precipitating or alleviating factors:  No triggers; patient had less BM since Imodium was used.    Therapies tried and outcome: Imodium AD      Problem list and histories reviewed & adjusted, as indicated.  Additional history: as documented    Patient Active Problem List   Diagnosis     Ureteral stone     Generalized anxiety disorder     CARDIOVASCULAR SCREENING; LDL GOAL LESS THAN 160     Stress incontinence, female     Essential hypertension     Major depressive disorder, recurrent episode, in partial remission (HCC)     Lipoma of back     Encounter for surveillance of contraceptives     Past Surgical History:   Procedure Laterality Date     NO HISTORY OF SURGERY  1/2007     SLING TRANSVAGINAL N/A 4/27/2018    Procedure: SLING TRANSVAGINAL;  Pubovaginal sling ([facility]);  Surgeon: Douglas Hopkins MD;  Location: [facility] OR       Social History   Substance Use Topics     Smoking status: Never Smoker      Smokeless tobacco: Never Used     Alcohol use 0.0 oz/week     0 Standard drinks or equivalent per week      Comment: Rare. Maybe one drink a month     Family History   Problem Relation Age of Onset     Hypertension Mother      Musculoskeletal Disorder Mother      degenerative disc disease     Thyroid Disease Mother      Depression Mother      CEREBROVASCULAR DISEASE Mother      Hypertension Father      CEREBROVASCULAR DISEASE Father      age 40     Hypertension Maternal Grandfather      Cardiovascular Maternal Grandfather      CANCER Paternal Grandfather      lung     Asthma No family hx of      C.A.D. No family hx of      DIABETES No family hx of      Breast Cancer No family hx of      Cancer - colorectal No family hx of      Prostate Cancer No family hx of          Current Outpatient Prescriptions   Medication Sig Dispense Refill     lisinopril (PRINIVIL/ZESTRIL) 10 MG tablet Take 1 tablet (10 mg) by mouth daily 90 tablet 3     venlafaxine (EFFEXOR-XR) 37.5 MG 24 hr capsule Take with 75 mg for total of 112.5 mg once daily 90 capsule 3     venlafaxine (EFFEXOR-XR) 75 MG 24 hr capsule Take 1 capsule (75 mg) by mouth daily 90 capsule 3     No Known Allergies    Reviewed and updated as needed this visit by clinical staff  Tobacco  Allergies  Meds  Problems  Med Hx  Surg Hx  Fam Hx  Soc Hx        Reviewed and updated as needed this visit by Provider  Allergies  Meds  Problems         ROS:  C: NEGATIVE for fever, chills, change in weight  I: NEGATIVE for jaundice/rash  E: NEGATIVE for jaundice  R: NEGATIVE for significant cough or SOB  CV: NEGATIVE for chest pain, palpitations or peripheral edema  GI: see above   male :see above  M: NEGATIVE for significant arthralgias or myalgia  H: NEGATIVE for bleeding problems}    OBJECTIVE:                                                    /78  Pulse 86  Temp 99.4  F (37.4  C) (Tympanic)  Wt 160 lb 6.4 oz (72.8 kg)  LMP 04/27/2018  BMI 31.33 kg/m2  Body mass  index is 31.33 kg/(m^2).  GENERAL:  alert and no distress  MOUTH: moist mucosae  NECK: no tenderness, no adenopathy,  Thyroid not enlarged  RESP: lungs clear to auscultation - no rales, no rhonchi, no wheezes  CV: regular rates and rhythm, normal S1 S2, no S3 or S4 and no murmur, no click or rub  ABD: rounded abdomen, no skin changes, diffuse mild TTP, no mass, no guarding, no Engel's sign  MS: extremities- no gross deformities noted, no edema  SKIN: no jaundice or rash      Diagnostic test results:  Diagnostic Test Results:  No results found for this or any previous visit (from the past 24 hour(s)).     ASSESSMENT/PLAN:                                                        ICD-10-CM    1. Acute gastroenteritis K52.9 Enteric Bacteria and Virus Panel by KEITH Stool     Ova and Parasite Exam Routine     Ova and Parasite Exam Routine     Duration, symptoms and benign exam today still support likely viral AGE.  With one risk factor for E coli O157, patient was advised of treatment of it if it is present: pushing oral fluids, no antibiotics due to risk for HUS.  Patient concurred to do stool testing if no improvement after 48 hours.  Loperamide use and precautions discussed with patient.  Reinforced thorough general hand washing.  Return precautions discussed and given to patient.      Follow up with Provider - 2-3 days if worsened symptoms or if febrile.   Patient Instructions     Still likely viral.    If not better after 48 hours, collect and turn in stool samples for testing.    Minimize use of loperamide.    Treating Diarrhea    Diarrhea happens when you have loose, watery, or frequent bowel movements. It is a common problem with many causes. Most cases of diarrhea clear up on their own. But certain cases may need treatment. Be sure to see your healthcare provider if your symptoms do not improve within a few days.  Getting relief  Treatment of diarrhea depends on its cause. Diarrhea caused by bacterial or parasite  infection is often treated with antibiotics. Diarrhea caused by other factors, such as a stomach virus, often improves with simple home treatment. The tips below may also help relieve your symptoms.    Drink plenty of fluids. This helps prevent too much fluid loss (dehydration). Water, clear soups, and electrolyte solutions are good choices. Avoid alcohol, coffee, tea, and milk. These can irritate your intestines and make symptoms worse.    Suck on ice chips if drinking makes you queasy.    Return to your normal diet slowly. You may want to eat bland foods at first, such as rice and toast. Also, you may need to avoid certain foods for a while, such as dairy products. These can make symptoms worse. Ask your healthcare provider if there are any other foods you should avoid.    If you were prescribed antibiotics, take them as directed.    Do not take anti-diarrhea medicines without asking your healthcare provider first.  Call your healthcare provider   Call your healthcare provider if you have any of the following:     A fever of 100.4 F (38.0 C) or higher, or as directed by your healthcare provider    Severe pain    Worsening diarrhea or diarrhea for more than 2 days    Bloody vomit or stool    Signs of dehydration (dizziness, dry mouth and tongue, rapid pulse, dark urine)  Date Last Reviewed: 7/1/2016 2000-2017 [employer]. [address] [place] [address], [place] [address]. All rights reserved. This information is not intended as a substitute for professional medical care. Always follow your healthcare professional's instructions.            Ilya Alcantara MD  [facility]

## 2018-05-16 DIAGNOSIS — K52.9 ACUTE GASTROENTERITIS: ICD-10-CM

## 2018-05-16 PROCEDURE — 87177 OVA AND PARASITES SMEARS: CPT | Performed by: FAMILY MEDICINE

## 2018-05-16 PROCEDURE — 87209 SMEAR COMPLEX STAIN: CPT | Performed by: FAMILY MEDICINE

## 2018-05-17 DIAGNOSIS — K52.9 ACUTE GASTROENTERITIS: ICD-10-CM

## 2018-05-17 LAB
C COLI+JEJUNI+LARI FUSA STL QL NAA+PROBE: NOT DETECTED
EC STX1 GENE STL QL NAA+PROBE: NOT DETECTED
EC STX2 GENE STL QL NAA+PROBE: NOT DETECTED
ENTERIC PATHOGEN COMMENT: ABNORMAL
NOROV GI+II ORF1-ORF2 JNC STL QL NAA+PR: NOT DETECTED
O+P STL MICRO: NORMAL
O+P STL MICRO: NORMAL
RVA NSP5 STL QL NAA+PROBE: ABNORMAL
SALMONELLA SP RPOD STL QL NAA+PROBE: NOT DETECTED
SHIGELLA SP+EIEC IPAH STL QL NAA+PROBE: NOT DETECTED
SPECIMEN SOURCE: NORMAL
V CHOL+PARA RFBL+TRKH+TNAA STL QL NAA+PR: NOT DETECTED
Y ENTERO RECN STL QL NAA+PROBE: NOT DETECTED

## 2018-05-18 LAB
O+P STL MICRO: NORMAL
O+P STL MICRO: NORMAL
SPECIMEN SOURCE: NORMAL

## 2018-06-19 ENCOUNTER — OFFICE VISIT (OUTPATIENT)
Dept: UROLOGY | Facility: CLINIC | Age: 38
End: 2018-06-19
Payer: COMMERCIAL

## 2018-06-19 VITALS
BODY MASS INDEX: 31.41 KG/M2 | HEART RATE: 65 BPM | HEIGHT: 60 IN | OXYGEN SATURATION: 97 % | RESPIRATION RATE: 12 BRPM | DIASTOLIC BLOOD PRESSURE: 93 MMHG | SYSTOLIC BLOOD PRESSURE: 136 MMHG | WEIGHT: 160 LBS

## 2018-06-19 DIAGNOSIS — N39.3 STRESS INCONTINENCE, FEMALE: Primary | ICD-10-CM

## 2018-06-19 LAB
ALBUMIN UR-MCNC: NEGATIVE MG/DL
APPEARANCE UR: CLEAR
BILIRUB UR QL STRIP: NEGATIVE
COLOR UR AUTO: YELLOW
GLUCOSE UR STRIP-MCNC: NEGATIVE MG/DL
HGB UR QL STRIP: ABNORMAL
KETONES UR STRIP-MCNC: NEGATIVE MG/DL
LEUKOCYTE ESTERASE UR QL STRIP: NEGATIVE
NITRATE UR QL: NEGATIVE
PH UR STRIP: 6 PH (ref 5–7)
RBC #/AREA URNS AUTO: ABNORMAL /HPF
SOURCE: ABNORMAL
SP GR UR STRIP: 1.02 (ref 1–1.03)
UROBILINOGEN UR STRIP-ACNC: 0.2 EU/DL (ref 0.2–1)
WBC #/AREA URNS AUTO: ABNORMAL /HPF

## 2018-06-19 PROCEDURE — 81001 URINALYSIS AUTO W/SCOPE: CPT | Performed by: UROLOGY

## 2018-06-19 PROCEDURE — 99024 POSTOP FOLLOW-UP VISIT: CPT | Performed by: UROLOGY

## 2018-06-19 NOTE — PROGRESS NOTES
S/p Altis sling 4/27/18    Reports episode of pink vag d/c after intercourse last nite. Otherwise without vag bleeding. Also reports resolution of MARYELLEN and need for pads; very pleased about this.    Denies dysuria, gross hematuria, frequency.      PE: Excellent support, scant heme, trace suture, no palpable mesh, nontender        Results for orders placed or performed in visit on 06/19/18   UA reflex to Microscopic and Culture [[id number]]   Result Value Ref Range    Color Urine Yellow     Appearance Urine Clear     Glucose Urine Negative NEG^Negative mg/dL    Bilirubin Urine Negative NEG^Negative    Ketones Urine Negative NEG^Negative mg/dL    Specific Gravity Urine 1.025 1.003 - 1.035    Blood Urine Large (A) NEG^Negative    pH Urine 6.0 5.0 - 7.0 pH    Protein Albumin Urine Negative NEG^Negative mg/dL    Urobilinogen Urine 0.2 0.2 - 1.0 EU/dL    Nitrite Urine Negative NEG^Negative    Leukocyte Esterase Urine Negative NEG^Negative    Source Midstream Urine    Urine Microscopic   Result Value Ref Range    WBC Urine 0 - 5 OTO5^0 - 5 /HPF    RBC Urine 25-50 (A) OTO2^O - 2 /HPF       IMP:  1. Resolution of MARYELLEN with sling  2. Vag spotting after intimacy      PLAN:  1. Allow for a bit more healing prior to intimacy  2. RTC only PRN  3. Copy

## 2018-06-19 NOTE — MR AVS SNAPSHOT
After Visit Summary   6/19/2018    Cecile Lim    MRN: [id number]           Patient Information     Date Of Birth          1980        Visit Information        Provider Department      6/19/2018 12:30 PM Douglas Hopkins MD [facility]        Today's Diagnoses     Stress incontinence, female    -  1      Care Instructions    If you have questions or concerns on any instructions given to you by your provider today or if you need to schedule an appointment, you can reach us at [phone].                         Follow-ups after your visit        Follow-up notes from your care team     Return if symptoms worsen or fail to improve.      Who to contact     If you have questions or need follow up information about today's clinic visit or your schedule please contact [facility] directly at [phone].  Normal or non-critical lab and imaging results will be communicated to you by MyChart, letter or phone within 4 business days after the clinic has received the results. If you do not hear from us within 7 days, please contact the clinic through MyChart or phone. If you have a critical or abnormal lab result, we will notify you by phone as soon as possible.  Submit refill requests through [employer] or call your pharmacy and they will forward the refill request to us. Please allow 3 business days for your refill to be completed.          Additional Information About Your Visit        MyChart Information     [employer] gives you secure access to your electronic health record. If you see a primary care provider, you can also send messages to your care team and make appointments. If you have questions, please call your primary care clinic.  If you do not have a primary care provider, please call [phone] and they will assist you.        Care EveryWhere ID     This is your Care EveryWhere ID. This could be used by other organizations to access your [facility]  records  [id number]        Your Vitals Were     Pulse Respirations Height Pulse Oximetry BMI (Body Mass Index)       65 12 1.524 m (5') 97% 31.25 kg/m2        Blood Pressure from Last 3 Encounters:   06/19/18 (!) 136/93   05/15/18 115/78   05/01/18 134/86    Weight from Last 3 Encounters:   06/19/18 72.6 kg (160 lb)   05/15/18 72.8 kg (160 lb 6.4 oz)   05/01/18 74.8 kg (165 lb)              We Performed the Following     UA reflex to Microscopic and Culture [[id number]]     Urine Microscopic        Primary Care Provider Office Phone # Fax #    Fawn Talley,  [phone] [address] [facility] [address]        Equal Access to Services     CARYL JON : Hadii aad ku hadasho Sojuan, waaxda luqadaha, qaybta kaalmada adeegyada, krista oliveros . So [facility] [phone].    ATENCIÓN: Si habla español, tiene a everett disposición servicios gratuitos de asistencia lingüística. Laurent al [phone].    We comply with applicable federal civil rights laws and [place] laws. We do not discriminate on the basis of race, color, national origin, age, disability, sex, sexual orientation, or gender identity.            Thank you!     Thank you for choosing [facility]  for your care. Our goal is always to provide you with excellent care. Hearing back from our patients is one way we can continue to improve our services. Please take a few minutes to complete the written survey that you may receive in the mail after your visit with us. Thank you!             Your Updated Medication List - Protect others around you: Learn how to safely use, store and throw away your medicines at www.disposemymeds.org.          This list is accurate as of 6/19/18  1:13 PM.  Always use your most recent med list.                   Brand Name Dispense Instructions for use Diagnosis    lisinopril 10 MG tablet    PRINIVIL/ZESTRIL    90 tablet    Take 1 tablet (10 mg) by mouth daily     Essential hypertension       * venlafaxine 37.5 MG 24 hr capsule    EFFEXOR-XR    90 capsule    Take with 75 mg for total of 112.5 mg once daily    Generalized anxiety disorder, Major depressive disorder, recurrent episode, moderate (H)       * venlafaxine 75 MG 24 hr capsule    EFFEXOR-XR    90 capsule    Take 1 capsule (75 mg) by mouth daily    Major depressive disorder, recurrent episode, moderate (H), Generalized anxiety disorder       * Notice:  This list has 2 medication(s) that are the same as other medications prescribed for you. Read the directions carefully, and ask your doctor or other care provider to review them with you.

## 2018-06-19 NOTE — NURSING NOTE
Chief Complaint   Patient presents with     Surgical Followup     Sling DOS 4/27/18       Initial BP (!) 136/93 (BP Location: Right arm, Patient Position: Chair, Cuff Size: Adult Regular)  Pulse 65  Resp 12  Ht 1.524 m (5')  Wt 72.6 kg (160 lb)  SpO2 97%  BMI 31.25 kg/m2 Estimated body mass index is 31.25 kg/(m^2) as calculated from the following:    Height as of this encounter: 1.524 m (5').    Weight as of this encounter: 72.6 kg (160 lb).  BP completed using cuff size: regular  Medications and allergies reviewed.      Aimee LAI MA

## 2018-06-19 NOTE — PATIENT INSTRUCTIONS
If you have questions or concerns on any instructions given to you by your provider today or if you need to schedule an appointment, you can reach us at [phone].

## 2018-09-06 ENCOUNTER — OFFICE VISIT (OUTPATIENT)
Dept: SLEEP MEDICINE | Facility: CLINIC | Age: 38
End: 2018-09-06
Attending: FAMILY MEDICINE
Payer: COMMERCIAL

## 2018-09-06 VITALS
WEIGHT: 159.4 LBS | SYSTOLIC BLOOD PRESSURE: 122 MMHG | BODY MASS INDEX: 31.29 KG/M2 | DIASTOLIC BLOOD PRESSURE: 70 MMHG | HEIGHT: 60 IN | OXYGEN SATURATION: 100 % | HEART RATE: 63 BPM

## 2018-09-06 DIAGNOSIS — E66.09 CLASS 1 OBESITY DUE TO EXCESS CALORIES WITHOUT SERIOUS COMORBIDITY WITH BODY MASS INDEX (BMI) OF 31.0 TO 31.9 IN ADULT: ICD-10-CM

## 2018-09-06 DIAGNOSIS — R06.83 SNORING: Primary | ICD-10-CM

## 2018-09-06 DIAGNOSIS — E66.811 CLASS 1 OBESITY DUE TO EXCESS CALORIES WITHOUT SERIOUS COMORBIDITY WITH BODY MASS INDEX (BMI) OF 31.0 TO 31.9 IN ADULT: ICD-10-CM

## 2018-09-06 DIAGNOSIS — G47.19 EXCESSIVE DAYTIME SLEEPINESS: ICD-10-CM

## 2018-09-06 DIAGNOSIS — R06.81 APNEA: ICD-10-CM

## 2018-09-06 PROCEDURE — 99244 OFF/OP CNSLTJ NEW/EST MOD 40: CPT | Performed by: FAMILY MEDICINE

## 2018-09-06 NOTE — MR AVS SNAPSHOT
After Visit Summary   9/6/2018    Cecile Lim    MRN: [id number]           Patient Information     Date Of Birth          1980        Visit Information        Provider Department      9/6/2018 10:00 AM Patrick Beach MD [facility]        Today's Diagnoses     Snoring    -  1    Excessive daytime sleepiness        Apnea        Class 1 obesity due to excess calories without serious comorbidity with body mass index (BMI) of 31.0 to 31.9 in adult           Follow-ups after your visit        Follow-up notes from your care team     Return if symptoms worsen or fail to improve.      Your next 10 appointments already scheduled     Sep 12, 2018  8:00 PM CDT   PSG Split with SLEEP LAB, BED TWO   [facility] ([facility])    [address] [address]   [phone]            Sep 13, 2018  7:30 AM CDT   Return Sleep Patient with Patrick Beach MD   [facility] ([facility])    [address]  [facility] [address]   [phone]              Future tests that were ordered for you today     Open Future Orders        Priority Expected Expires Ordered    Comprehensive Sleep Study Routine  3/5/2019 9/6/2018            Who to contact     If you have questions or need follow up information about today's clinic visit or your schedule please contact [facility] directly at [phone].  Normal or non-critical lab and imaging results will be communicated to you by MyChart, letter or phone within 4 business days after the clinic has received the results. If you do not hear from us within 7 days, please contact the clinic through MyChart or phone. If you have a critical or abnormal lab result, we will notify you by phone as soon as possible.  Submit refill requests through [facility] or call your pharmacy and they will forward the refill request to us.  Please allow 3 business days for your refill to be completed.          Additional Information About Your Visit        MyChart Information     [employer] gives you secure access to your electronic health record. If you see a primary care provider, you can also send messages to your care team and make appointments. If you have questions, please call your primary care clinic.  If you do not have a primary care provider, please call [phone] and they will assist you.        Care EveryWhere ID     This is your Care EveryWhere ID. This could be used by other organizations to access your [facility] medical records  [id number]        Your Vitals Were     Pulse Height Pulse Oximetry BMI (Body Mass Index)          63 1.524 m (5') 100% 31.13 kg/m2         Blood Pressure from Last 3 Encounters:   09/06/18 122/70   06/19/18 (!) 136/93   05/15/18 115/78    Weight from Last 3 Encounters:   09/06/18 72.3 kg (159 lb 6.4 oz)   06/19/18 72.6 kg (160 lb)   05/15/18 72.8 kg (160 lb 6.4 oz)              We Performed the Following     SLEEP EVALUATION & MANAGEMENT REFERRAL - ADULT -[facility]  [phone] (Age 2 and up)        Primary Care Provider Office Phone # Fax #    Fawn Henderson DO [place] [phone] [address] [facility] [address]        Equal Access to Services     CARYL JON AH: Hadii delmy khan hadasho Solakeshiaali, waaxda luqadaha, qaybta kaalmada aderoryyada, krista oliveros . So [facility] [phone].    ATENCIÓN: Si habla español, tiene a everett disposición servicios gratuitos de asistencia lingüística. Llame al [phone].    We comply with applicable federal civil rights laws and [place] laws. We do not discriminate on the basis of race, color, national origin, age, disability, sex, sexual orientation, or gender identity.            Thank you!     Thank you for choosing [facility]  for your care. Our goal is always to provide you with excellent  care. Hearing back from our patients is one way we can continue to improve our services. Please take a few minutes to complete the written survey that you may receive in the mail after your visit with us. Thank you!             Your Updated Medication List - Protect others around you: Learn how to safely use, store and throw away your medicines at www.disposemymeds.org.          This list is accurate as of 9/6/18 12:55 PM.  Always use your most recent med list.                   Brand Name Dispense Instructions for use Diagnosis    lisinopril 10 MG tablet    PRINIVIL/ZESTRIL    90 tablet    Take 1 tablet (10 mg) by mouth daily    Essential hypertension       * venlafaxine 37.5 MG 24 hr capsule    EFFEXOR-XR    90 capsule    Take with 75 mg for total of 112.5 mg once daily    Generalized anxiety disorder, Major depressive disorder, recurrent episode, moderate (H)       * venlafaxine 75 MG 24 hr capsule    EFFEXOR-XR    90 capsule    Take 1 capsule (75 mg) by mouth daily    Major depressive disorder, recurrent episode, moderate (H), Generalized anxiety disorder       * Notice:  This list has 2 medication(s) that are the same as other medications prescribed for you. Read the directions carefully, and ask your doctor or other care provider to review them with you.

## 2018-09-06 NOTE — PROGRESS NOTES
"  Sleep Consultation:    Date on this visit: 9/6/2018    Cecile Lim is sent by Leonidas Dockery for a sleep consultation regarding daytime fatigue, very loud snoring.    Primary Physician: Fawn Talley     Chief complaint:  \"I seem to need a lot of sleep and my snoring is incredibly loud.\"    HPI:  Cecile Lim is a pleasant 36 yo F with history of HTN, obesity, LAURA, MDD who presents for a sleep evaluation.  She is alone for this visit today.    Concerns raised in regards to sleep ~1 year ago given concerns of daytime fatigue and a longer sleep need.  In retrospect, she thinks she has probably needed more sleep for a number of years, and may not necessarily be new.  She became more motivated to seek evaluation now given her snoring is incredibly loud and bothersome, as well as hearing from co-workers on how much better they feel after treating for sleep apnea.    She denies any observed apnea.  Denies AM headaches.  Does awake herself with choking / gasping.      She also notes seeming to need a lot of sleep, I tried to pin down if this is new or long-standing and appears long-standing.  Currently, can seem to sleep on average 10+ hours per day if allowed.    On work days, in bed ~8pm and falls asleep quickly.  Seems to deny frequent or prolonged awakenings.  Up at 6am by alarm.  On weekends, similar pattern, but can take a 1+ hour nap if allowed.    She also seems to deny any inappropriate daytime sleepiness and fatigue is variable.  Mood stable.    Denies any sleep walking, sleep talking, dream enactment.  She has been told by her  that she appears to balance her hand on her forehead consistently when sleeping supine and she has minimal awareness of this behavior.    Patient's [place] Sleepiness score 6/24 consistent with no daytime sleepiness.      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  All daytime " hours.    Allergies:    No Known Allergies    Medications:    Current Outpatient Prescriptions   Medication Sig Dispense Refill     lisinopril (PRINIVIL/ZESTRIL) 10 MG tablet Take 1 tablet (10 mg) by mouth daily 90 tablet 3     venlafaxine (EFFEXOR-XR) 37.5 MG 24 hr capsule Take with 75 mg for total of 112.5 mg once daily 90 capsule 3     venlafaxine (EFFEXOR-XR) 75 MG 24 hr capsule Take 1 capsule (75 mg) by mouth daily 90 capsule 3       Problem List:  Patient Active Problem List    Diagnosis Date Noted     Encounter for surveillance of contraceptives 03/10/2016     Priority: Medium      has had vasectomy       Lipoma of back 11/12/2015     Priority: Medium     Major depressive disorder, recurrent episode, in partial remission (HCC) 12/23/2014     Priority: Medium     Essential hypertension 09/18/2012     Priority: Medium     Stress incontinence, female 02/27/2012     Priority: Medium     CARDIOVASCULAR SCREENING; LDL GOAL LESS THAN 160 10/31/2010     Priority: Medium     Generalized anxiety disorder 10/20/2010     Priority: Medium     Diagnosis updated by automated process. Provider to review and confirm.       Ureteral stone 07/09/2009     Priority: Medium        Past Medical/Surgical History:  Past Medical History:   Diagnosis Date     LAURA (generalised anxiety disorder)      HTN (hypertension)      Suicide attempt 11/2014     Past Surgical History:   Procedure Laterality Date     NO HISTORY OF SURGERY  1/2007     SLING TRANSVAGINAL N/A 4/27/2018    Procedure: SLING TRANSVAGINAL;  Pubovaginal sling ([place]);  Surgeon: Douglas Hopkins MD;  Location: [facility] OR       Social History:  Social History     Social History     Marital status:      Spouse name: Kenn     Number of children: 0     Years of education: N/A     Occupational History     Nurse [employer]     Social History Main Topics     Smoking status: Never Smoker     Smokeless tobacco: Never Used     Alcohol use 0.0 oz/week     0  Standard drinks or equivalent per week      Comment: Rare. Maybe one drink a month     Drug use: No     Sexual activity: Yes     Partners: Male     Birth control/ protection: Condom     Other Topics Concern      Service No     Blood Transfusions No     Caffeine Concern No     Occupational Exposure No     Hobby Hazards No     Sleep Concern No     Stress Concern No     Weight Concern No     Special Diet No     Back Care No     Exercise Yes     runner,walk     Bike Helmet No     recommended     Seat Belt Yes     Self-Exams No     Parent/Sibling W/ Cabg, Mi Or Angioplasty Before 65f 55m? Yes     maternal grandfather     Social History Narrative    .    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         Family History:  Family History   Problem Relation Age of Onset     Hypertension Mother      Musculoskeletal Disorder Mother      degenerative disc disease     Thyroid Disease Mother      Depression Mother      Cerebrovascular Disease Mother      Hypertension Father      Cerebrovascular Disease Father      age 40     Hypertension Maternal Grandfather      Cardiovascular Maternal Grandfather      Cancer Paternal Grandfather      lung     Asthma No family hx of      C.A.D. No family hx of      Diabetes No family hx of      Breast Cancer No family hx of      Cancer - colorectal No family hx of      Prostate Cancer No family hx of        Review of Systems:  A complete review of systems reviewed by me is negative with the exeption of what has been mentioned in the history of present illness.      Physical Examination:  Vitals: /70  Pulse 63  Ht 1.524 m (5')  Wt 72.3 kg (159 lb 6.4 oz)  SpO2 100%  BMI 31.13 kg/m2  BMI= Body mass index is 31.13 kg/(m^2).         [place] Total Score 9/6/2018   Total score - [place] 6            GENERAL APPEARANCE: healthy, alert, no distress and over weight  RESP: lungs clear to auscultation - no rales, rhonchi or wheezes  CV: regular rates and rhythm, normal S1 S2, no S3 or S4  and no murmur, click or rub  PSYCH: mentation appears normal and affect normal/bright    Impression/Plan:    Cecile Lim is a pleasant 36 yo F with history of HTN, obesity, LAURA, MDD who presents for a sleep evaluation given very loud snoring, REGINO risk factors and long sleep need.    1.)  High probability of REGINO.    2.)  Possible long sleep need   - STOP-BANG score of 3.   - Will schedule sleep study to fully evaluate for REGINO and possible treatment.  Will see patient after the study.   - We also discussed that she may also have a relatively longer sleep need    Plan as given to patient as above.    I have spent 60 minutes with this patient today in which greater than 50% of this time was spent in the counseling / coordination of care regarding REGINO.    Polysomnography reviewed.  Obstructive sleep apnea reviewed.  Complications of untreated sleep apnea were reviewed.    Patrick Beach MD    CC: Leonidas Dockery

## 2018-09-12 ENCOUNTER — THERAPY VISIT (OUTPATIENT)
Dept: SLEEP MEDICINE | Facility: CLINIC | Age: 38
End: 2018-09-12
Payer: COMMERCIAL

## 2018-09-12 DIAGNOSIS — R06.83 SNORING: ICD-10-CM

## 2018-09-12 DIAGNOSIS — E66.811 CLASS 1 OBESITY DUE TO EXCESS CALORIES WITHOUT SERIOUS COMORBIDITY WITH BODY MASS INDEX (BMI) OF 31.0 TO 31.9 IN ADULT: ICD-10-CM

## 2018-09-12 DIAGNOSIS — E66.09 CLASS 1 OBESITY DUE TO EXCESS CALORIES WITHOUT SERIOUS COMORBIDITY WITH BODY MASS INDEX (BMI) OF 31.0 TO 31.9 IN ADULT: ICD-10-CM

## 2018-09-12 DIAGNOSIS — R06.81 APNEA: ICD-10-CM

## 2018-09-12 DIAGNOSIS — G47.19 EXCESSIVE DAYTIME SLEEPINESS: ICD-10-CM

## 2018-09-12 PROCEDURE — 95810 POLYSOM 6/> YRS 4/> PARAM: CPT | Performed by: FAMILY MEDICINE

## 2018-09-12 NOTE — MR AVS SNAPSHOT
After Visit Summary   9/12/2018    Cecile Lim    MRN: [id number]           Patient Information     Date Of Birth          1980        Visit Information        Provider Department      9/12/2018 8:00 PM SLEEP LAB, BED TWO [facility]        Today's Diagnoses     Excessive daytime sleepiness        Class 1 obesity due to excess calories without serious comorbidity with body mass index (BMI) of 31.0 to 31.9 in adult        Apnea        Snoring           Follow-ups after your visit        Your next 10 appointments already scheduled     Sep 13, 2018  7:30 AM CDT   Return Sleep Patient with Patrick Beach MD   [facility] ([facility])    [address]  [facility] [address] [phone]              Who to contact     If you have questions or need follow up information about today's clinic visit or your schedule please contact [facility] directly at [phone].  Normal or non-critical lab and imaging results will be communicated to you by [employer], letter or phone within 4 business days after the clinic has received the results. If you do not hear from us within 7 days, please contact the clinic through [employer] (E[employer])t or phone. If you have a critical or abnormal lab result, we will notify you by phone as soon as possible.  Submit refill requests through [employer] or call your pharmacy and they will forward the refill request to us. Please allow 3 business days for your refill to be completed.          Additional Information About Your Visit        MyChart Information     [employer] gives you secure access to your electronic health record. If you see a primary care provider, you can also send messages to your care team and make appointments. If you have questions, please call your primary care clinic.  If you do not have a primary care provider, please call [phone] and they will assist you.        Care  EveryWhere ID     This is your Care EveryWhere ID. This could be used by other organizations to access your [facility] medical records  [id number]         Blood Pressure from Last 3 Encounters:   09/06/18 122/70   06/19/18 (!) 136/93   05/15/18 115/78    Weight from Last 3 Encounters:   09/06/18 72.3 kg (159 lb 6.4 oz)   06/19/18 72.6 kg (160 lb)   05/15/18 72.8 kg (160 lb 6.4 oz)              We Performed the Following     Comprehensive Sleep Study        Primary Care Provider Office Phone # Fax #    Fawn Talley, [phone] [address] [facility] [address]        Equal Access to Services     CARYL JON : Shala boyleo Solakeshiaali, waaxda luqadaha, qaybta kaalmada adeegyada, krista oliveros . So [facility] [phone].    ATENCIÓN: Si habla español, tiene a everett disposición servicios gratuitos de asistencia lingüística. Llame al [phone].    We comply with applicable federal civil rights laws and [place] laws. We do not discriminate on the basis of race, color, national origin, age, disability, sex, sexual orientation, or gender identity.            Thank you!     Thank you for choosing [facility]  for your care. Our goal is always to provide you with excellent care. Hearing back from our patients is one way we can continue to improve our services. Please take a few minutes to complete the written survey that you may receive in the mail after your visit with us. Thank you!             Your Updated Medication List - Protect others around you: Learn how to safely use, store and throw away your medicines at www.disposemymeds.org.          This list is accurate as of 9/12/18 11:59 PM.  Always use your most recent med list.                   Brand Name Dispense Instructions for use Diagnosis    lisinopril 10 MG tablet    PRINIVIL/ZESTRIL    90 tablet    Take 1 tablet (10 mg) by mouth daily    Essential hypertension       * venlafaxine  37.5 MG 24 hr capsule    EFFEXOR-XR    90 capsule    Take with 75 mg for total of 112.5 mg once daily    Generalized anxiety disorder, Major depressive disorder, recurrent episode, moderate (H)       * venlafaxine 75 MG 24 hr capsule    EFFEXOR-XR    90 capsule    Take 1 capsule (75 mg) by mouth daily    Major depressive disorder, recurrent episode, moderate (H), Generalized anxiety disorder       * Notice:  This list has 2 medication(s) that are the same as other medications prescribed for you. Read the directions carefully, and ask your doctor or other care provider to review them with you.

## 2018-09-13 ENCOUNTER — OFFICE VISIT (OUTPATIENT)
Dept: SLEEP MEDICINE | Facility: CLINIC | Age: 38
End: 2018-09-13
Payer: COMMERCIAL

## 2018-09-13 DIAGNOSIS — G47.11 IDIOPATHIC HYPERSOMNIA WITH LONG SLEEP TIME: Primary | ICD-10-CM

## 2018-09-13 LAB — SLPCOMP: NORMAL

## 2018-09-13 PROCEDURE — 99214 OFFICE O/P EST MOD 30 MIN: CPT | Performed by: FAMILY MEDICINE

## 2018-09-13 RX ORDER — METHYLPHENIDATE HYDROCHLORIDE 10 MG/1
TABLET ORAL
Qty: 60 TABLET | Refills: 0 | Status: SHIPPED | OUTPATIENT
Start: 2018-09-13 | End: 2018-12-04

## 2018-09-13 NOTE — PROGRESS NOTES
Sleep Study Follow-Up Visit:    Date on this visit: 9/13/2018    Cecile Lim is seen the morning following her sleep study done on 9/12/2018 at the [facility] [place] for excessive daytime sleepiness and possible sleep apnea.    Pertinent PMHx of HTN, obesity, LAURA, MDD.    9/6/2018 - Initial consult.  Concerns raised in regards to sleep ~1 year ago given concerns of daytime fatigue and a longer sleep need.  In retrospect, she thinks she has probably needed more sleep for a number of years, and may not necessarily be new.  She became more motivated to seek evaluation now given her snoring is incredibly loud and bothersome, as well as hearing from co-workers on how much better they feel after treating for sleep apnea.  A/P for in-lab PSG, potential long sleep need.    Today - Patient being seen morning after study, so some detailed information has not been generated by computer system, but I reviewed the study in its entirety.    AHI ~3, baseline SpO2 95%, slick SpO2 ~89%.  Infrequent PLM's, normal arousal index.  Sleep architecture quite typical, outside of a mildly delayed REM latency.    Past medical/surgical history, family history, social history, medications and allergies were reviewed.      Problem List:  Patient Active Problem List    Diagnosis Date Noted     Encounter for surveillance of contraceptives 03/10/2016     Priority: Medium      has had vasectomy       Lipoma of back 11/12/2015     Priority: Medium     Major depressive disorder, recurrent episode, in partial remission (HCC) 12/23/2014     Priority: Medium     Essential hypertension 09/18/2012     Priority: Medium     Stress incontinence, female 02/27/2012     Priority: Medium     CARDIOVASCULAR SCREENING; LDL GOAL LESS THAN 160 10/31/2010     Priority: Medium     Generalized anxiety disorder 10/20/2010     Priority: Medium     Diagnosis updated by automated process. Provider to review and confirm.       Ureteral stone  07/09/2009     Priority: Medium        Impression/Plan:    1.)  No significant sleep disordered breathing  2.)  Primary snoring  3.)  Long sleep time, potential for idiopathic hypersomnia    Discussed use of oral appliance for snoring.  Discussed importance and tools for increasing total sleep time.  Given significant symptoms, also discussed careful trial of methylphenidate 5-10mg PO BID to TID PRN.    She will follow up with me in about 1 month(s).     Twenty-five minutes spent with patient, all of which were spent face-to-face counseling, consulting, coordinating plan of care.      Patrick Beach MD    CC: Fawn Talley

## 2018-09-13 NOTE — MR AVS SNAPSHOT
After Visit Summary   9/13/2018    Cecile Lim    MRN: [id number]           Patient Information     Date Of Birth          1980        Visit Information        Provider Department      9/13/2018 7:30 AM Patrick Beach MD [facility]        Today's Diagnoses     Idiopathic hypersomnia with long sleep time    -  1       Follow-ups after your visit        Follow-up notes from your care team     Return in about 4 weeks (around 10/11/2018), or if symptoms worsen or fail to improve.      Who to contact     If you have questions or need follow up information about today's clinic visit or your schedule please contact [facility] directly at [phone].  Normal or non-critical lab and imaging results will be communicated to you by MyChart, letter or phone within 4 business days after the clinic has received the results. If you do not hear from us within 7 days, please contact the clinic through [employer] or phone. If you have a critical or abnormal lab result, we will notify you by phone as soon as possible.  Submit refill requests through [employer] or call your pharmacy and they will forward the refill request to us. Please allow 3 business days for your refill to be completed.          Additional Information About Your Visit        MyChart Information     [employer] gives you secure access to your electronic health record. If you see a primary care provider, you can also send messages to your care team and make appointments. If you have questions, please call your primary care clinic.  If you do not have a primary care provider, please call [phone] and they will assist you.        Care EveryWhere ID     This is your Care EveryWhere ID. This could be used by other organizations to access your [place] medical records  [id number]         Blood Pressure from Last 3 Encounters:   09/06/18 122/70   06/19/18 (!) 136/93   05/15/18 115/78    Weight from Last  3 Encounters:   09/06/18 72.3 kg (159 lb 6.4 oz)   06/19/18 72.6 kg (160 lb)   05/15/18 72.8 kg (160 lb 6.4 oz)              Today, you had the following     No orders found for display         Today's Medication Changes          These changes are accurate as of 9/13/18  9:25 AM.  If you have any questions, ask your nurse or doctor.               Start taking these medicines.        Dose/Directions    methylphenidate 10 MG tablet   Commonly known as:  RITALIN   Used for:  Idiopathic hypersomnia with long sleep time        Take 1/2 - 1 tablet by mouth twice daily as needed.   Quantity:  60 tablet   Refills:  0            Where to get your medicines      Some of these will need a paper prescription and others can be bought over the counter.  Ask your nurse if you have questions.     Bring a paper prescription for each of these medications     methylphenidate 10 MG tablet                Primary Care Provider Office Phone # Fax #    Fawn Henderson Talley,  [phone] [address] [address]        Equal Access to Services     CARYL JON : Hadii delmy khan hadasho Soomaali, waaxda luqadaha, qaybta kaalmada adeegyasarah, krista oliveros . So [facility] [phone].    ATENCIÓN: Si habla español, tiene a everett disposición servicios gratuitos de asistencia lingüística. Llame al [phone].    We comply with applicable federal civil rights laws and [place] laws. We do not discriminate on the basis of race, color, national origin, age, disability, sex, sexual orientation, or gender identity.            Thank you!     Thank you for choosing [facility]  for your care. Our goal is always to provide you with excellent care. Hearing back from our patients is one way we can continue to improve our services. Please take a few minutes to complete the written survey that you may receive in the mail after your visit with us. Thank you!             Your Updated  Medication List - Protect others around you: Learn how to safely use, store and throw away your medicines at www.disposemymeds.org.          This list is accurate as of 9/13/18  9:25 AM.  Always use your most recent med list.                   Brand Name Dispense Instructions for use Diagnosis    lisinopril 10 MG tablet    PRINIVIL/ZESTRIL    90 tablet    Take 1 tablet (10 mg) by mouth daily    Essential hypertension       methylphenidate 10 MG tablet    RITALIN    60 tablet    Take 1/2 - 1 tablet by mouth twice daily as needed.    Idiopathic hypersomnia with long sleep time       * venlafaxine 37.5 MG 24 hr capsule    EFFEXOR-XR    90 capsule    Take with 75 mg for total of 112.5 mg once daily    Generalized anxiety disorder, Major depressive disorder, recurrent episode, moderate (H)       * venlafaxine 75 MG 24 hr capsule    EFFEXOR-XR    90 capsule    Take 1 capsule (75 mg) by mouth daily    Major depressive disorder, recurrent episode, moderate (H), Generalized anxiety disorder       * Notice:  This list has 2 medication(s) that are the same as other medications prescribed for you. Read the directions carefully, and ask your doctor or other care provider to review them with you.

## 2018-09-13 NOTE — PROGRESS NOTES
Pt arrived at 8:45pm, was told to be here at 9pm when tech called.    Diagnostic PSG completed per provider order.  Patient did not meet criteria for PAP therapy.

## 2018-10-05 ENCOUNTER — OFFICE VISIT (OUTPATIENT)
Dept: FAMILY MEDICINE | Facility: CLINIC | Age: 38
End: 2018-10-05
Payer: COMMERCIAL

## 2018-10-05 VITALS
WEIGHT: 158.2 LBS | RESPIRATION RATE: 16 BRPM | DIASTOLIC BLOOD PRESSURE: 88 MMHG | TEMPERATURE: 98.9 F | HEART RATE: 76 BPM | BODY MASS INDEX: 30.9 KG/M2 | SYSTOLIC BLOOD PRESSURE: 132 MMHG | OXYGEN SATURATION: 97 %

## 2018-10-05 DIAGNOSIS — J20.9 ACUTE BRONCHITIS, UNSPECIFIED ORGANISM: Primary | ICD-10-CM

## 2018-10-05 PROCEDURE — 99213 OFFICE O/P EST LOW 20 MIN: CPT | Performed by: FAMILY MEDICINE

## 2018-10-05 RX ORDER — AZITHROMYCIN 250 MG/1
TABLET, FILM COATED ORAL
Qty: 6 TABLET | Refills: 0 | Status: SHIPPED | OUTPATIENT
Start: 2018-10-05 | End: 2018-11-20

## 2018-10-05 RX ORDER — PREDNISONE 20 MG/1
40 TABLET ORAL DAILY
Qty: 10 TABLET | Refills: 0 | Status: SHIPPED | OUTPATIENT
Start: 2018-10-05 | End: 2018-10-10

## 2018-10-05 ASSESSMENT — ANXIETY QUESTIONNAIRES
7. FEELING AFRAID AS IF SOMETHING AWFUL MIGHT HAPPEN: NOT AT ALL
1. FEELING NERVOUS, ANXIOUS, OR ON EDGE: SEVERAL DAYS
5. BEING SO RESTLESS THAT IT IS HARD TO SIT STILL: SEVERAL DAYS
3. WORRYING TOO MUCH ABOUT DIFFERENT THINGS: SEVERAL DAYS
GAD7 TOTAL SCORE: 6
6. BECOMING EASILY ANNOYED OR IRRITABLE: SEVERAL DAYS
IF YOU CHECKED OFF ANY PROBLEMS ON THIS QUESTIONNAIRE, HOW DIFFICULT HAVE THESE PROBLEMS MADE IT FOR YOU TO DO YOUR WORK, TAKE CARE OF THINGS AT HOME, OR GET ALONG WITH OTHER PEOPLE: SOMEWHAT DIFFICULT
2. NOT BEING ABLE TO STOP OR CONTROL WORRYING: SEVERAL DAYS

## 2018-10-05 ASSESSMENT — PATIENT HEALTH QUESTIONNAIRE - PHQ9: 5. POOR APPETITE OR OVEREATING: SEVERAL DAYS

## 2018-10-05 NOTE — MR AVS SNAPSHOT
After Visit Summary   10/5/2018    Cecile Lim    MRN: [id number]           Patient Information     Date Of Birth          1980        Visit Information        Provider Department      10/5/2018 9:40 AM Milly Mortensen MD [facility]        Today's Diagnoses     Acute bronchitis, unspecified organism    -  1      Care Instructions      What Is Acute Bronchitis?  Acute bronchitis is when the airways in your lungs (bronchial tubes) become red and swollen (inflamed). It is usually caused by a viral infection. But it can also occur because of a bacteria or allergen. Symptoms include a cough that produces yellow or greenish mucus and can last for days or sometimes weeks.  Inside healthy lungs    Air travels in and out of the lungs through the airways. The linings of these airways produce sticky mucus. This mucus traps particles that enter the lungs. Tiny structures called cilia then sweep the particles out of the airways.     Healthy airway: Airways are normally open. Air moves in and out easily.      Healthy cilia: Tiny, hairlike cilia sweep mucus and particles up and out of the airways.     Lungs with bronchitis  Bronchitis often occurs with a cold or the flu virus. The airways become inflamed (red and swollen). There is a deep hacking cough from the extra mucus. Other symptoms may include:    Wheezing    Chest discomfort    Shortness of breath    Mild fever  A second infection, this time due to bacteria, may then occur. And airways irritated by allergens or smoke are more likely to get infected.        Inflamed airway: Inflammation and extra mucus narrow the airway, causing shortness of breath.      Impaired cilia: Extra mucus impairs cilia, causing congestion and wheezing. Smoking makes the problem worse.     Making a diagnosis  A physical exam, health history, and certain tests help your healthcare provider make the diagnosis.  Health history  Your healthcare  provider will ask you about your symptoms.  The exam  Your provider listens to your chest for signs of congestion. He or she may also check your ears, nose, and throat.  Possible tests    A sputum test for bacteria. This requires a sample of mucus from your lungs.    A nasal or throat swab. This tests to see if you have a bacterial infection.    A chest X-ray. This is done if your healthcare provider thinks you have pneumonia.    Tests to check for an underlying condition. Other tests may be done to check for things such as allergies, asthma, or COPD (chronic obstructive pulmonary disease). You may need to see a specialist for more lung function testing.  Treating a cough  The main treatment for bronchitis is easing symptoms. Avoiding smoke, allergens, and other things that trigger coughing can often help. If the infection is bacterial, you may be given antibiotics. During the illness, it's important to get plenty of sleep. To ease symptoms:    Don t smoke. Also avoid secondhand smoke.    Use a humidifier. Or try breathing in steam from a hot shower. This may help loosen mucus.    Drink a lot of water and juice. They can soothe the throat and may help thin mucus.    Sit up or use extra pillows when in bed. This helps to lessen coughing and congestion.    Ask your provider about using medicine. Ask about using cough medicine, pain and fever medicine, or a decongestant.  Antibiotics  Most cases of bronchitis are caused by cold or flu viruses. They don t need antibiotics to treat them, even if your mucus is thick and green or yellow. Antibiotics don t treat viral illness and antibiotics have not been shown to have any benefit in cases of acute bronchitis. Taking antibiotics when they are not needed increases your risk of getting an infection later that is antibiotic-resistant. Antibiotics can also cause severe cases of diarrhea that require other antibiotics to treat.  It is important that you accept your healthcare  provider's opinion to not use antibiotics. Your provider will prescribe antibiotics if the infection is caused by bacteria. If they are prescribed:    Take all of the medicine. Take the medicine until it is used up, even if symptoms have improved. If you don t, the bronchitis may come back.    Take the medicines as directed. For instance, some medicines should be taken with food.    Ask about side effects. Ask your provider or pharmacist what side effects are common, and what to do about them.  Follow-up care  You should see your provider again in 2 to 3 weeks. By this time, symptoms should have improved. An infection that lasts longer may mean you have a more serious problem.  Prevention    Avoid tobacco smoke. If you smoke, quit. Stay away from smoky places. Ask friends and family not to smoke around you, or in your home or car.    Get checked for allergies.    Ask your provider about getting a yearly flu shot. Also ask about pneumococcal or pneumonia shots.    Wash your hands often. This helps reduce the chance of picking up viruses that cause colds and flu.  Call your healthcare provider if:    Symptoms worsen, or you have new symptoms    Breathing problems worsen or  become severe    Symptoms don t get better within a week, or within 3 days of taking antibiotics   Date Last Reviewed: 2/1/2017 2000-2017 [employer]. [address] [place] [address], [place] [id number]. All rights reserved. This information is not intended as a substitute for professional medical care. Always follow your healthcare professional's instructions.                Follow-ups after your visit        Who to contact     If you have questions or need follow up information about today's clinic visit or your schedule please contact [facility] directly at [phone].  Normal or non-critical lab and imaging results will be communicated to you by MyChart, letter or phone within 4 business days after the clinic has received  the results. If you do not hear from us within 7 days, please contact the clinic through [employer] or phone. If you have a critical or abnormal lab result, we will notify you by phone as soon as possible.  Submit refill requests through [employer] or call your pharmacy and they will forward the refill request to us. Please allow 3 business days for your refill to be completed.          Additional Information About Your Visit        [employer] Information     [employer] gives you secure access to your electronic health record. If you see a primary care provider, you can also send messages to your care team and make appointments. If you have questions, please call your primary care clinic.  If you do not have a primary care provider, please call [phone] and they will assist you.        Care EveryWhere ID     This is your Care EveryWhere ID. This could be used by other organizations to access your [place] medical records  [id number]        Your Vitals Were     Pulse Temperature Respirations Last Period Pulse Oximetry BMI (Body Mass Index)    76 98.9  F (37.2  C) (Tympanic) 16 09/14/2018 (Approximate) 97% 30.9 kg/m2       Blood Pressure from Last 3 Encounters:   10/05/18 132/88   09/06/18 122/70   06/19/18 (!) 136/93    Weight from Last 3 Encounters:   10/05/18 158 lb 3.2 oz (71.8 kg)   09/06/18 159 lb 6.4 oz (72.3 kg)   06/19/18 160 lb (72.6 kg)              Today, you had the following     No orders found for display         Today's Medication Changes          These changes are accurate as of 10/5/18 10:09 AM.  If you have any questions, ask your nurse or doctor.               Start taking these medicines.        Dose/Directions    azithromycin 250 MG tablet   Commonly known as:  ZITHROMAX   Used for:  Acute bronchitis, unspecified organism   Started by:  Milly Mortensen MD        Two tablets first day, then one tablet daily for four days.   Quantity:  6 tablet   Refills:  0       predniSONE 20 MG tablet    Commonly known as:  DELTASONE   Used for:  Acute bronchitis, unspecified organism   Started by:  Milly Mortensen MD        Dose:  40 mg   Take 2 tablets (40 mg) by mouth daily for 5 days   Quantity:  10 tablet   Refills:  0            Where to get your medicines      These medications were sent to [facility] - - AXEL Mendoza - [id number]  [id number] [place] [address], [facility] [phone]    Hours:  GEOVANY Mendoza [facility] Phone:  [phone]     azithromycin 250 MG tablet    predniSONE 20 MG tablet                Primary Care Provider Office Phone # Fax #    Fawn Talley,  [phone] [address] [facility] [address]        Equal Access to Services     CARYL JON : Shala boyleo Sojuan, waaxda luqadaha, qaybta kaalmada adeegyada, krista oliveros . So [place] -[phone].    ATENCIÓN: Si habla español, tiene a everett disposición servicios gratuitos de asistencia lingüística. Llame al [phone].    We comply with applicable federal civil rights laws and [place] laws. We do not discriminate on the basis of race, color, national origin, age, disability, sex, sexual orientation, or gender identity.            Thank you!     Thank you for choosing [facility]  for your care. Our goal is always to provide you with excellent care. Hearing back from our patients is one way we can continue to improve our services. Please take a few minutes to complete the written survey that you may receive in the mail after your visit with us. Thank you!             Your Updated Medication List - Protect others around you: Learn how to safely use, store and throw away your medicines at www.disposemymeds.org.          This list is accurate as of 10/5/18 10:09 AM.  Always use your most recent med list.                   Brand Name Dispense Instructions for use Diagnosis    azithromycin 250 MG tablet    ZITHROMAX    6  tablet    Two tablets first day, then one tablet daily for four days.    Acute bronchitis, unspecified organism       lisinopril 10 MG tablet    PRINIVIL/ZESTRIL    90 tablet    Take 1 tablet (10 mg) by mouth daily    Essential hypertension       methylphenidate 10 MG tablet    RITALIN    60 tablet    Take 1/2 - 1 tablet by mouth twice daily as needed.    Idiopathic hypersomnia with long sleep time       predniSONE 20 MG tablet    DELTASONE    10 tablet    Take 2 tablets (40 mg) by mouth daily for 5 days    Acute bronchitis, unspecified organism       * venlafaxine 37.5 MG 24 hr capsule    EFFEXOR-XR    90 capsule    Take with 75 mg for total of 112.5 mg once daily    Generalized anxiety disorder, Major depressive disorder, recurrent episode, moderate (H)       * venlafaxine 75 MG 24 hr capsule    EFFEXOR-XR    90 capsule    Take 1 capsule (75 mg) by mouth daily    Major depressive disorder, recurrent episode, moderate (H), Generalized anxiety disorder       * Notice:  This list has 2 medication(s) that are the same as other medications prescribed for you. Read the directions carefully, and ask your doctor or other care provider to review them with you.

## 2018-10-05 NOTE — PROGRESS NOTES
SUBJECTIVE:   Cecile Lim is a 37 year old female who presents to clinic today for the following health issues:    Acute Illness   Acute illness concerns: URI  Onset: x 1 weeks    Fever: no    Chills/Sweats: YES- sweating    Headache (location?): YES    Sinus Pressure:YES    Conjunctivitis:  no    Ear Pain: no    Rhinorrhea: YES    Congestion: YES    Sore Throat: YES     Cough: YES    Wheeze: YES    Decreased Appetite: YES    Nausea: YES- dizziness    Vomiting: no    Diarrhea:  no    Dysuria/Freq.: no    Fatigue/Achiness: YES- fatigue    Sick/Strep Exposure: yes- work     Therapies Tried and outcome: nyquil, dayquil, ibuprofen.      Patient is a 37 yr old female here for cough and shortness of breath with wheezing . Symptoms have been progressing for a while. She works as an RN. She denies fevers but has had chills.     Problem list and histories reviewed & adjusted, as indicated.  Additional history: as documented    Patient Active Problem List   Diagnosis     Ureteral stone     Generalized anxiety disorder     CARDIOVASCULAR SCREENING; LDL GOAL LESS THAN 160     Stress incontinence, female     Essential hypertension     Major depressive disorder, recurrent episode, in partial remission (HCC)     Lipoma of back     Encounter for surveillance of contraceptives     Past Surgical History:   Procedure Laterality Date     NO HISTORY OF SURGERY  1/2007     SLING TRANSVAGINAL N/A 4/27/2018    Procedure: SLING TRANSVAGINAL;  Pubovaginal sling ([place]);  Surgeon: Douglas Hopkins MD;  Location: [facility] OR       Social History   Substance Use Topics     Smoking status: Never Smoker     Smokeless tobacco: Never Used     Alcohol use 0.0 oz/week     0 Standard drinks or equivalent per week      Comment: Rare. Maybe one drink a month     Family History   Problem Relation Age of Onset     Hypertension Mother      Musculoskeletal Disorder Mother      degenerative disc disease     Thyroid Disease Mother      Depression  Mother      Cerebrovascular Disease Mother      Hypertension Father      Cerebrovascular Disease Father      age 40     Hypertension Maternal Grandfather      Cardiovascular Maternal Grandfather      Cancer Paternal Grandfather      lung     Asthma No family hx of      C.A.D. No family hx of      Diabetes No family hx of      Breast Cancer No family hx of      Cancer - colorectal No family hx of      Prostate Cancer No family hx of          Current Outpatient Prescriptions   Medication Sig Dispense Refill     azithromycin (ZITHROMAX) 250 MG tablet Two tablets first day, then one tablet daily for four days. 6 tablet 0     lisinopril (PRINIVIL/ZESTRIL) 10 MG tablet Take 1 tablet (10 mg) by mouth daily 90 tablet 3     methylphenidate (RITALIN) 10 MG tablet Take 1/2 - 1 tablet by mouth twice daily as needed. 60 tablet 0     predniSONE (DELTASONE) 20 MG tablet Take 2 tablets (40 mg) by mouth daily for 5 days 10 tablet 0     venlafaxine (EFFEXOR-XR) 37.5 MG 24 hr capsule Take with 75 mg for total of 112.5 mg once daily 90 capsule 3     venlafaxine (EFFEXOR-XR) 75 MG 24 hr capsule Take 1 capsule (75 mg) by mouth daily 90 capsule 3     No Known Allergies  BP Readings from Last 3 Encounters:   10/05/18 132/88   09/06/18 122/70   06/19/18 (!) 136/93    Wt Readings from Last 3 Encounters:   10/05/18 158 lb 3.2 oz (71.8 kg)   09/06/18 159 lb 6.4 oz (72.3 kg)   06/19/18 160 lb (72.6 kg)                  Labs reviewed in EPIC    Reviewed and updated as needed this visit by clinical staff       Reviewed and updated as needed this visit by Provider         ROS:  Constitutional, HEENT, cardiovascular, pulmonary, gi and gu systems are negative, except as otherwise noted.    OBJECTIVE:     /88  Pulse 76  Temp 98.9  F (37.2  C) (Tympanic)  Resp 16  Wt 158 lb 3.2 oz (71.8 kg)  LMP 09/14/2018 (Approximate)  SpO2 97%  BMI 30.9 kg/m2  Body mass index is 30.9 kg/(m^2).  GENERAL: healthy, alert and no distress  NECK: no  adenopathy, no asymmetry, masses, or scars and thyroid normal to palpation  RESP: rhonchi L upper anterior, L upper posterior and L mid anterior  CV: regular rate and rhythm, normal S1 S2, no S3 or S4, no murmur, click or rub, no peripheral edema and peripheral pulses strong  ABDOMEN: soft, nontender, no hepatosplenomegaly, no masses and bowel sounds normal  MS: no gross musculoskeletal defects noted, no edema    Diagnostic Test Results:  none     ASSESSMENT/PLAN:       1. Acute bronchitis, unspecified organism  Antibiotics and some prednisone faxed   - azithromycin (ZITHROMAX) 250 MG tablet; Two tablets first day, then one tablet daily for four days.  Dispense: 6 tablet; Refill: 0  - predniSONE (DELTASONE) 20 MG tablet; Take 2 tablets (40 mg) by mouth daily for 5 days  Dispense: 10 tablet; Refill: 0    FUTURE APPOINTMENTS:       - Follow-up visit as needed    Milly Mortensen MD  [facility]

## 2018-10-05 NOTE — PATIENT INSTRUCTIONS

## 2018-10-06 ASSESSMENT — ANXIETY QUESTIONNAIRES: GAD7 TOTAL SCORE: 6

## 2018-10-06 ASSESSMENT — PATIENT HEALTH QUESTIONNAIRE - PHQ9: SUM OF ALL RESPONSES TO PHQ QUESTIONS 1-9: 2

## 2018-11-20 ENCOUNTER — OFFICE VISIT (OUTPATIENT)
Dept: SURGERY | Facility: CLINIC | Age: 38
End: 2018-11-20
Payer: COMMERCIAL

## 2018-11-20 VITALS
TEMPERATURE: 98 F | SYSTOLIC BLOOD PRESSURE: 125 MMHG | HEART RATE: 65 BPM | HEIGHT: 60 IN | BODY MASS INDEX: 30.63 KG/M2 | DIASTOLIC BLOOD PRESSURE: 82 MMHG | WEIGHT: 156 LBS

## 2018-11-20 DIAGNOSIS — R22.2 MASS ON BACK: Primary | ICD-10-CM

## 2018-11-20 PROCEDURE — 99243 OFF/OP CNSLTJ NEW/EST LOW 30: CPT | Performed by: SURGERY

## 2018-11-20 ASSESSMENT — PAIN SCALES - GENERAL: PAINLEVEL: MILD PAIN (2)

## 2018-11-20 NOTE — MR AVS SNAPSHOT
After Visit Summary   11/20/2018    Cecile Lim    MRN: [id number]           Patient Information     Date Of Birth          1980        Visit Information        Provider Department      11/20/2018 3:00 PM Rolando Claros, DO [facility]        Today's Diagnoses     Mass on back    -  1       Follow-ups after your visit        Who to contact     If you have questions or need follow up information about today's clinic visit or your schedule please contact [facility] directly at [phone].  Normal or non-critical lab and imaging results will be communicated to you by Room[employer]vealhart, letter or phone within 4 business days after the clinic has received the results. If you do not hear from us within 7 days, please contact the clinic through [employer] or phone. If you have a critical or abnormal lab result, we will notify you by phone as soon as possible.  Submit refill requests through [employer] or call your pharmacy and they will forward the refill request to us. Please allow 3 business days for your refill to be completed.          Additional Information About Your Visit        MyChart Information     [employer] gives you secure access to your electronic health record. If you see a primary care provider, you can also send messages to your care team and make appointments. If you have questions, please call your primary care clinic.  If you do not have a primary care provider, please call [phone] and they will assist you.        Care EveryWhere ID     This is your Care EveryWhere ID. This could be used by other organizations to access your [facility] medical records  [id number]        Your Vitals Were     Pulse Temperature Height BMI (Body Mass Index)          65 98  F (36.7  C) (Oral) 1.524 m (5') 30.47 kg/m2         Blood Pressure from Last 3 Encounters:   11/20/18 125/82   10/05/18 132/88   09/06/18 122/70    Weight from Last 3 Encounters:   11/20/18 70.8 kg (156  lb)   10/05/18 71.8 kg (158 lb 3.2 oz)   09/06/18 72.3 kg (159 lb 6.4 oz)              Today, you had the following     No orders found for display         Today's Medication Changes          These changes are accurate as of 11/20/18  3:25 PM.  If you have any questions, ask your nurse or doctor.               Stop taking these medicines if you haven't already. Please contact your care team if you have questions.     azithromycin 250 MG tablet   Commonly known as:  ZITHROMAX   Stopped by:  Rolando Claros,                     Primary Care Provider Office Phone # Fax #    Fawn TalleyDO [phone] [address] [facility] [address]        Equal Access to Services     CARYL JON : Shala boyleo Sojuan, waaxda luqadaha, qaybta kaalmada adeegyada, krista oliveros . So [facility] [phone].    ATENCIÓN: Si habla español, tiene a everett disposición servicios gratuitos de asistencia lingüística. Llame al [phone].    We comply with applicable federal civil rights laws and [place] laws. We do not discriminate on the basis of race, color, national origin, age, disability, sex, sexual orientation, or gender identity.            Thank you!     Thank you for choosing [facility]  for your care. Our goal is always to provide you with excellent care. Hearing back from our patients is one way we can continue to improve our services. Please take a few minutes to complete the written survey that you may receive in the mail after your visit with us. Thank you!             Your Updated Medication List - Protect others around you: Learn how to safely use, store and throw away your medicines at www.disposemymeds.org.          This list is accurate as of 11/20/18  3:25 PM.  Always use your most recent med list.                   Brand Name Dispense Instructions for use Diagnosis    lisinopril 10 MG tablet    PRINIVIL/ZESTRIL    90 tablet    Take 1  tablet (10 mg) by mouth daily    Essential hypertension       methylphenidate 10 MG tablet    RITALIN    60 tablet    Take 1/2 - 1 tablet by mouth twice daily as needed.    Idiopathic hypersomnia with long sleep time       * venlafaxine 37.5 MG 24 hr capsule    EFFEXOR-XR    90 capsule    Take with 75 mg for total of 112.5 mg once daily    Generalized anxiety disorder, Major depressive disorder, recurrent episode, moderate (H)       * venlafaxine 75 MG 24 hr capsule    EFFEXOR-XR    90 capsule    Take 1 capsule (75 mg) by mouth daily    Major depressive disorder, recurrent episode, moderate (H), Generalized anxiety disorder       * Notice:  This list has 2 medication(s) that are the same as other medications prescribed for you. Read the directions carefully, and ask your doctor or other care provider to review them with you.

## 2018-11-20 NOTE — PROGRESS NOTES
Surgical Consultation/History and Physical  [facility]    Cecile is seen in consultation for lipoma, at the request of Fawn Talley DO.    Chief Complaint:  Back Lipoma    History of Present Illness: Cecile Lim is a 37 year old female presents with back mass.  She admits it has been there or several years.  It increased size, causing her discomfort particularly when lying on it.  Denies any skin changes, drainage, previous history of lipomas.  She was previously seen 3 years ago for similar.  This is in exact same spot though larger.    Patient Active Problem List   Diagnosis     Ureteral stone     Generalized anxiety disorder     CARDIOVASCULAR SCREENING; LDL GOAL LESS THAN 160     Stress incontinence, female     Essential hypertension     Major depressive disorder, recurrent episode, in partial remission (HCC)     Lipoma of back     Encounter for surveillance of contraceptives     Past Medical History:   Diagnosis Date     LAURA (generalised anxiety disorder)      HTN (hypertension)      Suicide attempt (H) 11/2014     Past Surgical History:   Procedure Laterality Date     NO HISTORY OF SURGERY  1/2007     SLING TRANSVAGINAL N/A 4/27/2018    Procedure: SLING TRANSVAGINAL;  Pubovaginal sling ([facility]);  Surgeon: Douglas Hopkins MD;  Location: [facility] OR     Family History   Problem Relation Age of Onset     Hypertension Mother      Musculoskeletal Disorder Mother      degenerative disc disease     Thyroid Disease Mother      Depression Mother      Cerebrovascular Disease Mother      Hypertension Father      Cerebrovascular Disease Father      age 40     Hypertension Maternal Grandfather      Cardiovascular Maternal Grandfather      Cancer Paternal Grandfather      lung     Asthma No family hx of      C.A.D. No family hx of      Diabetes No family hx of      Breast Cancer No family hx of      Cancer - colorectal No family hx of      Prostate Cancer No family hx of      Social History    Substance Use Topics     Smoking status: Never Smoker     Smokeless tobacco: Never Used     Alcohol use 0.0 oz/week     0 Standard drinks or equivalent per week      Comment: Rare. Maybe one drink a month      History   Drug Use No     Current Outpatient Prescriptions   Medication Sig Dispense Refill     azithromycin (ZITHROMAX) 250 MG tablet Two tablets first day, then one tablet daily for four days. 6 tablet 0     lisinopril (PRINIVIL/ZESTRIL) 10 MG tablet Take 1 tablet (10 mg) by mouth daily 90 tablet 3     methylphenidate (RITALIN) 10 MG tablet Take 1/2 - 1 tablet by mouth twice daily as needed. 60 tablet 0     venlafaxine (EFFEXOR-XR) 37.5 MG 24 hr capsule Take with 75 mg for total of 112.5 mg once daily 90 capsule 3     venlafaxine (EFFEXOR-XR) 75 MG 24 hr capsule Take 1 capsule (75 mg) by mouth daily 90 capsule 3     No Known Allergies    Review of Systems:   Constitutional - Denies fevers, weight loss, malaise, lethargy  Neuro - Denies tremors or seizures  Pulmon - Denies SOB, dyspnea, hemoptysis, chronic cough or use of an inhaler  CV - Denies CP, SOB, lower extremity edema, difficulty w/ stairs, has never used NTG  GI - Denies hematemesis, BRBPR, melena, chronic diarrhea or epigastric pain   - Denies hematuria, difficulty voiding, h/o STDs  Hematology - Denies blood clotting disorders, chronic anemias  Dermatology - No melanomas or skin cancers  Rheumatology - No h/o RA  Pysch - + Depression and anxiety    Physical Exam:  /82 (BP Location: Right arm, Patient Position: Sitting, Cuff Size: Adult Regular)  Pulse 65  Temp 98  F (36.7  C) (Oral)  Ht 1.524 m (5')  Wt 70.8 kg (156 lb)  BMI 30.47 kg/m2    Constitutional- No acute distress, well nourished, non-toxic  Eyes: Anicteric, no injection.  PERRL  ENT:  Normocephalic, atraumatic, Nose midline, moist mucus membranes  Neck - supple, no LAD  Respiratory- Clear to auscultation bilaterally, good inspiratory effort  Cardiovascular - Heart RRR, no  lift's, thrills, murmurs, rubs, or gallop.  No peripheral edema.  No clubbing.  Abdomen - Soft, non-tender, +BS, no hepatosplenomegaly, no palpable masses  Neuro - No focal neuro deficits, Alert and oriented x 3  Psych: Appropriate mood and affect  Musculoskeletal: Normal gait, symmetric strength.  FROM upper and lower extremities.  Skin: Warm, Dry.  5.0 cm x 2.0 cm subcutaneous mass left lower back.  No overlying skin changes.  POC ultrasound performed showing mass consistent with lipoma above the fascia.    Assessment:  1. Mass on back      Plan:   Mrs. Lim presents for evaluation of enlarging back mass which is causing discomfort.  She may benefit from excision.  Given size and patient's anxiety surrounding procedure, this would be best performed in the operating room.  She is able to obtain at least 4 METS of activity without shortness of breath or chest pain and is cleared for surgery.  The indications, risks, benefits and alternatives of excision of this mass were discussed with Cecile Lim. The risks discussed included but were not limited to bleeding, infection, seroma, need for additional treatment, nontherapeutic intervention, wound complication (such as dehiscence), and rare complications related to surgery and/or anesthesia such as venous thromboembolism and cardiorespiratory complications.  All of her questions were answered thoroughly and to her satisfaction, and informed consent was obtained. Consent was verified via teach back methodology.    This will be scheduled at her convenience.    Rolando Claros DO on 11/20/2018 at 3:01 PM

## 2018-11-20 NOTE — LETTER
11/20/2018         RE: Cecile Lim  [address] [place] [address]        Dear Colleague,    Thank you for referring your patient, Cecile Lim, to the [facility]. Please see a copy of my visit note below.    Surgical Consultation/History and Physical  [facility]    Cecile is seen in consultation for lipoma, at the request of Fawn Talley DO.    Chief Complaint:  Back Lipoma    History of Present Illness: Cecile Lim is a 37 year old female presents with back mass.  She admits it has been there or several years.  It increased size, causing her discomfort particularly when lying on it.  Denies any skin changes, drainage, previous history of lipomas.  She was previously seen 3 years ago for similar.  This is in exact same spot though larger.    Patient Active Problem List   Diagnosis     Ureteral stone     Generalized anxiety disorder     CARDIOVASCULAR SCREENING; LDL GOAL LESS THAN 160     Stress incontinence, female     Essential hypertension     Major depressive disorder, recurrent episode, in partial remission (HCC)     Lipoma of back     Encounter for surveillance of contraceptives     Past Medical History:   Diagnosis Date     LAURA (generalised anxiety disorder)      HTN (hypertension)      Suicide attempt (H) 11/2014     Past Surgical History:   Procedure Laterality Date     NO HISTORY OF SURGERY  1/2007     SLING TRANSVAGINAL N/A 4/27/2018    Procedure: SLING TRANSVAGINAL;  Pubovaginal sling ([facility]);  Surgeon: Douglas Hopkins MD;  Location: [place] OR     Family History   Problem Relation Age of Onset     Hypertension Mother      Musculoskeletal Disorder Mother      degenerative disc disease     Thyroid Disease Mother      Depression Mother      Cerebrovascular Disease Mother      Hypertension Father      Cerebrovascular Disease Father      age 40     Hypertension Maternal Grandfather      Cardiovascular Maternal Grandfather      Cancer  Paternal Grandfather      lung     Asthma No family hx of      C.A.D. No family hx of      Diabetes No family hx of      Breast Cancer No family hx of      Cancer - colorectal No family hx of      Prostate Cancer No family hx of      Social History   Substance Use Topics     Smoking status: Never Smoker     Smokeless tobacco: Never Used     Alcohol use 0.0 oz/week     0 Standard drinks or equivalent per week      Comment: Rare. Maybe one drink a month      History   Drug Use No     Current Outpatient Prescriptions   Medication Sig Dispense Refill     azithromycin (ZITHROMAX) 250 MG tablet Two tablets first day, then one tablet daily for four days. 6 tablet 0     lisinopril (PRINIVIL/ZESTRIL) 10 MG tablet Take 1 tablet (10 mg) by mouth daily 90 tablet 3     methylphenidate (RITALIN) 10 MG tablet Take 1/2 - 1 tablet by mouth twice daily as needed. 60 tablet 0     venlafaxine (EFFEXOR-XR) 37.5 MG 24 hr capsule Take with 75 mg for total of 112.5 mg once daily 90 capsule 3     venlafaxine (EFFEXOR-XR) 75 MG 24 hr capsule Take 1 capsule (75 mg) by mouth daily 90 capsule 3     No Known Allergies    Review of Systems:   Constitutional - Denies fevers, weight loss, malaise, lethargy  Neuro - Denies tremors or seizures  Pulmon - Denies SOB, dyspnea, hemoptysis, chronic cough or use of an inhaler  CV - Denies CP, SOB, lower extremity edema, difficulty w/ stairs, has never used NTG  GI - Denies hematemesis, BRBPR, melena, chronic diarrhea or epigastric pain   - Denies hematuria, difficulty voiding, h/o STDs  Hematology - Denies blood clotting disorders, chronic anemias  Dermatology - No melanomas or skin cancers  Rheumatology - No h/o RA  Pysch - + Depression and anxiety    Physical Exam:  /82 (BP Location: Right arm, Patient Position: Sitting, Cuff Size: Adult Regular)  Pulse 65  Temp 98  F (36.7  C) (Oral)  Ht 1.524 m (5')  Wt 70.8 kg (156 lb)  BMI 30.47 kg/m2    Constitutional- No acute distress, well nourished,  non-toxic  Eyes: Anicteric, no injection.  PERRL  ENT:  Normocephalic, atraumatic, Nose midline, moist mucus membranes  Neck - supple, no LAD  Respiratory- Clear to auscultation bilaterally, good inspiratory effort  Cardiovascular - Heart RRR, no lift's, thrills, murmurs, rubs, or gallop.  No peripheral edema.  No clubbing.  Abdomen - Soft, non-tender, +BS, no hepatosplenomegaly, no palpable masses  Neuro - No focal neuro deficits, Alert and oriented x 3  Psych: Appropriate mood and affect  Musculoskeletal: Normal gait, symmetric strength.  FROM upper and lower extremities.  Skin: Warm, Dry.  5.0 cm x 2.0 cm subcutaneous mass left lower back.  No overlying skin changes.  POC ultrasound performed showing mass consistent with lipoma above the fascia.    Assessment:  1. Mass on back      Plan:   Mrs. Lim presents for evaluation of enlarging back mass which is causing discomfort.  She may benefit from excision.  Given size and patient's anxiety surrounding procedure, this would be best performed in the operating room.  She is able to obtain at least 4 METS of activity without shortness of breath or chest pain and is cleared for surgery.  The indications, risks, benefits and alternatives of excision of this mass were discussed with Cecile Lim. The risks discussed included but were not limited to bleeding, infection, seroma, need for additional treatment, nontherapeutic intervention, wound complication (such as dehiscence), and rare complications related to surgery and/or anesthesia such as venous thromboembolism and cardiorespiratory complications.  All of her questions were answered thoroughly and to her satisfaction, and informed consent was obtained. Consent was verified via teach back methodology.    This will be scheduled at her convenience.    Rolando Claros,  on 11/20/2018 at 3:01 PM      Again, thank you for allowing me to participate in the care of your patient.        Sincerely,        Rolando  LUIS ALFREDO Claros DO

## 2018-11-20 NOTE — NURSING NOTE
Initial /82 (BP Location: Right arm, Patient Position: Sitting, Cuff Size: Adult Regular)  Pulse 65  Temp 98  F (36.7  C) (Oral)  Ht 1.524 m (5')  Wt 70.8 kg (156 lb)  BMI 30.47 kg/m2 Estimated body mass index is 30.47 kg/(m^2) as calculated from the following:    Height as of this encounter: 1.524 m (5').    Weight as of this encounter: 70.8 kg (156 lb). .    Radha Vega CMA

## 2018-11-25 ENCOUNTER — HOSPITAL ENCOUNTER (EMERGENCY)
Facility: CLINIC | Age: 38
Discharge: HOME | End: 2018-11-25
Attending: NURSE PRACTITIONER | Admitting: NURSE PRACTITIONER
Payer: COMMERCIAL

## 2018-11-25 VITALS
SYSTOLIC BLOOD PRESSURE: 171 MMHG | BODY MASS INDEX: 29.48 KG/M2 | HEIGHT: 61 IN | RESPIRATION RATE: 14 BRPM | DIASTOLIC BLOOD PRESSURE: 96 MMHG | OXYGEN SATURATION: 98 % | TEMPERATURE: 98.1 F | HEART RATE: 78 BPM

## 2018-11-25 DIAGNOSIS — J02.9 ACUTE PHARYNGITIS, UNSPECIFIED ETIOLOGY: ICD-10-CM

## 2018-11-25 LAB
INTERNAL QC OK POCT: YES
S PYO AG THROAT QL IA.RAPID: NEGATIVE

## 2018-11-25 PROCEDURE — 99214 OFFICE O/P EST MOD 30 MIN: CPT | Mod: Z6 | Performed by: NURSE PRACTITIONER

## 2018-11-25 PROCEDURE — G0463 HOSPITAL OUTPT CLINIC VISIT: HCPCS | Performed by: NURSE PRACTITIONER

## 2018-11-25 PROCEDURE — 87880 STREP A ASSAY W/OPTIC: CPT | Performed by: NURSE PRACTITIONER

## 2018-11-25 RX ORDER — PENICILLIN V POTASSIUM 500 MG/1
500 TABLET, FILM COATED ORAL 2 TIMES DAILY
Qty: 20 TABLET | Refills: 0 | Status: SHIPPED | OUTPATIENT
Start: 2018-11-25 | End: 2018-12-05

## 2018-11-25 NOTE — ED AVS SNAPSHOT
[facility] Department    [address] [address]    Phone:  [phone]    Fax:  [phone]                                       Cecile Lim   MRN: [id number]    Department:  [facility] Emergency Department   Date of Visit:  11/25/2018           After Visit Summary Signature Page     I have received my discharge instructions, and my questions have been answered. I have discussed any challenges I see with this plan with the nurse or doctor.    ..........................................................................................................................................  Patient/Patient Representative Signature      ..........................................................................................................................................  Patient Representative Print Name and Relationship to Patient    ..................................................               ................................................  Date                                   Time    ..........................................................................................................................................  Reviewed by Signature/Title    ...................................................              ..............................................  Date                                               Time          22EPIC Rev 08/18

## 2018-11-25 NOTE — ED AVS SNAPSHOT
[facility] Emergency Department    [address] [facility] [address]    Phone:  [phone]    Fax:  [phone]                                       Cecile Lim   MRN: [id number]    Department:  [facility] Emergency Department   Date of Visit:  11/25/2018           Patient Information     Date Of Birth          1980        Your diagnoses for this visit were:     Acute pharyngitis, unspecified etiology        You were seen by Teena Ortiz APRN CNP.      Follow-up Information     Follow up with Fawn Talley DO.    Specialty:  Internal Medicine    Why:  As needed    Contact information:    [address] [facility] [address]  [phone]          Discharge Instructions         Penicillin 500 mg twice a day for 10 days.  Self-Care for Sore Throats    Sore throats happen for many reasons, such as colds, allergies, and infections caused by viruses or bacteria. In any case, your throat becomes red and sore. Your goal for self-care is to reduce your discomfort while giving your throat a chance to heal.  Moisten and soothe your throat  Tips include the following:    Try a sip of water first thing after waking up.    Keep your throat moist by drinking 6 or more glasses of clear liquids every day.    Run a cool-air humidifier in your room overnight.    Avoid cigarette smoke.     Suck on throat lozenges, cough drops, hard candy, ice chips, or frozen fruit-juice bars. Use the sugar-free versions if your diet or medical condition requires them.  Gargle to ease irritation  Gargling every hour or 2 can ease irritation. Try gargling with 1 of these solutions:    1/4 teaspoon of salt in 1/2 cup of warm water    An over-the-counter anesthetic gargle  Use medicine for more relief  Over-the-counter medicine can reduce sore throat symptoms. Ask your pharmacist if you have questions about which medicine to use:    Ease pain with anesthetic sprays. Aspirin or an aspirin substitute  also helps. Remember, never give aspirin to anyone 18 or younger, or if you are already taking blood thinners.     For sore throats caused by allergies, try antihistamines to block the allergic reaction.    Remember: unless a sore throat is caused by a bacterial infection, antibiotics won t help you.  Prevent future sore throats  Prevention tips include the following:    Stop smoking or reduce contact with secondhand smoke. Smoke irritates the tender throat lining.    Limit contact with pets and with allergy-causing substances, such as pollen and mold.    When you re around someone with a sore throat or cold, wash your hands often to keep viruses or bacteria from spreading.    Don t strain your vocal cords.  Call your healthcare provider  Contact your healthcare provider if you have:    A temperature over 101 F (38.3 C)    White spots on the throat    Great difficulty swallowing    Trouble breathing    A skin rash    Recent exposure to someone else with strep bacteria    Severe hoarseness and swollen glands in the neck or jaw   Date Last Reviewed: 8/1/2016 2000-2018 [employer]. [address] [place] [address]. All rights reserved. This information is not intended as a substitute for professional medical care. Always follow your healthcare professional's instructions.          Your next 10 appointments already scheduled     Nov 29, 2018   Procedure with Rolando Claros DO   [facility] (--)    [address] [place] [address]   [phone]           The medical center is located at [address] [place] [address]. (between [address] in [place], [place] miles north of [place]).            Dec 13, 2018  3:45 PM CST   Return Visit with Rolando Claros DO   [facility]    [address] [place] [address]   [phone]              24 Hour Appointment Hotline       To make an appointment at any  [facility], call [phone] ([phone]). If you don't have a family doctor or clinic, we will help you find one. [facility] are conveniently located to serve the needs of you and your family.             Review of your medicines      START taking        Dose / Directions Last dose taken    penicillin V potassium 500 MG tablet   Commonly known as:  VEETID   Dose:  500 mg   Quantity:  20 tablet        Take 1 tablet (500 mg) by mouth 2 times daily for 10 days   Refills:  0          Our records show that you are taking the medicines listed below. If these are incorrect, please call your family doctor or clinic.        Dose / Directions Last dose taken    lisinopril 10 MG tablet   Commonly known as:  PRINIVIL/ZESTRIL   Dose:  10 mg   Quantity:  90 tablet        Take 1 tablet (10 mg) by mouth daily   Refills:  3        methylphenidate 10 MG tablet   Commonly known as:  RITALIN   Quantity:  60 tablet        Take 1/2 - 1 tablet by mouth twice daily as needed.   Refills:  0        * venlafaxine 37.5 MG 24 hr capsule   Commonly known as:  EFFEXOR-XR   Quantity:  90 capsule        Take with 75 mg for total of 112.5 mg once daily   Refills:  3        * venlafaxine 75 MG 24 hr capsule   Commonly known as:  EFFEXOR-XR   Dose:  75 mg   Indication:  increasing about once per week to 150 mb   Quantity:  90 capsule        Take 1 capsule (75 mg) by mouth daily   Refills:  3        * Notice:  This list has 2 medication(s) that are the same as other medications prescribed for you. Read the directions carefully, and ask your doctor or other care provider to review them with you.            Prescriptions were sent or printed at these locations (1 Prescription)                   [facility] [address] [place] [address]   [address] [place] [address]    Telephone:  [phone]   Fax:  [phone]   Hours:                  E-Prescribed (1 of 1)         penicillin V potassium (VEETID) 500 MG  tablet                Procedures and tests performed during your visit     Rapid strep group A screen POCT      Orders Needing Specimen Collection     Ordered          11/25/18 1938  Beta strep group A r/o culture - ROUTINE, Prio: Routine, Status: Sent     Scheduled Task Status   11/25/18 1939 [employer] [facility] Reminder: Open   Order Class:  PCU Collect                  Pending Results     No orders found from 11/23/2018 to 11/26/2018.            Pending Culture Results     No orders found from 11/23/2018 to 11/26/2018.            Pending Results Instructions     If you had any lab results that were not finalized at the time of your Discharge, you can call the ED Lab Result RN at [phone]. You will be contacted by this team for any positive Lab results or changes in treatment. The nurses are available 7 days a week from 10A to 6:30P.  You can leave a message 24 hours per day and they will return your call.        Test Results From Your Hospital Stay        11/25/2018  7:38 PM      Component Results     Component Value Ref Range & Units Status    Rapid Strep A Screen negative neg Final    Internal QC OK Yes  Final                Thank you for choosing [facility]       Thank you for choosing [facility] for your care. Our goal is always to provide you with excellent care. Hearing back from our patients is one way we can continue to improve our services. Please take a few minutes to complete the written survey that you may receive in the mail after you visit with us. Thank you!        [employer] Information     [employer] gives you secure access to your electronic health record. If you see a primary care provider, you can also send messages to your care team and make appointments. If you have questions, please call your primary care clinic.  If you do not have a primary care provider, please call [phone] and they will assist you.        Care EveryWhere ID     This is your Care EveryWhere ID. This could be used by other  organizations to access your [place] medical records  [id number]        Equal Access to Services     CRAYL JON : Shala Camargo, santo cool, krista hamilton. So [facility] [phone].    ATENCIÓN: Si habla español, tiene a everett disposición servicios gratuitos de asistencia lingüística. Llame al [phone].    We comply with applicable federal civil rights laws and [place] laws. We do not discriminate on the basis of race, color, national origin, age, disability, sex, sexual orientation, or gender identity.            After Visit Summary       This is your record. Keep this with you and show to your community pharmacist(s) and doctor(s) at your next visit.

## 2018-11-26 NOTE — ED PROVIDER NOTES
History     Chief Complaint   Patient presents with     Pharyngitis     HPI  Cecile Lim is a 37 year old female who presents to urgent care for evaluation of sore throat.  Symptoms started 3 days ago.  Denies fever.  Denies cough or nasal congestion.  Denies ear pain.  Denies nausea or vomiting.  Exposed to her daughter who was diagnosed with strep throat on Monday.    Problem List:    Patient Active Problem List    Diagnosis Date Noted     Encounter for surveillance of contraceptives 03/10/2016     Priority: Medium      has had vasectomy       Lipoma of back 11/12/2015     Priority: Medium     Major depressive disorder, recurrent episode, in partial remission (HCC) 12/23/2014     Priority: Medium     Essential hypertension 09/18/2012     Priority: Medium     Stress incontinence, female 02/27/2012     Priority: Medium     CARDIOVASCULAR SCREENING; LDL GOAL LESS THAN 160 10/31/2010     Priority: Medium     Generalized anxiety disorder 10/20/2010     Priority: Medium     Diagnosis updated by automated process. Provider to review and confirm.       Ureteral stone 07/09/2009     Priority: Medium        Past Medical History:    Past Medical History:   Diagnosis Date     LAURA (generalised anxiety disorder)      HTN (hypertension)      Suicide attempt (H) 11/2014       Past Surgical History:    Past Surgical History:   Procedure Laterality Date     NO HISTORY OF SURGERY  1/2007     SLING TRANSVAGINAL N/A 4/27/2018    Procedure: SLING TRANSVAGINAL;  Pubovaginal sling ([place]);  Surgeon: Douglas Hopkins MD;  Location: [facility] OR       Family History:    Family History   Problem Relation Age of Onset     Hypertension Mother      Musculoskeletal Disorder Mother      degenerative disc disease     Thyroid Disease Mother      Depression Mother      Cerebrovascular Disease Mother      Hypertension Father      Cerebrovascular Disease Father      age 40     Hypertension Maternal Grandfather      Cardiovascular  "Maternal Grandfather      Cancer Paternal Grandfather      lung     Asthma No family hx of      C.A.D. No family hx of      Diabetes No family hx of      Breast Cancer No family hx of      Cancer - colorectal No family hx of      Prostate Cancer No family hx of        Social History:  Marital Status:   [2]  Social History   Substance Use Topics     Smoking status: Never Smoker     Smokeless tobacco: Never Used     Alcohol use 0.0 oz/week     0 Standard drinks or equivalent per week      Comment: Rare. Maybe one drink a month        Medications:      penicillin V potassium (VEETID) 500 MG tablet   lisinopril (PRINIVIL/ZESTRIL) 10 MG tablet   methylphenidate (RITALIN) 10 MG tablet   venlafaxine (EFFEXOR-XR) 37.5 MG 24 hr capsule   venlafaxine (EFFEXOR-XR) 75 MG 24 hr capsule         Review of Systems  As mentioned above in the history present illness. All other systems were reviewed and are negative.    Physical Exam   BP: (!) 171/96  Pulse: 78  Temp: 98.1  F (36.7  C)  Resp: 14  Height: 154.9 cm (5' 1\")  SpO2: 98 %      Physical Exam  GENERAL APPEARANCE: healthy, alert and no acute distress  EYES: EOMI,  PERRL, conjunctiva clear  HENT: ear canals and TM's normal.  Nose normal.  Posterior oropharynx erythema without exudate.  Uvula is midline.  No unilateral peritonsillar swelling.  NECK: supple, nontender, anterior cervical lymphadenopathy  RESP: lungs clear to auscultation - no rales, rhonchi or wheezes  CV: regular rates and rhythm, normal S1 S2, no murmur noted    ED Course     ED Course     Procedures             Results for orders placed or performed during the hospital encounter of 11/25/18 (from the past 24 hour(s))   Rapid strep group A screen POCT   Result Value Ref Range    Rapid Strep A Screen negative neg    Internal QC OK Yes        Medications - No data to display    Assessments & Plan (with Medical Decision Making)   RST negative.  However patient's history and exam are clinically consistent " with a group A strep pharyngitis.  Given her exposure to her daughter.  Patient is having a surgical procedure on Thursday.  Patient will be treated with antibiotics for probable strep throat.  Prescription for penicillin was provided to the patient.  No evidence of peritonsillar cellulitis or abscess.   I have low concern for mono at this time, however I did discuss risk/benefits of testing and patient and family agreed to defer at this time.  He was instructed to continue OTC symptomatic treatment.  Follow up with PCP if no improvement in 5-7 days.  Worrisome reasons to seek care sooner discussed.      I have reviewed the nursing notes.    I have reviewed the findings, diagnosis, plan and need for follow up with the patient.      New Prescriptions    PENICILLIN V POTASSIUM (VEETID) 500 MG TABLET    Take 1 tablet (500 mg) by mouth 2 times daily for 10 days       Final diagnoses:   Acute pharyngitis, unspecified etiology       11/25/2018   [facility] EMERGENCY DEPARTMENT     Teena Ortiz APRN CNP  11/25/18 1952

## 2018-11-26 NOTE — DISCHARGE INSTRUCTIONS
Penicillin 500 mg twice a day for 10 days.  Self-Care for Sore Throats    Sore throats happen for many reasons, such as colds, allergies, and infections caused by viruses or bacteria. In any case, your throat becomes red and sore. Your goal for self-care is to reduce your discomfort while giving your throat a chance to heal.  Moisten and soothe your throat  Tips include the following:    Try a sip of water first thing after waking up.    Keep your throat moist by drinking 6 or more glasses of clear liquids every day.    Run a cool-air humidifier in your room overnight.    Avoid cigarette smoke.     Suck on throat lozenges, cough drops, hard candy, ice chips, or frozen fruit-juice bars. Use the sugar-free versions if your diet or medical condition requires them.  Gargle to ease irritation  Gargling every hour or 2 can ease irritation. Try gargling with 1 of these solutions:    1/4 teaspoon of salt in 1/2 cup of warm water    An over-the-counter anesthetic gargle  Use medicine for more relief  Over-the-counter medicine can reduce sore throat symptoms. Ask your pharmacist if you have questions about which medicine to use:    Ease pain with anesthetic sprays. Aspirin or an aspirin substitute also helps. Remember, never give aspirin to anyone 18 or younger, or if you are already taking blood thinners.     For sore throats caused by allergies, try antihistamines to block the allergic reaction.    Remember: unless a sore throat is caused by a bacterial infection, antibiotics won t help you.  Prevent future sore throats  Prevention tips include the following:    Stop smoking or reduce contact with secondhand smoke. Smoke irritates the tender throat lining.    Limit contact with pets and with allergy-causing substances, such as pollen and mold.    When you re around someone with a sore throat or cold, wash your hands often to keep viruses or bacteria from spreading.    Don t strain your vocal cords.  Call your healthcare  provider  Contact your healthcare provider if you have:    A temperature over 101 F (38.3 C)    White spots on the throat    Great difficulty swallowing    Trouble breathing    A skin rash    Recent exposure to someone else with strep bacteria    Severe hoarseness and swollen glands in the neck or jaw   Date Last Reviewed: 8/1/2016 2000-2018 [employer]. [address] [place] [address]. All rights reserved. This information is not intended as a substitute for professional medical care. Always follow your healthcare professional's instructions.

## 2018-11-28 ENCOUNTER — ANESTHESIA EVENT (OUTPATIENT)
Dept: SURGERY | Facility: CLINIC | Age: 38
End: 2018-11-28
Payer: COMMERCIAL

## 2018-11-28 NOTE — ANESTHESIA PREPROCEDURE EVALUATION
Anesthesia Evaluation     . Pt has had prior anesthetic. Type: General           ROS/MED HX    ENT/Pulmonary: Comment: Negative rapid strep on 11-25-18, culture negative.    (+)tobacco use, , . .    Neurologic:  - neg neurologic ROS     Cardiovascular:     (+) hypertension----. : . . . :. .       METS/Exercise Tolerance:  >4 METS   Hematologic:  - neg hematologic  ROS       Musculoskeletal:   (+) , , other musculoskeletal- back mass      GI/Hepatic:  - neg GI/hepatic ROS      (-) liver disease   Renal/Genitourinary: Comment: Ureteral stone    (+) Nephrolithiasis , Other Renal/ Genitourinary, stress incontinence      Endo:     (+) Obesity, .      Psychiatric:     (+) psychiatric history other (comment), anxiety and depression (suicidal attempt, dysthymia)      Infectious Disease:  - neg infectious disease ROS       Malignancy:      - no malignancy   Other:    - neg other ROS                 Physical Exam  Normal systems: cardiovascular, pulmonary and dental    Airway   Mallampati: II    Dental     Cardiovascular       Pulmonary                     Anesthesia Plan      History & Physical Review  History and physical reviewed and following examination; no interval change.    ASA Status:  2 .    NPO Status:  > 6 hours    Plan for MAC Reason for MAC:  Deep or markedly invasive procedure (G8)  PONV prophylaxis:  Ondansetron (or other 5HT-3) and Dexamethasone or Solumedrol  Agrees to MAC,  OK  With general if necessary.      Postoperative Care      Consents  Anesthetic plan, risks, benefits and alternatives discussed with:  Patient..                          .

## 2018-11-29 ENCOUNTER — ANESTHESIA (OUTPATIENT)
Dept: SURGERY | Facility: CLINIC | Age: 38
End: 2018-11-29
Payer: COMMERCIAL

## 2018-11-29 ENCOUNTER — HOSPITAL ENCOUNTER (OUTPATIENT)
Facility: CLINIC | Age: 38
Discharge: HOME | End: 2018-11-29
Attending: SURGERY | Admitting: SURGERY
Payer: COMMERCIAL

## 2018-11-29 ENCOUNTER — SURGERY (OUTPATIENT)
Age: 38
End: 2018-11-29

## 2018-11-29 VITALS
DIASTOLIC BLOOD PRESSURE: 71 MMHG | WEIGHT: 158 LBS | BODY MASS INDEX: 29.83 KG/M2 | OXYGEN SATURATION: 97 % | SYSTOLIC BLOOD PRESSURE: 111 MMHG | RESPIRATION RATE: 16 BRPM | HEIGHT: 61 IN | TEMPERATURE: 98.7 F

## 2018-11-29 DIAGNOSIS — R22.2 MASS ON BACK: Primary | ICD-10-CM

## 2018-11-29 LAB — HCG UR QL: NEGATIVE

## 2018-11-29 PROCEDURE — 25000128 H RX IP 250 OP 636: Performed by: NURSE ANESTHETIST, CERTIFIED REGISTERED

## 2018-11-29 PROCEDURE — 40000305 ZZH STATISTIC PRE PROC ASSESS I: Performed by: SURGERY

## 2018-11-29 PROCEDURE — 37000009 ZZH ANESTHESIA TECHNICAL FEE, EACH ADDTL 15 MIN: Performed by: SURGERY

## 2018-11-29 PROCEDURE — 27210794 ZZH OR GENERAL SUPPLY STERILE: Performed by: SURGERY

## 2018-11-29 PROCEDURE — 71000027 ZZH RECOVERY PHASE 2 EACH 15 MINS: Performed by: SURGERY

## 2018-11-29 PROCEDURE — 88304 TISSUE EXAM BY PATHOLOGIST: CPT | Mod: 26 | Performed by: SURGERY

## 2018-11-29 PROCEDURE — 36000050 ZZH SURGERY LEVEL 2 1ST 30 MIN: Performed by: SURGERY

## 2018-11-29 PROCEDURE — 25000125 ZZHC RX 250: Performed by: SURGERY

## 2018-11-29 PROCEDURE — 81025 URINE PREGNANCY TEST: CPT | Performed by: NURSE ANESTHETIST, CERTIFIED REGISTERED

## 2018-11-29 PROCEDURE — 25000125 ZZHC RX 250: Performed by: NURSE ANESTHETIST, CERTIFIED REGISTERED

## 2018-11-29 PROCEDURE — 36000052 ZZH SURGERY LEVEL 2 EA 15 ADDTL MIN: Performed by: SURGERY

## 2018-11-29 PROCEDURE — 37000008 ZZH ANESTHESIA TECHNICAL FEE, 1ST 30 MIN: Performed by: SURGERY

## 2018-11-29 PROCEDURE — 25000128 H RX IP 250 OP 636: Performed by: SURGERY

## 2018-11-29 PROCEDURE — 88304 TISSUE EXAM BY PATHOLOGIST: CPT | Performed by: SURGERY

## 2018-11-29 PROCEDURE — 21931 EXC BACK LES SC 3 CM/>: CPT | Performed by: SURGERY

## 2018-11-29 RX ORDER — SODIUM CHLORIDE, SODIUM LACTATE, POTASSIUM CHLORIDE, CALCIUM CHLORIDE 600; 310; 30; 20 MG/100ML; MG/100ML; MG/100ML; MG/100ML
INJECTION, SOLUTION INTRAVENOUS CONTINUOUS
Status: DISCONTINUED | OUTPATIENT
Start: 2018-11-29 | End: 2018-11-29 | Stop reason: HOSPADM

## 2018-11-29 RX ORDER — FENTANYL CITRATE 50 UG/ML
INJECTION, SOLUTION INTRAMUSCULAR; INTRAVENOUS PRN
Status: DISCONTINUED | OUTPATIENT
Start: 2018-11-29 | End: 2018-11-29

## 2018-11-29 RX ORDER — DEXAMETHASONE SODIUM PHOSPHATE 4 MG/ML
INJECTION, SOLUTION INTRA-ARTICULAR; INTRALESIONAL; INTRAMUSCULAR; INTRAVENOUS; SOFT TISSUE PRN
Status: DISCONTINUED | OUTPATIENT
Start: 2018-11-29 | End: 2018-11-29

## 2018-11-29 RX ORDER — PROPOFOL 10 MG/ML
INJECTION, EMULSION INTRAVENOUS CONTINUOUS PRN
Status: DISCONTINUED | OUTPATIENT
Start: 2018-11-29 | End: 2018-11-29

## 2018-11-29 RX ORDER — CEFAZOLIN SODIUM 2 G/100ML
2 INJECTION, SOLUTION INTRAVENOUS
Status: DISCONTINUED | OUTPATIENT
Start: 2018-11-29 | End: 2018-11-29 | Stop reason: HOSPADM

## 2018-11-29 RX ORDER — FENTANYL CITRATE 50 UG/ML
25-50 INJECTION, SOLUTION INTRAMUSCULAR; INTRAVENOUS
Status: CANCELLED | OUTPATIENT
Start: 2018-11-29

## 2018-11-29 RX ORDER — ACETAMINOPHEN 325 MG/1
650 TABLET ORAL
Status: CANCELLED | OUTPATIENT
Start: 2018-11-29

## 2018-11-29 RX ORDER — MEPERIDINE HYDROCHLORIDE 25 MG/ML
12.5 INJECTION INTRAMUSCULAR; INTRAVENOUS; SUBCUTANEOUS
Status: DISCONTINUED | OUTPATIENT
Start: 2018-11-29 | End: 2018-11-29 | Stop reason: HOSPADM

## 2018-11-29 RX ORDER — BUPIVACAINE HYDROCHLORIDE 5 MG/ML
INJECTION, SOLUTION PERINEURAL PRN
Status: DISCONTINUED | OUTPATIENT
Start: 2018-11-29 | End: 2018-11-29 | Stop reason: HOSPADM

## 2018-11-29 RX ORDER — LIDOCAINE 40 MG/G
CREAM TOPICAL
Status: DISCONTINUED | OUTPATIENT
Start: 2018-11-29 | End: 2018-11-29 | Stop reason: HOSPADM

## 2018-11-29 RX ORDER — NALOXONE HYDROCHLORIDE 0.4 MG/ML
.1-.4 INJECTION, SOLUTION INTRAMUSCULAR; INTRAVENOUS; SUBCUTANEOUS
Status: DISCONTINUED | OUTPATIENT
Start: 2018-11-29 | End: 2018-11-29 | Stop reason: HOSPADM

## 2018-11-29 RX ORDER — HYDROCODONE BITARTRATE AND ACETAMINOPHEN 5; 325 MG/1; MG/1
1-2 TABLET ORAL EVERY 4 HOURS PRN
Qty: 20 TABLET | Refills: 0 | Status: ON HOLD | OUTPATIENT
Start: 2018-11-29 | End: 2018-12-07

## 2018-11-29 RX ORDER — CEFAZOLIN SODIUM 1 G/50ML
1 INJECTION, SOLUTION INTRAVENOUS SEE ADMIN INSTRUCTIONS
Status: DISCONTINUED | OUTPATIENT
Start: 2018-11-29 | End: 2018-11-29 | Stop reason: HOSPADM

## 2018-11-29 RX ORDER — IBUPROFEN 600 MG/1
600 TABLET, FILM COATED ORAL EVERY 6 HOURS PRN
Qty: 30 TABLET | Refills: 0 | COMMUNITY
Start: 2018-11-29 | End: 2020-12-07

## 2018-11-29 RX ORDER — FENTANYL CITRATE 50 UG/ML
25-50 INJECTION, SOLUTION INTRAMUSCULAR; INTRAVENOUS
Status: DISCONTINUED | OUTPATIENT
Start: 2018-11-29 | End: 2018-11-29 | Stop reason: HOSPADM

## 2018-11-29 RX ORDER — LIDOCAINE HYDROCHLORIDE AND EPINEPHRINE 10; 10 MG/ML; UG/ML
INJECTION, SOLUTION INFILTRATION; PERINEURAL PRN
Status: DISCONTINUED | OUTPATIENT
Start: 2018-11-29 | End: 2018-11-29 | Stop reason: HOSPADM

## 2018-11-29 RX ORDER — OXYCODONE HYDROCHLORIDE 5 MG/1
5 TABLET ORAL
Status: CANCELLED | OUTPATIENT
Start: 2018-11-29

## 2018-11-29 RX ORDER — ONDANSETRON 4 MG/1
4 TABLET, ORALLY DISINTEGRATING ORAL EVERY 30 MIN PRN
Status: DISCONTINUED | OUTPATIENT
Start: 2018-11-29 | End: 2018-11-29 | Stop reason: HOSPADM

## 2018-11-29 RX ORDER — HYDROMORPHONE HYDROCHLORIDE 1 MG/ML
.3-.5 INJECTION, SOLUTION INTRAMUSCULAR; INTRAVENOUS; SUBCUTANEOUS EVERY 10 MIN PRN
Status: DISCONTINUED | OUTPATIENT
Start: 2018-11-29 | End: 2018-11-29 | Stop reason: HOSPADM

## 2018-11-29 RX ORDER — ONDANSETRON 2 MG/ML
INJECTION INTRAMUSCULAR; INTRAVENOUS PRN
Status: DISCONTINUED | OUTPATIENT
Start: 2018-11-29 | End: 2018-11-29

## 2018-11-29 RX ORDER — ONDANSETRON 2 MG/ML
4 INJECTION INTRAMUSCULAR; INTRAVENOUS EVERY 30 MIN PRN
Status: DISCONTINUED | OUTPATIENT
Start: 2018-11-29 | End: 2018-11-29 | Stop reason: HOSPADM

## 2018-11-29 RX ADMIN — BUPIVACAINE HYDROCHLORIDE 15 ML: 5 INJECTION, SOLUTION PERINEURAL at 09:04

## 2018-11-29 RX ADMIN — LIDOCAINE HYDROCHLORIDE 1 ML: 10 INJECTION, SOLUTION EPIDURAL; INFILTRATION; INTRACAUDAL; PERINEURAL at 08:19

## 2018-11-29 RX ADMIN — ONDANSETRON 4 MG: 2 INJECTION INTRAMUSCULAR; INTRAVENOUS at 08:35

## 2018-11-29 RX ADMIN — CEFAZOLIN SODIUM 2 G: 1 INJECTION, SOLUTION INTRAVENOUS at 08:30

## 2018-11-29 RX ADMIN — FENTANYL CITRATE 100 MCG: 50 INJECTION, SOLUTION INTRAMUSCULAR; INTRAVENOUS at 08:30

## 2018-11-29 RX ADMIN — PROPOFOL 75 MCG/KG/MIN: 10 INJECTION, EMULSION INTRAVENOUS at 08:38

## 2018-11-29 RX ADMIN — SODIUM CHLORIDE, POTASSIUM CHLORIDE, SODIUM LACTATE AND CALCIUM CHLORIDE: 600; 310; 30; 20 INJECTION, SOLUTION INTRAVENOUS at 08:18

## 2018-11-29 RX ADMIN — DEXAMETHASONE SODIUM PHOSPHATE 4 MG: 4 INJECTION, SOLUTION INTRA-ARTICULAR; INTRALESIONAL; INTRAMUSCULAR; INTRAVENOUS; SOFT TISSUE at 08:35

## 2018-11-29 RX ADMIN — MIDAZOLAM 2 MG: 1 INJECTION INTRAMUSCULAR; INTRAVENOUS at 08:30

## 2018-11-29 RX ADMIN — LIDOCAINE HYDROCHLORIDE AND EPINEPHRINE 15 ML: 10; 10 INJECTION, SOLUTION INFILTRATION; PERINEURAL at 09:04

## 2018-11-29 ASSESSMENT — LIFESTYLE VARIABLES: TOBACCO_USE: 1

## 2018-11-29 NOTE — IP AVS SNAPSHOT
[facility] PreOP/Phase II    [address] [facility] [address]    Phone:  [phone]    Fax:  [phone]                                       After Visit Summary   11/29/2018    Cecile Lim    MRN: [id number]           After Visit Summary Signature Page     I have received my discharge instructions, and my questions have been answered. I have discussed any challenges I see with this plan with the nurse or doctor.    ..........................................................................................................................................  Patient/Patient Representative Signature      ..........................................................................................................................................  Patient Representative Print Name and Relationship to Patient    ..................................................               ................................................  Date                                   Time    ..........................................................................................................................................  Reviewed by Signature/Title    ...................................................              ..............................................  Date                                               Time          22EPIC Rev 08/18

## 2018-11-29 NOTE — DISCHARGE INSTRUCTIONS
Same Day Surgery Discharge Instructions  Special Precautions After Surgery - Adult    1. It is not unusual to feel lightheaded or faint, up to 24 hours after surgery or while taking pain medication.  If you have these symptoms; sit for a few minutes before standing and have someone assist you when getting up.  2. You should rest and relax for the next 24 hours and must have someone stay with you for at least 24 hours after your discharge.  3. DO NOT DRIVE any vehicle or operate mechanical equipment for 24 hours following the end of your surgery.  DO NOT DRIVE while taking narcotic pain medications that have been prescribed by your physician.  If you had a limb operated on, you must be able to use it fully to drive.  4. DO NOT drink alcoholic beverages for 24 hours following surgery or while taking prescription pain medication.  5. Drink clear liquids (apple juice, ginger ale, broth, 7-Up, etc.).  Progress to your regular diet as you feel able.  6. Any questions call your physician and do not make important decisions for 24 hours.    ACTIVITY  ? Follow  's instructions.     Keep appointment to return to the clinic.  __________________________________________________________________________________________________________________________________  IMPORTANT NUMBERS:    [facility] [place] Main Number:  [phone], [phone]  Pharmacy:  [phone]  Same Day Surgery:  [phone], Monday - Friday until 8:30 p.m.  Urgent Care:  [phone]  Emergency Room:  [phone]      [facility]:  [phone]                                                                             [facility]:  [phone] option 1  [facility]:  [phone]     [facility]:  [phone]   [facility]:  [phone]   Home Medical Equipment: [phone]  [facility]:  [phone]    HOME CARE FOLLOWING MASS EXCISION      DIET:  No restrictions.  Increased  "fluid intake is recommended. While taking pain medications, increase dietary fiber or add a fiber supplementation like Metamucil or Citrucel to help prevent constipation - a possible side effect of pain medications.    NAUSEA:  If nauseated from the anesthetic/pain meds; rest in bed, get up cautiously with assistance, and drink clear liquids (juice, tea, broth).    ACTIVITY:  Light Activity -- you may immediately be up and about as tolerated.  Driving -- you may drive when comfortable and off pain medications.  Light Work -- resume when comfortable off pain medications.  (If you can drive, you probably can work.)  Resume Regular Activity As tolerated    INCISIONAL CARE:    If you have a dressing in place, keep clean and dry for 24 hours after surgery.  After this timeframe, you may replace the gauze daily if it becomes soiled.    You may remove the dressing and shower 48 hours after surgery.  Do not submerse incision in water for 1 week.    If you have a Dermabond dressing (a type of skin glue), you may shower immediately.    Sutures will absorb and need not be removed.    If present, leave the steri-strips (white paper tapes) in place for 14 days after surgery.    If present, leave Dermabond glue in place until it wears/flakes off.    Expect a variable amount of swelling/bruising/discoloration that may appear around or below the repair site.    Some numbness around the incision is common.    A lump/\"healing ridge\" under the incision is normal and will gradually resolve over the following 1-2 months.    DISCOMFORT:  Local anesthetic placed at surgery should provide relief for 4-8 hours.  Begin taking pain pills before discomfort is severe.  Take the pain medication with some food, when possible, to minimize side effects.  Intermittent use of ice packs to the hernia repair site may help during the first 1-3 weeks after surgery.  Expect gradual improvement.    Over-the-counter anti-inflammatory medications (i.e. " Ibuprofen/Advil/Motrin or Naprosyn/Aleve) may be used per package instructions in addition to or while tapering off the narcotic pain medications to decrease swelling and sensitivity at the repair site.  DO NOT TAKE these Anti-inflammatory medications if your primary physician has advised against doing so, or if you have acid reflux, ulcer, or bleeding disorder, or take blood-thinner medications.  Call your primary physician or the surgery office if you have medication questions.      RETURN APPOINTMENT:  Schedule a follow-up visit 2 weeks post-op.  Office Phone:  [phone]     CONTACT US IF THE FOLLOWING DEVELOPS:   1. A fever that is above 101     2. If there is a large amount of drainage, bleeding, or swelling.   3. Severe pain that is not relieved by your prescription.   4. Drainage that is thick, cloudy, yellow, green or white.   5. Any other questions not answered by  Frequently Asked Questions  sheet.      FREQUENTLY ASKED QUESTIONS:    Q:  How should my incision look?    A:  Normally your incision will appear slightly swollen with light redness directly along the incision itself as it heals.  It may feel like a bump or ridge as the healing/scarring happens, and over time (3-4 months) this bump or ridge feeling should slowly go away.  In general, clear or pink watery drainage can be normal at first as your incision heals, but should decrease over time.    Q:  How do I know if my incision is infected?  A:  Look at your incision for signs of infection, like redness around the incision spreading to surrounding skin, or drainage of cloudy or foul-smelling drainage.  If you feel warm, check your temperature to see if you are running a fever.    **If any of these things occur, please notify the nurse at our office.  We may need you to come into the office for an incision check.      Q:  How do I take care of my incision?  A:  If you have a dressing in place - Starting the day after surgery, replace the dressing  1-2 times a day until there is no further drainage from the incision.  At that time, a dressing is no longer needed.  Try to minimize tape on the skin if irritation is occurring at the tape sites.  If you have significant irritation from tape on the skin, please call the office to discuss other method of dressing your incision.    Small pieces of tape called  steri-strips  may be present directly overlying your incision; these may be removed 10 days after surgery unless otherwise specified by your surgeon.  If these tapes start to loosen at the ends, you may trim them back until they fall off or are removed.    A:  If you had  Dermabond  tissue glue used as a dressing (this causes your incision to look shiny with a clear covering over it) - This type of dressing wears off with time and does not require more dressings over the top unless it is draining around the glue as it wears off.  Do not apply ointments or lotions over the incisions until the glue has completely worn off.    Q:  There is a piece of tape or a sticky  lead  still on my skin.  Can I remove this?  A:  Sometimes the sticky  leads  used for monitoring during surgery or for evaluation in the emergency department are not all removed while you are in the hospital.  These sometimes have a tab or metal dot on them.  You can easily remove these on your own, like taking off a band-aid.  If there is a gel substance under the  lead , simply wipe/clean it off with a washcloth or paper towel.      Q:  What can I do to minimize constipation (very hard stools, or lack of stools)?  A:  Stay well hydrated.  Increase your dietary fiber intake or take a fiber supplement -with plenty of water.  Walk around frequently.  You may consider an over-the-counter stool-softener.  Your Pharmacist can assist you with choosing one that is stocked at your pharmacy.  Constipation is also one of the most common side effects of pain medication.  If you are using pain medication, be  pro-active and try to PREVENT problems with constipation by taking the steps above BEFORE constipation becomes a problem.    Q:  What do I do if I need more pain medications?  A:  Call the office to receive refills.  Be aware that certain pain meds cannot be called into a pharmacy and actually require a paper prescription.  A change may be made in your pain med as you progress thru your recovery period or if you have side effects to certain meds.    --Pain meds are NOT refilled after 5pm on weekdays, and NOT AT ALL on the weekends, so please look ahead to prevent problems.      Q:  Why am I having a hard time sleeping now that I am at home?  A:  Many medications you receive while you are in the hospital can impact your sleep for a number of days after your surgery/hospitalization.  Decreased level of activity and naps during the day may also make sleeping at night difficult.  Try to minimize day-time naps, and get up frequently during the day to walk around your home during your recovery time.  Sleep aides may be of some help, but are not recommended for long-term use.      Q:  I am having some back discomfort.  What should I do?  A:  This may be related to certain positioning that was required for your surgery, extended periods of time in bed, or other changes in your overall activity level.  You may try ice, heat, acetaminophen, or ibuprofen to treat this temporarily.  Note that many pain medications have acetaminophen in them and would state this on the prescription bottle.  Be sure not to exceed the maximum of 4000mg per day of acetaminophen.     **If the pain you are having does not resolve, is severe, or is a flare of back pain you have had on other occasions prior to surgery, please contact your primary physician for further recommendations or for an appointment to be examined at their office.    Q:  Why am I having headaches?  A:  Headaches can be caused by many things:  caffeine withdrawal, use of pain  meds, dehydration, high blood pressure, lack of sleep, over-activity/exhaustion, flare-up of usual migraine headaches.  If you feel this is related to muscle tension (a band-like feeling around the head, or a pressure at the low-back of the head) you may try ice or heat to this area.  You may need to drink more fluids (try electrolyte drink like Gatorade), rest, or take your usual migraine medications.   **If your headaches do not resolve, worsen, are accompanied by other symptoms, or if your blood pressure is high, please call your primary physician for recommendation and/or examination.    Q:  I am unable to urinate.  What do I do?  A:  A small percentage of people can have difficulty urinating initially after surgery.  This includes being able to urinate only a very small amount at a time and feeling discomfort or pressure in the very low abdomen.  This is called  urinary retention , and is actually an urgent situation.  Proceed to your nearest Emergency department for evaluation (not an Urgent Care Center).  Sometimes the bladder does not work correctly after certain medications you receive during surgery, or related to certain procedures.  You may need to have a catheter placed until your bladder recovers.  When planning to go to an Emergency department, it may help to call the ER to let them know you are coming in for this problem after a surgery.  This may help you get in quicker to be evaluated.  **If you have symptoms of a urinary tract infection, please contact your primary physician for the proper evaluation and treatment.          If you have other questions, please call the office Monday thru Friday between 8am and 5pm to discuss with the nurse.  #[phone]    There is a surgeon ON CALL on weekday evenings and over the weekend in case of urgent need only, and may be contacted at the same number.    If you are having an emergency, call 911 or proceed to your nearest emergency  department.

## 2018-11-29 NOTE — ANESTHESIA POSTPROCEDURE EVALUATION
Patient: Cecile Ramírez Strub    Procedure(s):  Excision of Left Back Mass    Diagnosis:Back mass  Diagnosis Additional Information: No value filed.    Anesthesia Type:  MAC    Note:  Anesthesia Post Evaluation    Patient location during evaluation: Phase 2 and Bedside  Patient participation: Able to fully participate in evaluation  Level of consciousness: awake  Pain management: adequate  Airway patency: patent  Cardiovascular status: acceptable  Respiratory status: acceptable  Hydration status: acceptable  PONV: none     Anesthetic complications: None          Last vitals:  Vitals:    11/29/18 0747   BP: 128/81   Resp: 18   Temp: 36.7  C (98.1  F)   SpO2: 97%         Electronically Signed By: Santosh Dougherty CRNA, JOAQUIN EUBANKS  November 29, 2018  9:33 AM

## 2018-11-29 NOTE — ANESTHESIA CARE TRANSFER NOTE
Patient: Cecile Lim    Procedure(s):  Excision of Left Back Mass    Diagnosis: Back mass  Diagnosis Additional Information: No value filed.    Anesthesia Type:   MAC     Note:  Airway :Room Air  Patient transferred to:Phase II  Handoff Report: Identifed the Patient, Identified the Reponsible Provider, Reviewed the pertinent medical history, Discussed the surgical course, Reviewed Intra-OP anesthesia mangement and issues during anesthesia, Set expectations for post-procedure period and Allowed opportunity for questions and acknowledgement of understanding      Vitals: (Last set prior to Anesthesia Care Transfer)    CRNA VITALS  11/29/2018 0857 - 11/29/2018 0933      11/29/2018             Pulse: 88    SpO2: 99 %                Electronically Signed By: Santosh Dougherty CRNA, APRN CRNA  November 29, 2018  9:33 AM

## 2018-11-29 NOTE — IP AVS SNAPSHOT
MRN:[id number]                      After Visit Summary   11/29/2018    Cecile Lim    MRN: [id number]           Thank you!     Thank you for choosing [facility] for your care. Our goal is always to provide you with excellent care. Hearing back from our patients is one way we can continue to improve our services. Please take a few minutes to complete the written survey that you may receive in the mail after you visit with us. Thank you!        Patient Information     Date Of Birth          1980        About your hospital stay     You were admitted on:  November 29, 2018 You last received care in the:  [facility] PreOP/Phase II    You were discharged on:  November 29, 2018       Who to Call     For medical emergencies, please call 911.  For non-urgent questions about your medical care, please call your primary care provider or clinic, [phone]  For questions related to your surgery, please call your surgery clinic        Attending Provider     Provider Rolando Gamino DO Surgery       Primary Care Provider Office Phone # Fax #    Fawn Beatriz DO [place] [phone]      After Care Instructions     Diet Instructions       Resume pre-procedure diet            No Alcohol       For 24 hours post procedure            No driving or operating machinery        until off narcotic pain medications            No lifting        No lifting over 20 lbs and no strenuous physical activity for 2 weeks            Shower       No shower for 24 hours post procedure. May shower tomorrow                  Your next 10 appointments already scheduled     Dec 13, 2018  3:45 PM CST   [facility] with Rolando Claros DO   [facility]    [address] [address]   [phone]              Further instructions from your care team                           Same Day Surgery Discharge Instructions  Special  Precautions After Surgery - Adult    1. It is not unusual to feel lightheaded or faint, up to 24 hours after surgery or while taking pain medication.  If you have these symptoms; sit for a few minutes before standing and have someone assist you when getting up.  2. You should rest and relax for the next 24 hours and must have someone stay with you for at least 24 hours after your discharge.  3. DO NOT DRIVE any vehicle or operate mechanical equipment for 24 hours following the end of your surgery.  DO NOT DRIVE while taking narcotic pain medications that have been prescribed by your physician.  If you had a limb operated on, you must be able to use it fully to drive.  4. DO NOT drink alcoholic beverages for 24 hours following surgery or while taking prescription pain medication.  5. Drink clear liquids (apple juice, ginger ale, broth, 7-Up, etc.).  Progress to your regular diet as you feel able.  6. Any questions call your physician and do not make important decisions for 24 hours.    ACTIVITY  ? Follow  's instructions.     Keep appointment to return to the clinic.  __________________________________________________________________________________________________________________________________  IMPORTANT NUMBERS:    [facility] [place]:  [phone], [phone]  Pharmacy:  [phone]  Same Day Surgery:  [phone], Monday - Friday until 8:30 p.m.  Urgent Care:  [phone]  Emergency Room:  [phone]      [facility]:  [phone]                                                                             [facility]:  [phone] option 1  [facility]:  [phone]     [facility]:  [phone]   Surgery Specialty Clinic:  [phone]   Home Medical Equipment: [phone]  [facility]:  [phone]    HOME CARE FOLLOWING MASS EXCISION      DIET:  No restrictions.  Increased fluid intake is recommended. While taking pain medications, increase  "dietary fiber or add a fiber supplementation like Metamucil or Citrucel to help prevent constipation - a possible side effect of pain medications.    NAUSEA:  If nauseated from the anesthetic/pain meds; rest in bed, get up cautiously with assistance, and drink clear liquids (juice, tea, broth).    ACTIVITY:  Light Activity -- you may immediately be up and about as tolerated.  Driving -- you may drive when comfortable and off pain medications.  Light Work -- resume when comfortable off pain medications.  (If you can drive, you probably can work.)  Resume Regular Activity As tolerated    INCISIONAL CARE:    If you have a dressing in place, keep clean and dry for 24 hours after surgery.  After this timeframe, you may replace the gauze daily if it becomes soiled.    You may remove the dressing and shower 48 hours after surgery.  Do not submerse incision in water for 1 week.    If you have a Dermabond dressing (a type of skin glue), you may shower immediately.    Sutures will absorb and need not be removed.    If present, leave the steri-strips (white paper tapes) in place for 14 days after surgery.    If present, leave Dermabond glue in place until it wears/flakes off.    Expect a variable amount of swelling/bruising/discoloration that may appear around or below the repair site.    Some numbness around the incision is common.    A lump/\"healing ridge\" under the incision is normal and will gradually resolve over the following 1-2 months.    DISCOMFORT:  Local anesthetic placed at surgery should provide relief for 4-8 hours.  Begin taking pain pills before discomfort is severe.  Take the pain medication with some food, when possible, to minimize side effects.  Intermittent use of ice packs to the hernia repair site may help during the first 1-3 weeks after surgery.  Expect gradual improvement.    Over-the-counter anti-inflammatory medications (i.e. Ibuprofen/Advil/Motrin or Naprosyn/Aleve) may be used per package " instructions in addition to or while tapering off the narcotic pain medications to decrease swelling and sensitivity at the repair site.  DO NOT TAKE these Anti-inflammatory medications if your primary physician has advised against doing so, or if you have acid reflux, ulcer, or bleeding disorder, or take blood-thinner medications.  Call your primary physician or the surgery office if you have medication questions.      RETURN APPOINTMENT:  Schedule a follow-up visit 2 weeks post-op.  Office Phone:  [phone]     CONTACT US IF THE FOLLOWING DEVELOPS:   1. A fever that is above 101     2. If there is a large amount of drainage, bleeding, or swelling.   3. Severe pain that is not relieved by your prescription.   4. Drainage that is thick, cloudy, yellow, green or white.   5. Any other questions not answered by  Frequently Asked Questions  sheet.      FREQUENTLY ASKED QUESTIONS:    Q:  How should my incision look?    A:  Normally your incision will appear slightly swollen with light redness directly along the incision itself as it heals.  It may feel like a bump or ridge as the healing/scarring happens, and over time (3-4 months) this bump or ridge feeling should slowly go away.  In general, clear or pink watery drainage can be normal at first as your incision heals, but should decrease over time.    Q:  How do I know if my incision is infected?  A:  Look at your incision for signs of infection, like redness around the incision spreading to surrounding skin, or drainage of cloudy or foul-smelling drainage.  If you feel warm, check your temperature to see if you are running a fever.    **If any of these things occur, please notify the nurse at our office.  We may need you to come into the office for an incision check.      Q:  How do I take care of my incision?  A:  If you have a dressing in place - Starting the day after surgery, replace the dressing 1-2 times a day until there is no further drainage from the  incision.  At that time, a dressing is no longer needed.  Try to minimize tape on the skin if irritation is occurring at the tape sites.  If you have significant irritation from tape on the skin, please call the office to discuss other method of dressing your incision.    Small pieces of tape called  steri-strips  may be present directly overlying your incision; these may be removed 10 days after surgery unless otherwise specified by your surgeon.  If these tapes start to loosen at the ends, you may trim them back until they fall off or are removed.    A:  If you had  Dermabond  tissue glue used as a dressing (this causes your incision to look shiny with a clear covering over it) - This type of dressing wears off with time and does not require more dressings over the top unless it is draining around the glue as it wears off.  Do not apply ointments or lotions over the incisions until the glue has completely worn off.    Q:  There is a piece of tape or a sticky  lead  still on my skin.  Can I remove this?  A:  Sometimes the sticky  leads  used for monitoring during surgery or for evaluation in the emergency department are not all removed while you are in the hospital.  These sometimes have a tab or metal dot on them.  You can easily remove these on your own, like taking off a band-aid.  If there is a gel substance under the  lead , simply wipe/clean it off with a washcloth or paper towel.      Q:  What can I do to minimize constipation (very hard stools, or lack of stools)?  A:  Stay well hydrated.  Increase your dietary fiber intake or take a fiber supplement -with plenty of water.  Walk around frequently.  You may consider an over-the-counter stool-softener.  Your Pharmacist can assist you with choosing one that is stocked at your pharmacy.  Constipation is also one of the most common side effects of pain medication.  If you are using pain medication, be pro-active and try to PREVENT problems with constipation by  taking the steps above BEFORE constipation becomes a problem.    Q:  What do I do if I need more pain medications?  A:  Call the office to receive refills.  Be aware that certain pain meds cannot be called into a pharmacy and actually require a paper prescription.  A change may be made in your pain med as you progress thru your recovery period or if you have side effects to certain meds.    --Pain meds are NOT refilled after 5pm on weekdays, and NOT AT ALL on the weekends, so please look ahead to prevent problems.      Q:  Why am I having a hard time sleeping now that I am at home?  A:  Many medications you receive while you are in the hospital can impact your sleep for a number of days after your surgery/hospitalization.  Decreased level of activity and naps during the day may also make sleeping at night difficult.  Try to minimize day-time naps, and get up frequently during the day to walk around your home during your recovery time.  Sleep aides may be of some help, but are not recommended for long-term use.      Q:  I am having some back discomfort.  What should I do?  A:  This may be related to certain positioning that was required for your surgery, extended periods of time in bed, or other changes in your overall activity level.  You may try ice, heat, acetaminophen, or ibuprofen to treat this temporarily.  Note that many pain medications have acetaminophen in them and would state this on the prescription bottle.  Be sure not to exceed the maximum of 4000mg per day of acetaminophen.     **If the pain you are having does not resolve, is severe, or is a flare of back pain you have had on other occasions prior to surgery, please contact your primary physician for further recommendations or for an appointment to be examined at their office.    Q:  Why am I having headaches?  A:  Headaches can be caused by many things:  caffeine withdrawal, use of pain meds, dehydration, high blood pressure, lack of sleep,  over-activity/exhaustion, flare-up of usual migraine headaches.  If you feel this is related to muscle tension (a band-like feeling around the head, or a pressure at the low-back of the head) you may try ice or heat to this area.  You may need to drink more fluids (try electrolyte drink like Gatorade), rest, or take your usual migraine medications.   **If your headaches do not resolve, worsen, are accompanied by other symptoms, or if your blood pressure is high, please call your primary physician for recommendation and/or examination.    Q:  I am unable to urinate.  What do I do?  A:  A small percentage of people can have difficulty urinating initially after surgery.  This includes being able to urinate only a very small amount at a time and feeling discomfort or pressure in the very low abdomen.  This is called  urinary retention , and is actually an urgent situation.  Proceed to your nearest Emergency department for evaluation (not an Urgent Care Center).  Sometimes the bladder does not work correctly after certain medications you receive during surgery, or related to certain procedures.  You may need to have a catheter placed until your bladder recovers.  When planning to go to an Emergency department, it may help to call the ER to let them know you are coming in for this problem after a surgery.  This may help you get in quicker to be evaluated.  **If you have symptoms of a urinary tract infection, please contact your primary physician for the proper evaluation and treatment.          If you have other questions, please call the office Monday thru Friday between 8am and 5pm to discuss with the nurse.  #[phone]    There is a surgeon ON CALL on weekday evenings and over the weekend in case of urgent need only, and may be contacted at the same number.    If you are having an emergency, call 911 or proceed to your nearest emergency department.                  Pending Results     Date and Time Order Name Status  "Description    11/29/2018 0901 Surgical pathology exam In process             Admission Information     Date & Time Provider Department Dept. Phone    11/29/2018 Rolando Claros DO [facility] PreOP/Phase [phone]      Your Vitals Were     Blood Pressure Temperature Respirations Height Weight Last Period    111/74 98.7  F (37.1  C) (Oral) 16 1.549 m (5' 1\") 71.7 kg (158 lb) 10/27/2018    Pulse Oximetry BMI (Body Mass Index)                96% 29.85 kg/m2          MyChart Information     [facility] gives you secure access to your electronic health record. If you see a primary care provider, you can also send messages to your care team and make appointments. If you have questions, please call your primary care clinic.  If you do not have a primary care provider, please call [phone] and they will assist you.        Care EveryWhere ID     This is your Care EveryWhere ID. This could be used by other organizations to access your [facility] medical records  [id number]        Equal Access to Services     CARYL JON : Hadii delmy khan hadasho Soomaali, waaxda luqadaha, qaybta kaalmada aderoryyasarah, krista oliveros . So [facility] [phone].    ATENCIÓN: Si habla español, tiene a everett disposición servicios gratuitos de asistencia lingüística. Llame al [phone].    We comply with applicable federal civil rights laws and [place] laws. We do not discriminate on the basis of race, color, national origin, age, disability, sex, sexual orientation, or gender identity.               Review of your medicines      START taking        Dose / Directions    HYDROcodone-acetaminophen 5-325 MG tablet   Commonly known as:  NORCO   Used for:  Mass on back        Dose:  1-2 tablet   Take 1-2 tablets by mouth every 4 hours as needed (Moderate to Severe Pain)   Quantity:  20 tablet   Refills:  0       ibuprofen 600 MG tablet   Commonly known as:  ADVIL/MOTRIN   Used for:  Mass on back        Dose:  600 " mg   Take 1 tablet (600 mg) by mouth every 6 hours as needed for pain (mild)   Quantity:  30 tablet   Refills:  0         CONTINUE these medicines which have NOT CHANGED        Dose / Directions    AMOXICILLIN PO        Refills:  0       lisinopril 10 MG tablet   Commonly known as:  PRINIVIL/ZESTRIL   Used for:  Essential hypertension        Dose:  10 mg   Take 1 tablet (10 mg) by mouth daily   Quantity:  90 tablet   Refills:  3       methylphenidate 10 MG tablet   Commonly known as:  RITALIN   Used for:  Idiopathic hypersomnia with long sleep time        Take 1/2 - 1 tablet by mouth twice daily as needed.   Quantity:  60 tablet   Refills:  0       penicillin V 500 MG tablet   Commonly known as:  VEETID        Dose:  500 mg   Take 1 tablet (500 mg) by mouth 2 times daily for 10 days   Quantity:  20 tablet   Refills:  0       * venlafaxine 37.5 MG 24 hr capsule   Commonly known as:  EFFEXOR-XR   Used for:  Generalized anxiety disorder, Major depressive disorder, recurrent episode, moderate (H)        Take with 75 mg for total of 112.5 mg once daily   Quantity:  90 capsule   Refills:  3       * venlafaxine 75 MG 24 hr capsule   Commonly known as:  EFFEXOR-XR   Indication:  increasing about once per week to 150 mb   Used for:  Major depressive disorder, recurrent episode, moderate (H), Generalized anxiety disorder        Dose:  75 mg   Take 1 capsule (75 mg) by mouth daily   Quantity:  90 capsule   Refills:  3       * Notice:  This list has 2 medication(s) that are the same as other medications prescribed for you. Read the directions carefully, and ask your doctor or other care provider to review them with you.         Where to get your medicines      Some of these will need a paper prescription and others can be bought over the counter. Ask your nurse if you have questions.     Bring a paper prescription for each of these medications     HYDROcodone-acetaminophen 5-325 MG tablet       You don't need a prescription for  these medications     ibuprofen 600 MG tablet                Protect others around you: Learn how to safely use, store and throw away your medicines at www.disposemymeds.org.        ANTIBIOTIC INSTRUCTION     You've Been Prescribed an Antibiotic - Now What?  Your healthcare team thinks that you or your loved one might have an infection. Some infections can be treated with antibiotics, which are powerful, life-saving drugs. Like all medications, antibiotics have side effects and should only be used when necessary. There are some important things you should know about your antibiotic treatment.      Your healthcare team may run tests before you start taking an antibiotic.    Your team may take samples (e.g., from your blood, urine or other areas) to run tests to look for bacteria. These test can be important to determine if you need an antibiotic at all and, if you do, which antibiotic will work best.      Within a few days, your healthcare team might change or even stop your antibiotic.    Your team may start you on an antibiotic while they are working to find out what is making you sick.    Your team might change your antibiotic because test results show that a different antibiotic would be better to treat your infection.    In some cases, once your team has more information, they learn that you do not need an antibiotic at all. They may find out that you don't have an infection, or that the antibiotic you're taking won't work against your infection. For example, an infection caused by a virus can't be treated with antibiotics. Staying on an antibiotic when you don't need it is more likely to be harmful than helpful.      You may experience side effects from your antibiotic.    Like all medications, antibiotics have side effects. Some of these can be serious.    Let you healthcare team know if you have any known allergies when you are admitted to the hospital.    One significant side effect of nearly all antibiotics  is the risk of severe and sometimes deadly diarrhea caused by Clostridium difficile (C. Difficile). This occurs when a person takes antibiotics because some good germs are destroyed. Antibiotic use allows C. diificile to take over, putting patients at high risk for this serious infection.    As a patient or caregiver, it is important to understand your or your loved one's antibiotic treatment. It is especially important for caregivers to speak up when patients can't speak for themselves. Here are some important questions to ask your healthcare team.    What infection is this antibiotic treating and how do you know I have that infection?    What side effects might occur from this antibiotic?    How long will I need to take this antibiotic?    Is it safe to take this antibiotic with other medications or supplements (e.g., vitamins) that I am taking?     Are there any special directions I need to know about taking this antibiotic? For example, should I take it with food?    How will I be monitored to know whether my infection is responding to the antibiotic?    What tests may help to make sure the right antibiotic is prescribed for me?      Information provided by:  www.cdc.gov/getsmart  U.S. Department of Health and Human Services  Centers for disease Control and Prevention  National Center for Emerging and Zoonotic Infectious Diseases  Division of Healthcare Quality Promotion        Information about OPIOIDS     PRESCRIPTION OPIOIDS: WHAT YOU NEED TO KNOW   We gave you an opioid (narcotic) pain medicine. It is important to manage your pain, but opioids are not always the best choice. You should first try all the other options your care team gave you. Take this medicine for as short a time (and as few doses) as possible.    Some activities can increase your pain, such as bandage changes or therapy sessions. It may help to take your pain medicine 30 to 60 minutes before these activities. Reduce your stress by getting  enough sleep, working on hobbies you enjoy and practicing relaxation or meditation. Talk to your care team about ways to manage your pain beyond prescription opioids.    These medicines have risks:    DO NOT drive when on new or higher doses of pain medicine. These medicines can affect your alertness and reaction times, and you could be arrested for driving under the influence (DUI). If you need to use opioids long-term, talk to your care team about driving.    DO NOT operate heavy machinery    DO NOT do any other dangerous activities while taking these medicines.    DO NOT drink any alcohol while taking these medicines.     If the opioid prescribed includes acetaminophen, DO NOT take with any other medicines that contain acetaminophen. Read all labels carefully. Look for the word  acetaminophen  or  Tylenol.  Ask your pharmacist if you have questions or are unsure.    You can get addicted to pain medicines, especially if you have a history of addiction (chemical, alcohol or substance dependence). Talk to your care team about ways to reduce this risk.    All opioids tend to cause constipation. Drink plenty of water and eat foods that have a lot of fiber, such as fruits, vegetables, prune juice, apple juice and high-fiber cereal. Take a laxative (Miralax, milk of magnesia, Colace, Senna) if you don t move your bowels at least every other day. Other side effects include upset stomach, sleepiness, dizziness, throwing up, tolerance (needing more of the medicine to have the same effect), physical dependence and slowed breathing.    Store your pills in a secure place, locked if possible. We will not replace any lost or stolen medicine. If you don t finish your medicine, please throw away (dispose) as directed by your pharmacist. The [place] Pollution Control Agency has more information about safe disposal: https://www.pca.[facility].[place].us/living-green/managing-unwanted-medications             Medication List: This is a list  of all your medications and when to take them. Check marks below indicate your daily home schedule. Keep this list as a reference.      Medications           Morning Afternoon Evening Bedtime As Needed    AMOXICILLIN PO                                HYDROcodone-acetaminophen 5-325 MG tablet   Commonly known as:  NORCO   Take 1-2 tablets by mouth every 4 hours as needed (Moderate to Severe Pain)                                ibuprofen 600 MG tablet   Commonly known as:  ADVIL/MOTRIN   Take 1 tablet (600 mg) by mouth every 6 hours as needed for pain (mild)                                lisinopril 10 MG tablet   Commonly known as:  PRINIVIL/ZESTRIL   Take 1 tablet (10 mg) by mouth daily                                methylphenidate 10 MG tablet   Commonly known as:  RITALIN   Take 1/2 - 1 tablet by mouth twice daily as needed.                                penicillin V 500 MG tablet   Commonly known as:  VEETID   Take 1 tablet (500 mg) by mouth 2 times daily for 10 days                                * venlafaxine 37.5 MG 24 hr capsule   Commonly known as:  EFFEXOR-XR   Take with 75 mg for total of 112.5 mg once daily                                * venlafaxine 75 MG 24 hr capsule   Commonly known as:  EFFEXOR-XR   Take 1 capsule (75 mg) by mouth daily                                * Notice:  This list has 2 medication(s) that are the same as other medications prescribed for you. Read the directions carefully, and ask your doctor or other care provider to review them with you.

## 2018-11-29 NOTE — OP NOTE
Procedure Date: 11/29/18     PREOPERATIVE DIAGNOSIS: Left back mass  POSTOPERATIVE DIAGNOSIS: Same     PROCEDURE: Excision of left back mass     ATTENDING SURGEON: Rolando Claros DO    ESTIMATED BLOOD LOSS: 5mL    ANESTHESIA: Monitored Anesthesia Care plus local    Assistant: John Guillaume PA-C (needed for retraction and suction)     INDICATIONS FOR PROCEDURE: : Cecile Lim presents with a history of an enlarging, tender left back mass. After discussion was held with the patient regarding indications, risks, benefits and alternatives, benefits, and risks, her questions were addressed and she understood and wished to proceed. Specific risks discussed included bleeding, infection, seroma, need for additional treatment, nontherapeutic intervention, wound complication (such as dehiscence), recurrence, and rare complications related to surgery and/or anesthesia such as venous thromboembolism and cardiorespiratory complications.     PROCEDURE: After informed consent was obtained, the patient was brought to the operating room and placed in the right lateral decubitus position position on the Operating Room table. Anesthesia was then administered. The surgical site was then prepped and draped in the typical sterile fashion. A time-out was performed to verify patient and procedure.      Local anesthetic was delivered, and an incision was carried out over the mass.  The mass was deep to the subcutaneous tissue just superficial to the muscle.  The mass appeared to be lipomatous.   The surrounding tissue was carefully dissected to free the mass, to a width of 15 cm, length of 8 cm, and subcutaneous depth of 4cm. Once excised this was passed off the field and sent routine to pathology. Hemostasis was assured. Sterile saline irrigant was used, and the irrigant solution suctioned free. An instrument count, including laparotomy pads, sponges and needles was performed and found to be correct.       The subcutaneous tissue  was closed in layers using interrupted 2-0 Vicryl suture followed by 3-0 Vicryl suture, and skin was closed using a subcuticular suture of 4-0 Monocryl. The skin was cleansed and dried, before administration of sterile glue.      The patient tolerated the procedure well, was allowed to recover and was transferred to the recovery room in stable condition.    Rolando Claros DO on 11/29/2018 at 9:35 AM

## 2018-11-30 ENCOUNTER — HOSPITAL ENCOUNTER (EMERGENCY)
Facility: CLINIC | Age: 38
Discharge: HOME | End: 2018-11-30
Attending: EMERGENCY MEDICINE | Admitting: EMERGENCY MEDICINE
Payer: COMMERCIAL

## 2018-11-30 VITALS
HEART RATE: 84 BPM | TEMPERATURE: 98.8 F | OXYGEN SATURATION: 97 % | SYSTOLIC BLOOD PRESSURE: 130 MMHG | RESPIRATION RATE: 18 BRPM | DIASTOLIC BLOOD PRESSURE: 90 MMHG

## 2018-11-30 DIAGNOSIS — Z98.890 POST-OPERATIVE STATE: ICD-10-CM

## 2018-11-30 PROCEDURE — 99284 EMERGENCY DEPT VISIT MOD MDM: CPT | Mod: Z6 | Performed by: EMERGENCY MEDICINE

## 2018-11-30 PROCEDURE — 99282 EMERGENCY DEPT VISIT SF MDM: CPT | Performed by: EMERGENCY MEDICINE

## 2018-11-30 RX ORDER — HYDROCODONE BITARTRATE AND ACETAMINOPHEN 5; 325 MG/1; MG/1
1 TABLET ORAL EVERY 4 HOURS PRN
Qty: 8 TABLET | Refills: 0 | Status: ON HOLD | OUTPATIENT
Start: 2018-11-30 | End: 2018-12-07

## 2018-11-30 ASSESSMENT — ENCOUNTER SYMPTOMS
VOMITING: 0
BACK PAIN: 1
NAUSEA: 1
LIGHT-HEADEDNESS: 1
WOUND: 1

## 2018-11-30 NOTE — ED AVS SNAPSHOT
[facility]    [address] [facility] [address]    Phone:  [phone]    Fax:  [phone]                                       Cecile Lim   MRN: [id number]    Department:  [facility]   Date of Visit:  11/30/2018           Patient Information     Date Of Birth          1980        Your diagnoses for this visit were:     Post-operative state drainage and swelling of incisional site s/p lipoma removal.       You were seen by Joe Brooks MD.      Follow-up Information     Follow up with Fawn Talley DO.    Specialty:  Internal Medicine    Why:  As needed    Contact information:    [address] [place] [address]  [phone]          Follow up with [facility].    Specialty:  EMERGENCY MEDICINE    Contact information:    [address] [place] [id number] [phone]    Additional information:    The medical center is located at   [address] [place] [address] (between [facility] and   [address], [place] miles north   of [place]).        Follow up with [facility].    Specialty:  EMERGENCY MEDICINE    Why:  If symptoms worsen    Contact information:    [address] [place] [address] [phone]    Additional information:    The medical center is located at   [address] [place] [address] (between [facility] and   [address], four miles north   of [place]).        Discharge Instructions       Return if worsening pain drainage redness or swelling occur.  Call Dr. Claros if the symptoms are not improving by tomorrow.  Take pain medication as directed change dressing as directed if any fevers chills nausea vomiting abdominal pain back pain or other symptoms occur please return for further evaluation and care.    Your next 10 appointments already scheduled     Dec 13, 2018  3:45 PM CST   [facility] DO    [facility]    [address]  [facility] [address]   [phone]              24 Hour Appointment Hotline       To make an appointment at any [facility], call [phone] ([phone]). If you don't have a family doctor or clinic, we will help you find one. [facility] are conveniently located to serve the needs of you and your family.             Review of your medicines      CONTINUE these medicines which may have CHANGED, or have new prescriptions. If we are uncertain of the size of tablets/capsules you have at home, strength may be listed as something that might have changed.        Dose / Directions Last dose taken    * HYDROcodone-acetaminophen 5-325 MG tablet   Commonly known as:  NORCO   Dose:  1-2 tablet   What changed:  Another medication with the same name was added. Make sure you understand how and when to take each.   Quantity:  20 tablet        Take 1-2 tablets by mouth every 4 hours as needed (Moderate to Severe Pain)   Refills:  0        * HYDROcodone-acetaminophen 5-325 MG tablet   Commonly known as:  NORCO   Dose:  1 tablet   What changed:  You were already taking a medication with the same name, and this prescription was added. Make sure you understand how and when to take each.   Quantity:  8 tablet        Take 1 tablet by mouth every 4 hours as needed for pain   Refills:  0        * Notice:  This list has 2 medication(s) that are the same as other medications prescribed for you. Read the directions carefully, and ask your doctor or other care provider to review them with you.      Our records show that you are taking the medicines listed below. If these are incorrect, please call your family doctor or clinic.        Dose / Directions Last dose taken    ibuprofen 600 MG tablet   Commonly known as:  ADVIL/MOTRIN   Dose:  600 mg   Quantity:  30 tablet        Take 1 tablet (600 mg) by mouth every 6 hours as needed for pain (mild)    Refills:  0        lisinopril 10 MG tablet   Commonly known as:  PRINIVIL/ZESTRIL   Dose:  10 mg   Quantity:  90 tablet        Take 1 tablet (10 mg) by mouth daily   Refills:  3        methylphenidate 10 MG tablet   Commonly known as:  RITALIN   Quantity:  60 tablet        Take 1/2 - 1 tablet by mouth twice daily as needed.   Refills:  0        penicillin V 500 MG tablet   Commonly known as:  VEETID   Dose:  500 mg   Quantity:  20 tablet        Take 1 tablet (500 mg) by mouth 2 times daily for 10 days   Refills:  0        * venlafaxine 37.5 MG 24 hr capsule   Commonly known as:  EFFEXOR-XR   Quantity:  90 capsule        Take with 75 mg for total of 112.5 mg once daily   Refills:  3        * venlafaxine 75 MG 24 hr capsule   Commonly known as:  EFFEXOR-XR   Dose:  75 mg   Indication:  increasing about once per week to 150 mb   Quantity:  90 capsule        Take 1 capsule (75 mg) by mouth daily   Refills:  3        * Notice:  This list has 2 medication(s) that are the same as other medications prescribed for you. Read the directions carefully, and ask your doctor or other care provider to review them with you.            Information about OPIOIDS     PRESCRIPTION OPIOIDS: WHAT YOU NEED TO KNOW   We gave you an opioid (narcotic) pain medicine. It is important to manage your pain, but opioids are not always the best choice. You should first try all the other options your care team gave you. Take this medicine for as short a time (and as few doses) as possible.    Some activities can increase your pain, such as bandage changes or therapy sessions. It may help to take your pain medicine 30 to 60 minutes before these activities. Reduce your stress by getting enough sleep, working on hobbies you enjoy and practicing relaxation or meditation. Talk to your care team about ways to manage your pain beyond prescription opioids.    These medicines have risks:    DO NOT drive when on new or higher doses of pain medicine. These  medicines can affect your alertness and reaction times, and you could be arrested for driving under the influence (DUI). If you need to use opioids long-term, talk to your care team about driving.    DO NOT operate heavy machinery    DO NOT do any other dangerous activities while taking these medicines.    DO NOT drink any alcohol while taking these medicines.     If the opioid prescribed includes acetaminophen, DO NOT take with any other medicines that contain acetaminophen. Read all labels carefully. Look for the word  acetaminophen  or  Tylenol.  Ask your pharmacist if you have questions or are unsure.    You can get addicted to pain medicines, especially if you have a history of addiction (chemical, alcohol or substance dependence). Talk to your care team about ways to reduce this risk.    All opioids tend to cause constipation. Drink plenty of water and eat foods that have a lot of fiber, such as fruits, vegetables, prune juice, apple juice and high-fiber cereal. Take a laxative (Miralax, milk of magnesia, Colace, Senna) if you don t move your bowels at least every other day. Other side effects include upset stomach, sleepiness, dizziness, throwing up, tolerance (needing more of the medicine to have the same effect), physical dependence and slowed breathing.    Store your pills in a secure place, locked if possible. We will not replace any lost or stolen medicine. If you don t finish your medicine, please throw away (dispose) as directed by your pharmacist. The [place] [employer] has more information about safe disposal: https://www.pca.state.[place]/living-green/managing-unwanted-medications        Prescriptions were sent or printed at these locations (1 Prescription)                   Other Prescriptions                Printed at Department/Unit printer (1 of 1)         HYDROcodone-acetaminophen (NORCO) 5-325 MG tablet                Orders Needing Specimen Collection     None      Pending  Results     Date and Time Order Name Status Description    11/29/2018 0901 Surgical pathology exam In process             Pending Culture Results     Date and Time Order Name Status Description    11/29/2018 0901 Surgical pathology exam In process             Pending Results Instructions     If you had any lab results that were not finalized at the time of your Discharge, you can call the ED Lab Result RN at [phone]. You will be contacted by this team for any positive Lab results or changes in treatment. The nurses are available 7 days a week from 10A to 6:30P.  You can leave a message 24 hours per day and they will return your call.        Test Results From Your Hospital Stay               Thank you for choosing [facility]       Thank you for choosing [facility] for your care. Our goal is always to provide you with excellent care. Hearing back from our patients is one way we can continue to improve our services. Please take a few minutes to complete the written survey that you may receive in the mail after you visit with us. Thank you!        [employer] Information     [employer] gives you secure access to your electronic health record. If you see a primary care provider, you can also send messages to your care team and make appointments. If you have questions, please call your primary care clinic.  If you do not have a primary care provider, please call [phone] and they will assist you.        Care EveryWhere ID     This is your Care EveryWhere ID. This could be used by other organizations to access your [facility] medical records  [id number]        Equal Access to Services     CARYL JON AH: Shala Camargo, santo cool, qakrista king. So [facility] [phone].    ATENCIÓN: Si habla español, tiene a everett disposición servicios gratuitos de asistencia lingüística. Llame al [phone].    We comply with applicable federal civil rights laws and  [place] laws. We do not discriminate on the basis of race, color, national origin, age, disability, sex, sexual orientation, or gender identity.            After Visit Summary       This is your record. Keep this with you and show to your community pharmacist(s) and doctor(s) at your next visit.

## 2018-11-30 NOTE — ED AVS SNAPSHOT
[facility] Emergency Department    [address] [address]    Phone:  [phone]    Fax:  [phone]                                       Cecile Lim   MRN: [id number]    Department:  [facility] Emergency Department   Date of Visit:  11/30/2018           After Visit Summary Signature Page     I have received my discharge instructions, and my questions have been answered. I have discussed any challenges I see with this plan with the nurse or doctor.    ..........................................................................................................................................  Patient/Patient Representative Signature      ..........................................................................................................................................  Patient Representative Print Name and Relationship to Patient    ..................................................               ................................................  Date                                   Time    ..........................................................................................................................................  Reviewed by Signature/Title    ...................................................              ..............................................  Date                                               Time          22EPIC Rev 08/18

## 2018-12-01 NOTE — ED NOTES
"Pt presented unaccompanied, c/o incisional pain/ drainage following Lipoma procedure yesterday. Pt stated she returned to work today, and her clothes became \"soaked with blood.\" Pt stated coworkers applied fresh steri-strips and bandage.        "

## 2018-12-01 NOTE — ED PROVIDER NOTES
"  History     Chief Complaint   Patient presents with     Post-op Problem     mass removed from back yesterday, feels like incision opened up, L arm feels numb     HPI  Cecile Lim is a 37 year old female who presents to the ED for evaluation of a post operative problem. The patient had a lipoma removed from her lower left back yesterday by Dr. Claros. She states that she attended work today and only moved up and down a few times during the course of the day, but that she experienced pain in the incisional area each time she moved. She did not take her prescription of Norco this morning due to attending work. By 14:00 this afternoon, the patient states that the pain in her incisional area was significant and it felt like her incision had opened. She adds her \"scrub top and jacket were soaking wet\". The patient's co-worker applied Steri-Strips and pressure to the incision because it continued to \"leak\". There is now worsened swelling in the area. The patient also notes neck pain and left arm numbness in her bicep and hand. She acknowledges nausea but denies emesis. The patient adds that after work she brought her dog outside, and when she was walking up the stairs to go in the house she experienced lightheadedness and \"almost passed out\". Of note, she reports that Dr. Claros originally approved her to return to work on 12/3/2018, however she provided reassurance that she was not going to be moving around today.      Problem List:    Patient Active Problem List    Diagnosis Date Noted     Encounter for surveillance of contraceptives 03/10/2016     Priority: Medium      has had vasectomy       Lipoma of back 11/12/2015     Priority: Medium     Major depressive disorder, recurrent episode, in partial remission (HCC) 12/23/2014     Priority: Medium     Essential hypertension 09/18/2012     Priority: Medium     Stress incontinence, female 02/27/2012     Priority: Medium     CARDIOVASCULAR SCREENING; LDL " GOAL LESS THAN 160 10/31/2010     Priority: Medium     Generalized anxiety disorder 10/20/2010     Priority: Medium     Diagnosis updated by automated process. Provider to review and confirm.       Ureteral stone 07/09/2009     Priority: Medium        Past Medical History:    Past Medical History:   Diagnosis Date     LAURA (generalised anxiety disorder)      HTN (hypertension)      Suicide attempt (H) 11/2014       Past Surgical History:    Past Surgical History:   Procedure Laterality Date     EXCISE MASS TRUNK N/A 11/29/2018    Procedure: Excision of Left Back Mass;  Surgeon: Rolando Claros DO;  Location: [facility] OR     NO HISTORY OF SURGERY  1/2007     SLING TRANSVAGINAL N/A 4/27/2018    Procedure: SLING TRANSVAGINAL;  Pubovaginal sling ([place]);  Surgeon: Douglas Hopkins MD;  Location: [facility] OR     SOFT TISSUE SURGERY         Family History:    Family History   Problem Relation Age of Onset     Hypertension Mother      Musculoskeletal Disorder Mother      degenerative disc disease     Thyroid Disease Mother      Depression Mother      Cerebrovascular Disease Mother      Hypertension Father      Cerebrovascular Disease Father      age 40     Hypertension Maternal Grandfather      Cardiovascular Maternal Grandfather      Cancer Paternal Grandfather      lung     Asthma No family hx of      C.A.D. No family hx of      Diabetes No family hx of      Breast Cancer No family hx of      Cancer - colorectal No family hx of      Prostate Cancer No family hx of        Social History:  Marital Status:   [2]  Social History   Substance Use Topics     Smoking status: Never Smoker     Smokeless tobacco: Never Used     Alcohol use 0.0 oz/week     0 Standard drinks or equivalent per week      Comment: Rare. Maybe one drink a month        Medications:      AMOXICILLIN PO   HYDROcodone-acetaminophen (NORCO) 5-325 MG tablet   ibuprofen (ADVIL/MOTRIN) 600 MG tablet   lisinopril (PRINIVIL/ZESTRIL) 10 MG tablet    methylphenidate (RITALIN) 10 MG tablet   penicillin V potassium (VEETID) 500 MG tablet   venlafaxine (EFFEXOR-XR) 37.5 MG 24 hr capsule   venlafaxine (EFFEXOR-XR) 75 MG 24 hr capsule         Review of Systems   Constitutional: Positive for activity change. Negative for chills and fever.   HENT: Negative for congestion.    Respiratory: Negative for cough and shortness of breath.    Cardiovascular: Negative for chest pain.   Gastrointestinal: Positive for nausea. Negative for vomiting.   Genitourinary: Negative for decreased urine volume.   Musculoskeletal: Positive for back pain and myalgias. Negative for gait problem and neck pain.   Skin: Positive for wound.   Neurological: Positive for light-headedness. Negative for weakness, numbness and headaches.   Hematological: Does not bruise/bleed easily.       Physical Exam   BP: (!) 135/94  Pulse: 84  Temp: 98.8  F (37.1  C)  Resp: 18  SpO2: 97 %      Physical Exam   Constitutional: She appears well-developed and well-nourished. No distress.   Eyes: Conjunctivae are normal.   Psychiatric: She has a normal mood and affect.   Nursing note and vitals reviewed.      HENT: Oral mucosa moist. No lesions.  Neck: Supple.  Pulmonary/Chest: Lungs are clear to auscultation bilaterally.  Lungs: Normal effort.  Back: Left upper lumbar region with 12 cm surgical incision. Moderate swelling and tenderness noted in region. Mild ecchymosis. No significant drainage at this time.  Abdomen: Soft, non-distended, non-tender.   Musculoskeletal: Moving all extremities well. No peripheral edema.   Neurological: Alert. No focal neurologic deficit.   Skin: No rash.      ED Course     ED Course     Procedures               Critical Care time:  none               No results found for this or any previous visit (from the past 24 hour(s)).    Medications - No data to display      7:38PM Patient assessed.      Assessments & Plan (with Medical Decision Making) records were reviewed.  I did discuss the  case with Dr. Claros her surgeon who is on-call.  He reviewed the case with me and stated that he expected this to drain and at this time did not want to open the wound.  He does not think it is an abscess has had been only 1 day.  I discussed possible ultrasound but he did not think that would change things at this time.  He recommended putting reinforced dressing along the incision.  He stated that if the drainage was worsening or the swelling was increasing she should call him as he is on call and he would plan to put a drain in.  Patient understands this.  She had been fairly active today and this might of caused some of the drainage.  She understands if any     I have reviewed the nursing notes.    I have reviewed the findings, diagnosis, plan and need for follow up with the patient.       Discharge Medication List as of 11/30/2018  9:04 PM      START taking these medications    Details   !! HYDROcodone-acetaminophen (NORCO) 5-325 MG tablet Take 1 tablet by mouth every 4 hours as needed for pain, Disp-8 tablet, R-0, Local Print       !! - Potential duplicate medications found. Please discuss with provider.      He was think the barriers that he just showed some since 19 4 cool she is motivated and flank expect    Final diagnoses:   Post-operative state - drainage and swelling of incisional site s/p lipoma removal.       This document serves as a record of the services and decisions personally performed and made by Joe Brooks MD. It was created on HIS/HER behalf by Eliza Ornelas, a trained medical scribe. The creation of this document is based the provider's statements to the medical scribe.  Eliza Ornelas 7:40 PM 11/30/2018    Provider:   The information in this document, created by the medical scribe for me, accurately reflects the services I personally performed and the decisions made by me. I have reviewed and approved this document for accuracy prior to leaving the patient care  area.  Joe Brooks MD 7:40 PM 11/30/2018 11/30/2018   [facility] EMERGENCY DEPARTMENT     Joe Brooks MD  12/02/18 1535

## 2018-12-01 NOTE — DISCHARGE INSTRUCTIONS
Return if worsening pain drainage redness or swelling occur.  Call Dr. Claros if the symptoms are not improving by tomorrow.  Take pain medication as directed change dressing as directed if any fevers chills nausea vomiting abdominal pain back pain or other symptoms occur please return for further evaluation and care.

## 2018-12-02 ASSESSMENT — ENCOUNTER SYMPTOMS
NECK PAIN: 0
WEAKNESS: 0
CHILLS: 0
SHORTNESS OF BREATH: 0
HEADACHES: 0
MYALGIAS: 1
BRUISES/BLEEDS EASILY: 0
FEVER: 0
NUMBNESS: 0
ACTIVITY CHANGE: 1
COUGH: 0

## 2018-12-04 ENCOUNTER — MYC MEDICAL ADVICE (OUTPATIENT)
Dept: SLEEP MEDICINE | Facility: CLINIC | Age: 38
End: 2018-12-04

## 2018-12-04 ENCOUNTER — OFFICE VISIT (OUTPATIENT)
Dept: SURGERY | Facility: CLINIC | Age: 38
End: 2018-12-04
Payer: COMMERCIAL

## 2018-12-04 VITALS
WEIGHT: 158 LBS | BODY MASS INDEX: 29.83 KG/M2 | TEMPERATURE: 98.3 F | HEIGHT: 61 IN | HEART RATE: 87 BPM | DIASTOLIC BLOOD PRESSURE: 81 MMHG | SYSTOLIC BLOOD PRESSURE: 128 MMHG

## 2018-12-04 DIAGNOSIS — G47.11 IDIOPATHIC HYPERSOMNIA WITH LONG SLEEP TIME: ICD-10-CM

## 2018-12-04 DIAGNOSIS — I97.622 POSTOPERATIVE SEROMA INVOLVING CIRCULATORY SYSTEM AFTER NON-CIRCULATORY PROCEDURE: Primary | ICD-10-CM

## 2018-12-04 PROCEDURE — 99024 POSTOP FOLLOW-UP VISIT: CPT | Performed by: SURGERY

## 2018-12-04 RX ORDER — METHYLPHENIDATE HYDROCHLORIDE 10 MG/1
TABLET ORAL
Qty: 60 TABLET | Refills: 0 | Status: SHIPPED | OUTPATIENT
Start: 2019-01-04 | End: 2018-12-07

## 2018-12-04 RX ORDER — METHYLPHENIDATE HYDROCHLORIDE 10 MG/1
TABLET ORAL
Qty: 60 TABLET | Refills: 0 | Status: SHIPPED | OUTPATIENT
Start: 2019-02-04 | End: 2018-12-07

## 2018-12-04 RX ORDER — METHYLPHENIDATE HYDROCHLORIDE 10 MG/1
TABLET ORAL
Qty: 60 TABLET | Refills: 0 | Status: SHIPPED | OUTPATIENT
Start: 2018-12-04 | End: 2019-01-28

## 2018-12-04 NOTE — TELEPHONE ENCOUNTER
Chief Complaint   Patient presents with     Refill Request     methylphenidate (RITALIN) 10 MG tablet      Refill request received via fax by pharmacy   [place] [facility] - - [place], [place] - [id number] [facility] tel:[phone]    Pending Prescriptions:                       Disp   Refills    methylphenidate (RITALIN) 10 MG tablet    60 tab*0            Sig: Take 1/2 - 1 tablet by mouth twice daily as           needed.    methylphenidate (RITALIN) 10 MG tablet    60 tab*0            Sig: Take 1/2 - 1 tablet by mouth twice daily as           needed.    methylphenidate (RITALIN) 10 MG tablet    60 tab*0            Sig: Take 1/2 - 1 tablet by mouth twice daily as           needed.         Last Written Prescription Date:  09/13/2018  Last Fill Quantity: 60 per 30 days,   # refills: 0  Last Office Visit with prescribing provider: 09/13/2018  Future Office visit:    Next 5 appointments (look out 90 days)     Dec 04, 2018  2:45 PM CST   [facility] Rolando Claros DO   [facility]    [address] [place] [id number]   [phone]            Dec 13, 2018  3:45 PM CST   Return Visit with Rolando Claros DO   [facility]    [address] [place] [id number]   [phone]                   Routing refill request to provider for review/approval because:  Drug not on the G, P or [facility] refill protocol or controlled substance

## 2018-12-04 NOTE — LETTER
"    12/4/2018         RE: Cecile Lim  [id number] [address] [place] [id number]        Dear Colleague,    Thank you for referring your patient, Cecile Lim, to the [facility]. Please see a copy of my visit note below.    General Surgery Post Op    Pt returns for follow up visit s/p lipoma excision on 11/29/2018.    Patient presented to ED with increased swelling and pain.  Patient evaluated by ED and discharged.  She notes serous drainage from the area and continued swelling.  Denies fevers or chills.  Pain well controlled currently.  She is able to work and do her activities she needs to.    Physical exam: Vitals: /81 (BP Location: Right arm, Patient Position: Sitting, Cuff Size: Adult Regular)  Pulse 87  Temp 98.3  F (36.8  C) (Tympanic)  Ht 1.549 m (5' 1\")  Wt 71.7 kg (158 lb)  BMI 29.85 kg/m2  BMI= Body mass index is 29.85 kg/(m^2).    Exam:  Constitutional: healthy, alert and no distress  Cardiovascular: negative  Respiratory: negative  Skin: Incision with mild dependent ecchymosis.  Significant swelling consistent with seroma versus hematoma.    Path:  pending    Assessment:     ICD-10-CM    1. Postoperative seroma involving circulatory system after non-circulatory procedure I97.622      Plan: Mrs. Lim presents after her lipoma excision with a post-operative seroma versus hematoma.  At this point is causing her mild discomfort though she has been able to work and perform her usual activities.  I discussed the nature of seroma/hematoma with the patient and presented options of watchful waiting, in office aspiration or intraoperative drainage with drain placement.  At this point, she would like to opt for watchful waiting and will keep her appointment next week.  I discussed appropriate wound care with regular dressing changes as needed.  She can continue to shower during this time.  If she experiences purulent drainage, fevers, worsening pain/swelling, she will call the " office.  Patient expressed understanding and denies need for more pain medications at this time.    Rolando Claros, DO on 12/4/2018 at 2:51 PM        Again, thank you for allowing me to participate in the care of your patient.        Sincerely,        Rolando Claros, DO

## 2018-12-04 NOTE — NURSING NOTE
"Initial /81 (BP Location: Right arm, Patient Position: Sitting, Cuff Size: Adult Regular)  Pulse 87  Temp 98.3  F (36.8  C) (Tympanic)  Ht 1.549 m (5' 1\")  Wt 71.7 kg (158 lb)  BMI 29.85 kg/m2 Estimated body mass index is 29.85 kg/(m^2) as calculated from the following:    Height as of this encounter: 1.549 m (5' 1\").    Weight as of this encounter: 71.7 kg (158 lb). .    Britt Hallman MA    "

## 2018-12-04 NOTE — PROGRESS NOTES
"General Surgery Post Op    Pt returns for follow up visit s/p lipoma excision on 11/29/2018.    Patient presented to ED with increased swelling and pain.  Patient evaluated by ED and discharged.  She notes serous drainage from the area and continued swelling.  Denies fevers or chills.  Pain well controlled currently.  She is able to work and do her activities she needs to.    Physical exam: Vitals: /81 (BP Location: Right arm, Patient Position: Sitting, Cuff Size: Adult Regular)  Pulse 87  Temp 98.3  F (36.8  C) (Tympanic)  Ht 1.549 m (5' 1\")  Wt 71.7 kg (158 lb)  BMI 29.85 kg/m2  BMI= Body mass index is 29.85 kg/(m^2).    Exam:  Constitutional: healthy, alert and no distress  Cardiovascular: negative  Respiratory: negative  Skin: Incision with mild dependent ecchymosis.  Significant swelling consistent with seroma versus hematoma.    Path:  pending    Assessment:     ICD-10-CM    1. Postoperative seroma involving circulatory system after non-circulatory procedure I97.622      Plan: Mrs. Lim presents after her lipoma excision with a post-operative seroma versus hematoma.  At this point is causing her mild discomfort though she has been able to work and perform her usual activities.  I discussed the nature of seroma/hematoma with the patient and presented options of watchful waiting, in office aspiration or intraoperative drainage with drain placement.  At this point, she would like to opt for watchful waiting and will keep her appointment next week.  I discussed appropriate wound care with regular dressing changes as needed.  She can continue to shower during this time.  If she experiences purulent drainage, fevers, worsening pain/swelling, she will call the office.  Patient expressed understanding and denies need for more pain medications at this time.    Rolando Claros, DO on 12/4/2018 at 2:51 PM      "

## 2018-12-05 LAB — COPATH REPORT: NORMAL

## 2018-12-06 ENCOUNTER — NURSE TRIAGE (OUTPATIENT)
Dept: NURSING | Facility: CLINIC | Age: 38
End: 2018-12-06

## 2018-12-06 NOTE — TELEPHONE ENCOUNTER
"Clinic Action Needed:No  Reason for Call: \"I had surgery to remove a mass and I've been seen recently by the surgeon\".  Caller is reporting that at incision site she has increased pain and swelling.  She was into the office 2 days ago and told to call back if swelling persisted.  She denies drainage or fever, however pain has returned to level of immediate post op pain, swelling is size of grapefruit.  Cecile's surgeon with [facility] is on call lizzie.  Paged Dr. Claros to speak to Cecile at [phone].  Page sent @ 5:02 pm via smart web.  Caller advised to call back if they have not heard back from on call provider within 20 minutes.  Caller appears to understand directives and agrees with plan.    Routed to: Not routed.    Stacy Long RN  [facility]        "

## 2018-12-07 ENCOUNTER — ANESTHESIA EVENT (OUTPATIENT)
Dept: SURGERY | Facility: CLINIC | Age: 38
End: 2018-12-07
Payer: COMMERCIAL

## 2018-12-07 ENCOUNTER — SURGERY (OUTPATIENT)
Age: 38
End: 2018-12-07

## 2018-12-07 ENCOUNTER — HOSPITAL ENCOUNTER (OUTPATIENT)
Facility: CLINIC | Age: 38
Discharge: HOME | End: 2018-12-07
Attending: SURGERY | Admitting: SURGERY
Payer: COMMERCIAL

## 2018-12-07 ENCOUNTER — ANESTHESIA (OUTPATIENT)
Dept: SURGERY | Facility: CLINIC | Age: 38
End: 2018-12-07
Payer: COMMERCIAL

## 2018-12-07 VITALS
RESPIRATION RATE: 16 BRPM | HEART RATE: 73 BPM | OXYGEN SATURATION: 98 % | DIASTOLIC BLOOD PRESSURE: 70 MMHG | BODY MASS INDEX: 29.83 KG/M2 | WEIGHT: 158 LBS | SYSTOLIC BLOOD PRESSURE: 104 MMHG | HEIGHT: 61 IN | TEMPERATURE: 98.7 F

## 2018-12-07 DIAGNOSIS — R22.2 MASS ON BACK: ICD-10-CM

## 2018-12-07 LAB — HCG UR QL: NEGATIVE

## 2018-12-07 PROCEDURE — 25000132 ZZH RX MED GY IP 250 OP 250 PS 637: Performed by: SURGERY

## 2018-12-07 PROCEDURE — 10140 I&D HMTMA SEROMA/FLUID COLLJ: CPT | Mod: 78 | Performed by: SURGERY

## 2018-12-07 PROCEDURE — 71000012 ZZH RECOVERY PHASE 1 LEVEL 1 FIRST HR: Performed by: SURGERY

## 2018-12-07 PROCEDURE — 25000125 ZZHC RX 250: Performed by: NURSE ANESTHETIST, CERTIFIED REGISTERED

## 2018-12-07 PROCEDURE — 25000125 ZZHC RX 250: Performed by: SURGERY

## 2018-12-07 PROCEDURE — 71000027 ZZH RECOVERY PHASE 2 EACH 15 MINS: Performed by: SURGERY

## 2018-12-07 PROCEDURE — 81025 URINE PREGNANCY TEST: CPT | Performed by: NURSE ANESTHETIST, CERTIFIED REGISTERED

## 2018-12-07 PROCEDURE — 37000008 ZZH ANESTHESIA TECHNICAL FEE, 1ST 30 MIN: Performed by: SURGERY

## 2018-12-07 PROCEDURE — 25000128 H RX IP 250 OP 636: Performed by: NURSE ANESTHETIST, CERTIFIED REGISTERED

## 2018-12-07 PROCEDURE — 36000050 ZZH SURGERY LEVEL 2 1ST 30 MIN: Performed by: SURGERY

## 2018-12-07 PROCEDURE — 36000052 ZZH SURGERY LEVEL 2 EA 15 ADDTL MIN: Performed by: SURGERY

## 2018-12-07 PROCEDURE — 37000009 ZZH ANESTHESIA TECHNICAL FEE, EACH ADDTL 15 MIN: Performed by: SURGERY

## 2018-12-07 PROCEDURE — 27210794 ZZH OR GENERAL SUPPLY STERILE: Performed by: SURGERY

## 2018-12-07 PROCEDURE — 40000305 ZZH STATISTIC PRE PROC ASSESS I: Performed by: SURGERY

## 2018-12-07 PROCEDURE — 25000128 H RX IP 250 OP 636: Performed by: SURGERY

## 2018-12-07 RX ORDER — FENTANYL CITRATE 50 UG/ML
INJECTION, SOLUTION INTRAMUSCULAR; INTRAVENOUS PRN
Status: DISCONTINUED | OUTPATIENT
Start: 2018-12-07 | End: 2018-12-07

## 2018-12-07 RX ORDER — HYDROXYZINE HYDROCHLORIDE 25 MG/1
25 TABLET, FILM COATED ORAL EVERY 6 HOURS PRN
Status: DISCONTINUED | OUTPATIENT
Start: 2018-12-07 | End: 2018-12-07 | Stop reason: HOSPADM

## 2018-12-07 RX ORDER — LIDOCAINE HCL/EPINEPHRINE/PF 2%-1:200K
VIAL (ML) INJECTION PRN
Status: DISCONTINUED | OUTPATIENT
Start: 2018-12-07 | End: 2018-12-07 | Stop reason: HOSPADM

## 2018-12-07 RX ORDER — BUPIVACAINE HYDROCHLORIDE 5 MG/ML
INJECTION, SOLUTION PERINEURAL PRN
Status: DISCONTINUED | OUTPATIENT
Start: 2018-12-07 | End: 2018-12-07 | Stop reason: HOSPADM

## 2018-12-07 RX ORDER — NALOXONE HYDROCHLORIDE 0.4 MG/ML
.1-.4 INJECTION, SOLUTION INTRAMUSCULAR; INTRAVENOUS; SUBCUTANEOUS
Status: DISCONTINUED | OUTPATIENT
Start: 2018-12-07 | End: 2018-12-07 | Stop reason: HOSPADM

## 2018-12-07 RX ORDER — HYDROXYZINE HYDROCHLORIDE 50 MG/1
50 TABLET, FILM COATED ORAL EVERY 6 HOURS PRN
Status: DISCONTINUED | OUTPATIENT
Start: 2018-12-07 | End: 2018-12-07 | Stop reason: HOSPADM

## 2018-12-07 RX ORDER — LIDOCAINE 40 MG/G
CREAM TOPICAL
Status: DISCONTINUED | OUTPATIENT
Start: 2018-12-07 | End: 2018-12-07 | Stop reason: HOSPADM

## 2018-12-07 RX ORDER — METOPROLOL TARTRATE 1 MG/ML
1-2 INJECTION, SOLUTION INTRAVENOUS EVERY 5 MIN PRN
Status: DISCONTINUED | OUTPATIENT
Start: 2018-12-07 | End: 2018-12-07 | Stop reason: HOSPADM

## 2018-12-07 RX ORDER — DEXAMETHASONE SODIUM PHOSPHATE 4 MG/ML
4 INJECTION, SOLUTION INTRA-ARTICULAR; INTRALESIONAL; INTRAMUSCULAR; INTRAVENOUS; SOFT TISSUE EVERY 10 MIN PRN
Status: DISCONTINUED | OUTPATIENT
Start: 2018-12-07 | End: 2018-12-07 | Stop reason: HOSPADM

## 2018-12-07 RX ORDER — HYDROMORPHONE HYDROCHLORIDE 1 MG/ML
.3-.5 INJECTION, SOLUTION INTRAMUSCULAR; INTRAVENOUS; SUBCUTANEOUS EVERY 10 MIN PRN
Status: DISCONTINUED | OUTPATIENT
Start: 2018-12-07 | End: 2018-12-07 | Stop reason: HOSPADM

## 2018-12-07 RX ORDER — SODIUM CHLORIDE, SODIUM LACTATE, POTASSIUM CHLORIDE, CALCIUM CHLORIDE 600; 310; 30; 20 MG/100ML; MG/100ML; MG/100ML; MG/100ML
INJECTION, SOLUTION INTRAVENOUS CONTINUOUS
Status: DISCONTINUED | OUTPATIENT
Start: 2018-12-07 | End: 2018-12-07 | Stop reason: HOSPADM

## 2018-12-07 RX ORDER — FENTANYL CITRATE 50 UG/ML
25-50 INJECTION, SOLUTION INTRAMUSCULAR; INTRAVENOUS
Status: DISCONTINUED | OUTPATIENT
Start: 2018-12-07 | End: 2018-12-07 | Stop reason: HOSPADM

## 2018-12-07 RX ORDER — CEFAZOLIN SODIUM 1 G/50ML
1 INJECTION, SOLUTION INTRAVENOUS SEE ADMIN INSTRUCTIONS
Status: DISCONTINUED | OUTPATIENT
Start: 2018-12-07 | End: 2018-12-07 | Stop reason: HOSPADM

## 2018-12-07 RX ORDER — OXYCODONE HYDROCHLORIDE 5 MG/1
5 TABLET ORAL
Status: COMPLETED | OUTPATIENT
Start: 2018-12-07 | End: 2018-12-07

## 2018-12-07 RX ORDER — ONDANSETRON 2 MG/ML
4 INJECTION INTRAMUSCULAR; INTRAVENOUS EVERY 30 MIN PRN
Status: DISCONTINUED | OUTPATIENT
Start: 2018-12-07 | End: 2018-12-07 | Stop reason: HOSPADM

## 2018-12-07 RX ORDER — DEXAMETHASONE SODIUM PHOSPHATE 4 MG/ML
INJECTION, SOLUTION INTRA-ARTICULAR; INTRALESIONAL; INTRAMUSCULAR; INTRAVENOUS; SOFT TISSUE PRN
Status: DISCONTINUED | OUTPATIENT
Start: 2018-12-07 | End: 2018-12-07

## 2018-12-07 RX ORDER — PROPOFOL 10 MG/ML
INJECTION, EMULSION INTRAVENOUS CONTINUOUS PRN
Status: DISCONTINUED | OUTPATIENT
Start: 2018-12-07 | End: 2018-12-07

## 2018-12-07 RX ORDER — ONDANSETRON 4 MG/1
4 TABLET, ORALLY DISINTEGRATING ORAL EVERY 30 MIN PRN
Status: DISCONTINUED | OUTPATIENT
Start: 2018-12-07 | End: 2018-12-07 | Stop reason: HOSPADM

## 2018-12-07 RX ORDER — LIDOCAINE HYDROCHLORIDE 10 MG/ML
INJECTION, SOLUTION INFILTRATION; PERINEURAL PRN
Status: DISCONTINUED | OUTPATIENT
Start: 2018-12-07 | End: 2018-12-07

## 2018-12-07 RX ORDER — HYDROCODONE BITARTRATE AND ACETAMINOPHEN 5; 325 MG/1; MG/1
1-2 TABLET ORAL EVERY 4 HOURS PRN
Qty: 20 TABLET | Refills: 0 | Status: SHIPPED | OUTPATIENT
Start: 2018-12-07 | End: 2019-09-20

## 2018-12-07 RX ORDER — MEPERIDINE HYDROCHLORIDE 25 MG/ML
12.5 INJECTION INTRAMUSCULAR; INTRAVENOUS; SUBCUTANEOUS
Status: DISCONTINUED | OUTPATIENT
Start: 2018-12-07 | End: 2018-12-07 | Stop reason: HOSPADM

## 2018-12-07 RX ORDER — CEFAZOLIN SODIUM 2 G/100ML
2 INJECTION, SOLUTION INTRAVENOUS
Status: DISCONTINUED | OUTPATIENT
Start: 2018-12-07 | End: 2018-12-07 | Stop reason: HOSPADM

## 2018-12-07 RX ORDER — ONDANSETRON 2 MG/ML
INJECTION INTRAMUSCULAR; INTRAVENOUS PRN
Status: DISCONTINUED | OUTPATIENT
Start: 2018-12-07 | End: 2018-12-07

## 2018-12-07 RX ORDER — ACETAMINOPHEN 325 MG/1
650 TABLET ORAL
Status: DISCONTINUED | OUTPATIENT
Start: 2018-12-07 | End: 2018-12-07 | Stop reason: HOSPADM

## 2018-12-07 RX ORDER — ALBUTEROL SULFATE 0.83 MG/ML
2.5 SOLUTION RESPIRATORY (INHALATION) EVERY 4 HOURS PRN
Status: DISCONTINUED | OUTPATIENT
Start: 2018-12-07 | End: 2018-12-07 | Stop reason: HOSPADM

## 2018-12-07 RX ORDER — PROPOFOL 10 MG/ML
INJECTION, EMULSION INTRAVENOUS PRN
Status: DISCONTINUED | OUTPATIENT
Start: 2018-12-07 | End: 2018-12-07

## 2018-12-07 RX ADMIN — FENTANYL CITRATE 50 MCG: 50 INJECTION, SOLUTION INTRAMUSCULAR; INTRAVENOUS at 13:53

## 2018-12-07 RX ADMIN — MIDAZOLAM 1 MG: 1 INJECTION INTRAMUSCULAR; INTRAVENOUS at 13:56

## 2018-12-07 RX ADMIN — PROPOFOL 50 MG: 10 INJECTION, EMULSION INTRAVENOUS at 13:55

## 2018-12-07 RX ADMIN — FENTANYL CITRATE 50 MCG: 50 INJECTION, SOLUTION INTRAMUSCULAR; INTRAVENOUS at 14:00

## 2018-12-07 RX ADMIN — ACETAMINOPHEN 650 MG: 325 TABLET, FILM COATED ORAL at 14:50

## 2018-12-07 RX ADMIN — FENTANYL CITRATE 400 MCG: 50 INJECTION, SOLUTION INTRAMUSCULAR; INTRAVENOUS at 14:30

## 2018-12-07 RX ADMIN — LIDOCAINE HYDROCHLORIDE 5 ML: 10 INJECTION, SOLUTION EPIDURAL; INFILTRATION; INTRACAUDAL; PERINEURAL at 13:36

## 2018-12-07 RX ADMIN — DEXAMETHASONE SODIUM PHOSPHATE 4 MG: 4 INJECTION, SOLUTION INTRA-ARTICULAR; INTRALESIONAL; INTRAMUSCULAR; INTRAVENOUS; SOFT TISSUE at 14:09

## 2018-12-07 RX ADMIN — LIDOCAINE HYDROCHLORIDE 50 MG: 10 INJECTION, SOLUTION INFILTRATION; PERINEURAL at 13:55

## 2018-12-07 RX ADMIN — PROPOFOL 100 MCG/KG/MIN: 10 INJECTION, EMULSION INTRAVENOUS at 13:55

## 2018-12-07 RX ADMIN — MIDAZOLAM 2 MG: 1 INJECTION INTRAMUSCULAR; INTRAVENOUS at 13:50

## 2018-12-07 RX ADMIN — ONDANSETRON 4 MG: 2 INJECTION INTRAMUSCULAR; INTRAVENOUS at 14:10

## 2018-12-07 RX ADMIN — BUPIVACAINE HYDROCHLORIDE 5.5 ML: 5 INJECTION, SOLUTION PERINEURAL at 14:27

## 2018-12-07 RX ADMIN — LIDOCAINE HYDROCHLORIDE,EPINEPHRINE BITARTRATE 5.5 ML: 20; .005 INJECTION, SOLUTION EPIDURAL; INFILTRATION; INTRACAUDAL; PERINEURAL at 14:28

## 2018-12-07 RX ADMIN — OXYCODONE HYDROCHLORIDE 5 MG: 5 TABLET ORAL at 14:50

## 2018-12-07 RX ADMIN — SODIUM CHLORIDE, POTASSIUM CHLORIDE, SODIUM LACTATE AND CALCIUM CHLORIDE: 600; 310; 30; 20 INJECTION, SOLUTION INTRAVENOUS at 13:48

## 2018-12-07 ASSESSMENT — LIFESTYLE VARIABLES: TOBACCO_USE: 1

## 2018-12-07 NOTE — ANESTHESIA PREPROCEDURE EVALUATION
Anesthesia Evaluation     . Pt has had prior anesthetic. Type: General    No history of anesthetic complications          ROS/MED HX    ENT/Pulmonary: Comment: Negative rapid strep on 11-25-18, culture negative.    (+)tobacco use, , . .    Neurologic:  - neg neurologic ROS     Cardiovascular:     (+) hypertension----. : . . . :. .       METS/Exercise Tolerance:  >4 METS   Hematologic:  - neg hematologic  ROS       Musculoskeletal:   (+) , , other musculoskeletal- hematoma/seroma s/p back mass excision      GI/Hepatic:  - neg GI/hepatic ROS      (-) liver disease   Renal/Genitourinary: Comment: Ureteral stone    (+) Nephrolithiasis , Other Renal/ Genitourinary, stress incontinence      Endo:     (+) Obesity, .      Psychiatric:     (+) psychiatric history other (comment), anxiety and depression (suicidal attempt, dysthymia)      Infectious Disease:  - neg infectious disease ROS       Malignancy:      - no malignancy   Other:    - neg other ROS                 Physical Exam  Normal systems: cardiovascular, pulmonary and dental    Airway   Mallampati: II  TM distance: >3 FB  Neck ROM: full    Dental     Cardiovascular       Pulmonary                         Anesthesia Plan      History & Physical Review  History and physical reviewed and following examination; no interval change.    ASA Status:  2 .    NPO Status:  > 8 hours    Plan for MAC and General with Propofol and Intravenous induction. Reason for MAC:  Deep or markedly invasive procedure (G8)  PONV prophylaxis:  Ondansetron (or other 5HT-3) and Dexamethasone or Solumedrol       Postoperative Care  Postoperative pain management:  IV analgesics and Oral pain medications.      Consents  Anesthetic plan, risks, benefits and alternatives discussed with:  Patient..                          .

## 2018-12-07 NOTE — INTERVAL H&P NOTE
Mrs. Lim presents with fluid collection at previous surgery site.  It is enlarging, causing discomfort. I discussed operative evacuation of the area and placement of a drain.  She wishes to proceed.  Risks discussed include but are not limited to: bleeding, infection, damage to adjacent structures, need for further surgery and chronic wound.  She wishes to proceed.    Rolando Claros, DO on 12/7/2018 at 1:16 PM

## 2018-12-07 NOTE — ANESTHESIA POSTPROCEDURE EVALUATION
Patient: Cecile Ramírez Strub    Procedure(s):  Evacuation of back hematoma with placement of drain    Diagnosis:Back hematoma  Diagnosis Additional Information: No value filed.    Anesthesia Type:  MAC, General    Note:  Anesthesia Post Evaluation    Patient location during evaluation: Bedside  Patient participation: Able to fully participate in evaluation  Level of consciousness: awake  Pain management: adequate  Airway patency: patent  Cardiovascular status: stable  Respiratory status: room air  Hydration status: stable  PONV: none     Anesthetic complications: None          Last vitals:  Vitals:    12/07/18 1308   BP: 129/86   Pulse: 73   Resp: 18   Temp: 37.1  C (98.8  F)   SpO2: 98%         Electronically Signed By: JOAQUIN Perez CRNA  December 7, 2018  2:42 PM

## 2018-12-07 NOTE — ANESTHESIA CARE TRANSFER NOTE
Patient: Cecile Ramírez Strub    Procedure(s):  Evacuation of back hematoma with placement of drain    Diagnosis: Back hematoma  Diagnosis Additional Information: No value filed.    Anesthesia Type:   MAC, General     Note:  Airway :Room Air  Patient transferred to:Phase II  Handoff Report: Identifed the Patient, Identified the Reponsible Provider, Reviewed the pertinent medical history, Discussed the surgical course, Reviewed Intra-OP anesthesia mangement and issues during anesthesia, Set expectations for post-procedure period and Allowed opportunity for questions and acknowledgement of understanding      Vitals: (Last set prior to Anesthesia Care Transfer)    CRNA VITALS  12/7/2018 1406 - 12/7/2018 1436      12/7/2018             Pulse: 85    SpO2: 97 %                Electronically Signed By: JOAQUIN Perez CRNA  December 7, 2018  2:36 PM

## 2018-12-07 NOTE — OP NOTE
Procedure Date: 12/07/18     PREOPERATIVE DIAGNOSIS: Post-operative hematoma  POSTOPERATIVE DIAGNOSIS: Same     PROCEDURE: Evacuation of hematoma, placement of drain     ATTENDING SURGEON: Rolando Claros DO    ESTIMATED BLOOD LOSS: Minimal, 200 mL of old blood/hematoma    ANESTHESIA: MAC + local     INDICATIONS FOR PROCEDURE: : Cecile Lim presents with a history of an enlarging, tender left back mass after excision of a lipoma. After discussion was held with the patient regarding indications, risks, benefits and alternatives, benefits, and risks, her questions were addressed and she understood and wished to proceed. Specific risks discussed included bleeding, infection, seroma, need for additional treatment, nontherapeutic intervention, wound complication (such as dehiscence), recurrence, and rare complications related to surgery and/or anesthesia such as venous thromboembolism and cardiorespiratory complications.     PROCEDURE: After informed consent was obtained, the patient was brought to the operating room and placed in the lateral position on the Operating Room table. Anesthesia was then administered. The surgical site was then prepped and draped in the typical sterile fashion. A time-out was performed to verify patient and procedure.      Local anesthetic was delivered, and previous incision was opened with a 15 blade.  A large hematoma was encountered and evacuated. Approximately 200 mL of hematoma was evacuated.  This was irrigated copiously until clear.  The wound bed was examined and no active bleeding was observed.  A 15 blade was used to make a stab incision laterally and a 10 Fr flat IBAN drain was brought into the wound.  This was manicured to size and secured with a 2-0 Nylon suture.      The subcutaneous tissue was closed in layers using running 3-0 Vicryl suture, followed by interrupted 3-0 vicryl and skin was closed using a subcuticular suture of 4-0 Monocryl. The skin was cleansed and  dried, before administration of sterile glue.  A compressive dressing was applied      The patient tolerated the procedure well, was allowed to recover and was transferred to the recovery room in stable condition.    Rolando Claros DO on 12/7/2018 at 2:31 PM

## 2018-12-07 NOTE — H&P (VIEW-ONLY)
Surgical Consultation/History and Physical  [facility] General Surgery    Cecile is seen in consultation for lipoma, at the request of Fawn Talley DO.    Chief Complaint:  Back Lipoma    History of Present Illness: Cecile Lim is a 37 year old female presents with back mass.  She admits it has been there or several years.  It increased size, causing her discomfort particularly when lying on it.  Denies any skin changes, drainage, previous history of lipomas.  She was previously seen 3 years ago for similar.  This is in exact same spot though larger.    Patient Active Problem List   Diagnosis     Ureteral stone     Generalized anxiety disorder     CARDIOVASCULAR SCREENING; LDL GOAL LESS THAN 160     Stress incontinence, female     Essential hypertension     Major depressive disorder, recurrent episode, in partial remission (HCC)     Lipoma of back     Encounter for surveillance of contraceptives     Past Medical History:   Diagnosis Date     LAURA (generalised anxiety disorder)      HTN (hypertension)      Suicide attempt (H) 11/2014     Past Surgical History:   Procedure Laterality Date     NO HISTORY OF SURGERY  1/2007     SLING TRANSVAGINAL N/A 4/27/2018    Procedure: SLING TRANSVAGINAL;  Pubovaginal sling ([place]);  Surgeon: Douglas Hopkins MD;  Location: [facility] OR     Family History   Problem Relation Age of Onset     Hypertension Mother      Musculoskeletal Disorder Mother      degenerative disc disease     Thyroid Disease Mother      Depression Mother      Cerebrovascular Disease Mother      Hypertension Father      Cerebrovascular Disease Father      age 40     Hypertension Maternal Grandfather      Cardiovascular Maternal Grandfather      Cancer Paternal Grandfather      lung     Asthma No family hx of      C.A.D. No family hx of      Diabetes No family hx of      Breast Cancer No family hx of      Cancer - colorectal No family hx of      Prostate Cancer No family hx of      Social History    Substance Use Topics     Smoking status: Never Smoker     Smokeless tobacco: Never Used     Alcohol use 0.0 oz/week     0 Standard drinks or equivalent per week      Comment: Rare. Maybe one drink a month      History   Drug Use No     Current Outpatient Prescriptions   Medication Sig Dispense Refill     azithromycin (ZITHROMAX) 250 MG tablet Two tablets first day, then one tablet daily for four days. 6 tablet 0     lisinopril (PRINIVIL/ZESTRIL) 10 MG tablet Take 1 tablet (10 mg) by mouth daily 90 tablet 3     methylphenidate (RITALIN) 10 MG tablet Take 1/2 - 1 tablet by mouth twice daily as needed. 60 tablet 0     venlafaxine (EFFEXOR-XR) 37.5 MG 24 hr capsule Take with 75 mg for total of 112.5 mg once daily 90 capsule 3     venlafaxine (EFFEXOR-XR) 75 MG 24 hr capsule Take 1 capsule (75 mg) by mouth daily 90 capsule 3     No Known Allergies    Review of Systems:   Constitutional - Denies fevers, weight loss, malaise, lethargy  Neuro - Denies tremors or seizures  Pulmon - Denies SOB, dyspnea, hemoptysis, chronic cough or use of an inhaler  CV - Denies CP, SOB, lower extremity edema, difficulty w/ stairs, has never used NTG  GI - Denies hematemesis, BRBPR, melena, chronic diarrhea or epigastric pain   - Denies hematuria, difficulty voiding, h/o STDs  Hematology - Denies blood clotting disorders, chronic anemias  Dermatology - No melanomas or skin cancers  Rheumatology - No h/o RA  Pysch - + Depression and anxiety    Physical Exam:  /82 (BP Location: Right arm, Patient Position: Sitting, Cuff Size: Adult Regular)  Pulse 65  Temp 98  F (36.7  C) (Oral)  Ht 1.524 m (5')  Wt 70.8 kg (156 lb)  BMI 30.47 kg/m2    Constitutional- No acute distress, well nourished, non-toxic  Eyes: Anicteric, no injection.  PERRL  ENT:  Normocephalic, atraumatic, Nose midline, moist mucus membranes  Neck - supple, no LAD  Respiratory- Clear to auscultation bilaterally, good inspiratory effort  Cardiovascular - Heart RRR, no  lift's, thrills, murmurs, rubs, or gallop.  No peripheral edema.  No clubbing.  Abdomen - Soft, non-tender, +BS, no hepatosplenomegaly, no palpable masses  Neuro - No focal neuro deficits, Alert and oriented x 3  Psych: Appropriate mood and affect  Musculoskeletal: Normal gait, symmetric strength.  FROM upper and lower extremities.  Skin: Warm, Dry.  5.0 cm x 2.0 cm subcutaneous mass left lower back.  No overlying skin changes.  POC ultrasound performed showing mass consistent with lipoma above the fascia.    Assessment:  1. Mass on back      Plan:   Mrs. Lim presents for evaluation of enlarging back mass which is causing discomfort.  She may benefit from excision.  Given size and patient's anxiety surrounding procedure, this would be best performed in the operating room.  She is able to obtain at least 4 METS of activity without shortness of breath or chest pain and is cleared for surgery.  The indications, risks, benefits and alternatives of excision of this mass were discussed with Cecile Lim. The risks discussed included but were not limited to bleeding, infection, seroma, need for additional treatment, nontherapeutic intervention, wound complication (such as dehiscence), and rare complications related to surgery and/or anesthesia such as venous thromboembolism and cardiorespiratory complications.  All of her questions were answered thoroughly and to her satisfaction, and informed consent was obtained. Consent was verified via teach back methodology.    This will be scheduled at her convenience.    Rolando Claros DO on 11/20/2018 at 3:01 PM

## 2018-12-07 NOTE — DISCHARGE INSTRUCTIONS
Same Day Surgery Discharge Instructions  Special Precautions After Surgery - Adult    1. It is not unusual to feel lightheaded or faint, up to 24 hours after surgery or while taking pain medication.  If you have these symptoms; sit for a few minutes before standing and have someone assist you when getting up.  2. You should rest and relax for the next 24 hours and must have someone stay with you for at least 24 hours after your discharge.  3. DO NOT DRIVE any vehicle or operate mechanical equipment for 24 hours following the end of your surgery.  DO NOT DRIVE while taking narcotic pain medications that have been prescribed by your physician.  If you had a limb operated on, you must be able to use it fully to drive.  4. DO NOT drink alcoholic beverages for 24 hours following surgery or while taking prescription pain medication.  5. Drink clear liquids (apple juice, ginger ale, broth, 7-Up, etc.).  Progress to your regular diet as you feel able.  6. Any questions call your physician and do not make important decisions for 24 hours.    ACTIVITY  ? Rest today.  No activity or diet restrictions.     INCISIONAL CARE  ? May remove dressing in 24 hours.     Call for an appointment to return to the clinic     Medications:  ? Hydrocodone (Norco, Vicodin):  Next dose: as directed.  ? Follow the instructions on the bottle.     Additional discharge instructions: keep track of IBAN drain amounts  __________________________________________________________________________________________________________________________________  IMPORTANT NUMBERS:    [facility] Main Number:  [phone], [phone]  Pharmacy:  [phone]  Same Day Surgery:  [phone], Monday - Friday until 8:30 p.m.  Urgent Care:  [phone]  Emergency Room:  [phone]   Call with problems or questions after clinic hours   [facility]:  [phone]   Call for appointment, problems or questions during clinic hours        HOME  "CARE FOLLOWING MASS EXCISION      DIET:  No restrictions.  Increased fluid intake is recommended. While taking pain medications, increase dietary fiber or add a fiber supplementation like Metamucil or Citrucel to help prevent constipation - a possible side effect of pain medications.    NAUSEA:  If nauseated from the anesthetic/pain meds; rest in bed, get up cautiously with assistance, and drink clear liquids (juice, tea, broth).    ACTIVITY:  Light Activity -- you may immediately be up and about as tolerated.  Driving -- you may drive when comfortable and off pain medications.  Light Work -- resume when comfortable off pain medications.  (If you can drive, you probably can work.)  Resume Regular Activity As tolerated    INCISIONAL CARE:    If you have a dressing in place, keep clean and dry for 24 hours after surgery.  After this timeframe, you may replace the gauze daily if it becomes soiled.    You may remove the dressing and shower 48 hours after surgery.  Do not submerse incision in water for 1 week.    If you have a Dermabond dressing (a type of skin glue), you may shower immediately.    Sutures will absorb and need not be removed.    If present, leave the steri-strips (white paper tapes) in place for 14 days after surgery.    If present, leave Dermabond glue in place until it wears/flakes off.    Expect a variable amount of swelling/bruising/discoloration that may appear around or below the repair site.    Some numbness around the incision is common.    A lump/\"healing ridge\" under the incision is normal and will gradually resolve over the following 1-2 months.    DISCOMFORT:  Local anesthetic placed at surgery should provide relief for 4-8 hours.  Begin taking pain pills before discomfort is severe.  Take the pain medication with some food, when possible, to minimize side effects.  Intermittent use of ice packs to the hernia repair site may help during the first 1-3 weeks after surgery.  Expect gradual " improvement.    Over-the-counter anti-inflammatory medications (i.e. Ibuprofen/Advil/Motrin or Naprosyn/Aleve) may be used per package instructions in addition to or while tapering off the narcotic pain medications to decrease swelling and sensitivity at the repair site.  DO NOT TAKE these Anti-inflammatory medications if your primary physician has advised against doing so, or if you have acid reflux, ulcer, or bleeding disorder, or take blood-thinner medications.  Call your primary physician or the surgery office if you have medication questions.      RETURN APPOINTMENT:  Schedule a follow-up visit 2 weeks post-op.  Office Phone:  [phone]     CONTACT US IF THE FOLLOWING DEVELOPS:   1. A fever that is above 101     2. If there is a large amount of drainage, bleeding, or swelling.   3. Severe pain that is not relieved by your prescription.   4. Drainage that is thick, cloudy, yellow, green or white.   5. Any other questions not answered by  Frequently Asked Questions  sheet.      FREQUENTLY ASKED QUESTIONS:    Q:  How should my incision look?    A:  Normally your incision will appear slightly swollen with light redness directly along the incision itself as it heals.  It may feel like a bump or ridge as the healing/scarring happens, and over time (3-4 months) this bump or ridge feeling should slowly go away.  In general, clear or pink watery drainage can be normal at first as your incision heals, but should decrease over time.    Q:  How do I know if my incision is infected?  A:  Look at your incision for signs of infection, like redness around the incision spreading to surrounding skin, or drainage of cloudy or foul-smelling drainage.  If you feel warm, check your temperature to see if you are running a fever.    **If any of these things occur, please notify the nurse at our office.  We may need you to come into the office for an incision check.      Q:  How do I take care of my incision?  A:  If you have a  dressing in place - Starting the day after surgery, replace the dressing 1-2 times a day until there is no further drainage from the incision.  At that time, a dressing is no longer needed.  Try to minimize tape on the skin if irritation is occurring at the tape sites.  If you have significant irritation from tape on the skin, please call the office to discuss other method of dressing your incision.    Small pieces of tape called  steri-strips  may be present directly overlying your incision; these may be removed 10 days after surgery unless otherwise specified by your surgeon.  If these tapes start to loosen at the ends, you may trim them back until they fall off or are removed.    A:  If you had  Dermabond  tissue glue used as a dressing (this causes your incision to look shiny with a clear covering over it) - This type of dressing wears off with time and does not require more dressings over the top unless it is draining around the glue as it wears off.  Do not apply ointments or lotions over the incisions until the glue has completely worn off.    Q:  There is a piece of tape or a sticky  lead  still on my skin.  Can I remove this?  A:  Sometimes the sticky  leads  used for monitoring during surgery or for evaluation in the emergency department are not all removed while you are in the hospital.  These sometimes have a tab or metal dot on them.  You can easily remove these on your own, like taking off a band-aid.  If there is a gel substance under the  lead , simply wipe/clean it off with a washcloth or paper towel.      Q:  What can I do to minimize constipation (very hard stools, or lack of stools)?  A:  Stay well hydrated.  Increase your dietary fiber intake or take a fiber supplement -with plenty of water.  Walk around frequently.  You may consider an over-the-counter stool-softener.  Your Pharmacist can assist you with choosing one that is stocked at your pharmacy.  Constipation is also one of the most common  side effects of pain medication.  If you are using pain medication, be pro-active and try to PREVENT problems with constipation by taking the steps above BEFORE constipation becomes a problem.    Q:  What do I do if I need more pain medications?  A:  Call the office to receive refills.  Be aware that certain pain meds cannot be called into a pharmacy and actually require a paper prescription.  A change may be made in your pain med as you progress thru your recovery period or if you have side effects to certain meds.    --Pain meds are NOT refilled after 5pm on weekdays, and NOT AT ALL on the weekends, so please look ahead to prevent problems.      Q:  Why am I having a hard time sleeping now that I am at home?  A:  Many medications you receive while you are in the hospital can impact your sleep for a number of days after your surgery/hospitalization.  Decreased level of activity and naps during the day may also make sleeping at night difficult.  Try to minimize day-time naps, and get up frequently during the day to walk around your home during your recovery time.  Sleep aides may be of some help, but are not recommended for long-term use.      Q:  I am having some back discomfort.  What should I do?  A:  This may be related to certain positioning that was required for your surgery, extended periods of time in bed, or other changes in your overall activity level.  You may try ice, heat, acetaminophen, or ibuprofen to treat this temporarily.  Note that many pain medications have acetaminophen in them and would state this on the prescription bottle.  Be sure not to exceed the maximum of 4000mg per day of acetaminophen.     **If the pain you are having does not resolve, is severe, or is a flare of back pain you have had on other occasions prior to surgery, please contact your primary physician for further recommendations or for an appointment to be examined at their office.    Q:  Why am I having headaches?  A:   Headaches can be caused by many things:  caffeine withdrawal, use of pain meds, dehydration, high blood pressure, lack of sleep, over-activity/exhaustion, flare-up of usual migraine headaches.  If you feel this is related to muscle tension (a band-like feeling around the head, or a pressure at the low-back of the head) you may try ice or heat to this area.  You may need to drink more fluids (try electrolyte drink like Gatorade), rest, or take your usual migraine medications.   **If your headaches do not resolve, worsen, are accompanied by other symptoms, or if your blood pressure is high, please call your primary physician for recommendation and/or examination.    Q:  I am unable to urinate.  What do I do?  A:  A small percentage of people can have difficulty urinating initially after surgery.  This includes being able to urinate only a very small amount at a time and feeling discomfort or pressure in the very low abdomen.  This is called  urinary retention , and is actually an urgent situation.  Proceed to your nearest Emergency department for evaluation (not an Urgent Care Center).  Sometimes the bladder does not work correctly after certain medications you receive during surgery, or related to certain procedures.  You may need to have a catheter placed until your bladder recovers.  When planning to go to an Emergency department, it may help to call the ER to let them know you are coming in for this problem after a surgery.  This may help you get in quicker to be evaluated.  **If you have symptoms of a urinary tract infection, please contact your primary physician for the proper evaluation and treatment.          If you have other questions, please call the office Monday thru Friday between 8am and 5pm to discuss with the nurse.  #[phone]    There is a surgeon ON CALL on weekday evenings and over the weekend in case of urgent need only, and may be contacted at the same number.    If you are having an  emergency, call 911 or proceed to your nearest emergency department.

## 2018-12-07 NOTE — IP AVS SNAPSHOT
[facility]/Phase II    [address] [facility] [address]    Phone:  [phone]    Fax:  [phone]                                       After Visit Summary   12/7/2018    Cecile Lim    MRN: [id number]           After Visit Summary Signature Page     I have received my discharge instructions, and my questions have been answered. I have discussed any challenges I see with this plan with the nurse or doctor.    ..........................................................................................................................................  Patient/Patient Representative Signature      ..........................................................................................................................................  Patient Representative Print Name and Relationship to Patient    ..................................................               ................................................  Date                                   Time    ..........................................................................................................................................  Reviewed by Signature/Title    ...................................................              ..............................................  Date                                               Time          22EPIC Rev 08/18

## 2018-12-07 NOTE — IP AVS SNAPSHOT
MRN:[id number]                      After Visit Summary   12/7/2018    Cecile Lim    MRN: [id number]           Thank you!     Thank you for choosing [facility] for your care. Our goal is always to provide you with excellent care. Hearing back from our patients is one way we can continue to improve our services. Please take a few minutes to complete the written survey that you may receive in the mail after you visit with us. Thank you!        Patient Information     Date Of Birth          1980        About your hospital stay     You were admitted on:  December 7, 2018 You last received care in the:  [facility] PreOP/Phase II    You were discharged on:  December 7, 2018       Who to Call     For medical emergencies, please call 911.  For non-urgent questions about your medical care, please call your primary care provider or clinic, [phone]  For questions related to your surgery, please call your surgery clinic        Attending Provider     Provider Rolando Gamino DO Surgery       Primary Care Provider Office Phone # Fax #    Fawn Henderson DO [place] [phone]      After Care Instructions     Diet Instructions       Resume pre-procedure diet            Discharge Instructions       Patient to follow up with appointment in 7-10 days            Dressing       Keep dressing clean and dry.  Remove outer dressing in 24 hours            No Alcohol       For 24 hours post procedure            No driving or operating machinery        until off pain medications            Weight bearing status - As tolerated                 Your next 10 appointments already scheduled     Dec 13, 2018  3:45 PM CST   [facility] with Rolando Claros DO   [facility]    [address] [facility] [address] [id number] [phone]              Further instructions from your care team                         Same Day Surgery  Discharge Instructions  Special Precautions After Surgery - Adult    1. It is not unusual to feel lightheaded or faint, up to 24 hours after surgery or while taking pain medication.  If you have these symptoms; sit for a few minutes before standing and have someone assist you when getting up.  2. You should rest and relax for the next 24 hours and must have someone stay with you for at least 24 hours after your discharge.  3. DO NOT DRIVE any vehicle or operate mechanical equipment for 24 hours following the end of your surgery.  DO NOT DRIVE while taking narcotic pain medications that have been prescribed by your physician.  If you had a limb operated on, you must be able to use it fully to drive.  4. DO NOT drink alcoholic beverages for 24 hours following surgery or while taking prescription pain medication.  5. Drink clear liquids (apple juice, ginger ale, broth, 7-Up, etc.).  Progress to your regular diet as you feel able.  6. Any questions call your physician and do not make important decisions for 24 hours.    ACTIVITY  ? Rest today.  No activity or diet restrictions.     INCISIONAL CARE  ? May remove dressing in 24 hours.     Call for an appointment to return to the clinic     Medications:  ? Hydrocodone (Norco, Vicodin):  Next dose: as directed.  ? Follow the instructions on the bottle.     Additional discharge instructions: keep track of IBAN drain amounts  __________________________________________________________________________________________________________________________________  IMPORTANT NUMBERS:    [facility] [place] [place] Main Number:  [phone], [phone]  Pharmacy:  [phone]  Same Day Surgery:  [phone], Monday - Friday until 8:30 p.m.  Urgent Care:  [phone]  Emergency Room:  [phone]   Call with problems or questions after clinic hours   Surgery Specialty Clinic:  [phone]   Call for appointment, problems or questions during clinic hours        HOME CARE FOLLOWING MASS  "EXCISION      DIET:  No restrictions.  Increased fluid intake is recommended. While taking pain medications, increase dietary fiber or add a fiber supplementation like Metamucil or Citrucel to help prevent constipation - a possible side effect of pain medications.    NAUSEA:  If nauseated from the anesthetic/pain meds; rest in bed, get up cautiously with assistance, and drink clear liquids (juice, tea, broth).    ACTIVITY:  Light Activity -- you may immediately be up and about as tolerated.  Driving -- you may drive when comfortable and off pain medications.  Light Work -- resume when comfortable off pain medications.  (If you can drive, you probably can work.)  Resume Regular Activity As tolerated    INCISIONAL CARE:    If you have a dressing in place, keep clean and dry for 24 hours after surgery.  After this timeframe, you may replace the gauze daily if it becomes soiled.    You may remove the dressing and shower 48 hours after surgery.  Do not submerse incision in water for 1 week.    If you have a Dermabond dressing (a type of skin glue), you may shower immediately.    Sutures will absorb and need not be removed.    If present, leave the steri-strips (white paper tapes) in place for 14 days after surgery.    If present, leave Dermabond glue in place until it wears/flakes off.    Expect a variable amount of swelling/bruising/discoloration that may appear around or below the repair site.    Some numbness around the incision is common.    A lump/\"healing ridge\" under the incision is normal and will gradually resolve over the following 1-2 months.    DISCOMFORT:  Local anesthetic placed at surgery should provide relief for 4-8 hours.  Begin taking pain pills before discomfort is severe.  Take the pain medication with some food, when possible, to minimize side effects.  Intermittent use of ice packs to the hernia repair site may help during the first 1-3 weeks after surgery.  Expect gradual improvement.  "   Over-the-counter anti-inflammatory medications (i.e. Ibuprofen/Advil/Motrin or Naprosyn/Aleve) may be used per package instructions in addition to or while tapering off the narcotic pain medications to decrease swelling and sensitivity at the repair site.  DO NOT TAKE these Anti-inflammatory medications if your primary physician has advised against doing so, or if you have acid reflux, ulcer, or bleeding disorder, or take blood-thinner medications.  Call your primary physician or the surgery office if you have medication questions.      RETURN APPOINTMENT:  Schedule a follow-up visit 2 weeks post-op.  Office Phone:  [phone]     CONTACT US IF THE FOLLOWING DEVELOPS:   1. A fever that is above 101     2. If there is a large amount of drainage, bleeding, or swelling.   3. Severe pain that is not relieved by your prescription.   4. Drainage that is thick, cloudy, yellow, green or white.   5. Any other questions not answered by  Frequently Asked Questions  sheet.      FREQUENTLY ASKED QUESTIONS:    Q:  How should my incision look?    A:  Normally your incision will appear slightly swollen with light redness directly along the incision itself as it heals.  It may feel like a bump or ridge as the healing/scarring happens, and over time (3-4 months) this bump or ridge feeling should slowly go away.  In general, clear or pink watery drainage can be normal at first as your incision heals, but should decrease over time.    Q:  How do I know if my incision is infected?  A:  Look at your incision for signs of infection, like redness around the incision spreading to surrounding skin, or drainage of cloudy or foul-smelling drainage.  If you feel warm, check your temperature to see if you are running a fever.    **If any of these things occur, please notify the nurse at our office.  We may need you to come into the office for an incision check.      Q:  How do I take care of my incision?  A:  If you have a dressing in place  - Starting the day after surgery, replace the dressing 1-2 times a day until there is no further drainage from the incision.  At that time, a dressing is no longer needed.  Try to minimize tape on the skin if irritation is occurring at the tape sites.  If you have significant irritation from tape on the skin, please call the office to discuss other method of dressing your incision.    Small pieces of tape called  steri-strips  may be present directly overlying your incision; these may be removed 10 days after surgery unless otherwise specified by your surgeon.  If these tapes start to loosen at the ends, you may trim them back until they fall off or are removed.    A:  If you had  Dermabond  tissue glue used as a dressing (this causes your incision to look shiny with a clear covering over it) - This type of dressing wears off with time and does not require more dressings over the top unless it is draining around the glue as it wears off.  Do not apply ointments or lotions over the incisions until the glue has completely worn off.    Q:  There is a piece of tape or a sticky  lead  still on my skin.  Can I remove this?  A:  Sometimes the sticky  leads  used for monitoring during surgery or for evaluation in the emergency department are not all removed while you are in the hospital.  These sometimes have a tab or metal dot on them.  You can easily remove these on your own, like taking off a band-aid.  If there is a gel substance under the  lead , simply wipe/clean it off with a washcloth or paper towel.      Q:  What can I do to minimize constipation (very hard stools, or lack of stools)?  A:  Stay well hydrated.  Increase your dietary fiber intake or take a fiber supplement -with plenty of water.  Walk around frequently.  You may consider an over-the-counter stool-softener.  Your Pharmacist can assist you with choosing one that is stocked at your pharmacy.  Constipation is also one of the most common side effects of  pain medication.  If you are using pain medication, be pro-active and try to PREVENT problems with constipation by taking the steps above BEFORE constipation becomes a problem.    Q:  What do I do if I need more pain medications?  A:  Call the office to receive refills.  Be aware that certain pain meds cannot be called into a pharmacy and actually require a paper prescription.  A change may be made in your pain med as you progress thru your recovery period or if you have side effects to certain meds.    --Pain meds are NOT refilled after 5pm on weekdays, and NOT AT ALL on the weekends, so please look ahead to prevent problems.      Q:  Why am I having a hard time sleeping now that I am at home?  A:  Many medications you receive while you are in the hospital can impact your sleep for a number of days after your surgery/hospitalization.  Decreased level of activity and naps during the day may also make sleeping at night difficult.  Try to minimize day-time naps, and get up frequently during the day to walk around your home during your recovery time.  Sleep aides may be of some help, but are not recommended for long-term use.      Q:  I am having some back discomfort.  What should I do?  A:  This may be related to certain positioning that was required for your surgery, extended periods of time in bed, or other changes in your overall activity level.  You may try ice, heat, acetaminophen, or ibuprofen to treat this temporarily.  Note that many pain medications have acetaminophen in them and would state this on the prescription bottle.  Be sure not to exceed the maximum of 4000mg per day of acetaminophen.     **If the pain you are having does not resolve, is severe, or is a flare of back pain you have had on other occasions prior to surgery, please contact your primary physician for further recommendations or for an appointment to be examined at their office.    Q:  Why am I having headaches?  A:  Headaches can be caused  by many things:  caffeine withdrawal, use of pain meds, dehydration, high blood pressure, lack of sleep, over-activity/exhaustion, flare-up of usual migraine headaches.  If you feel this is related to muscle tension (a band-like feeling around the head, or a pressure at the low-back of the head) you may try ice or heat to this area.  You may need to drink more fluids (try electrolyte drink like Gatorade), rest, or take your usual migraine medications.   **If your headaches do not resolve, worsen, are accompanied by other symptoms, or if your blood pressure is high, please call your primary physician for recommendation and/or examination.    Q:  I am unable to urinate.  What do I do?  A:  A small percentage of people can have difficulty urinating initially after surgery.  This includes being able to urinate only a very small amount at a time and feeling discomfort or pressure in the very low abdomen.  This is called  urinary retention , and is actually an urgent situation.  Proceed to your nearest Emergency department for evaluation (not an Urgent Care Center).  Sometimes the bladder does not work correctly after certain medications you receive during surgery, or related to certain procedures.  You may need to have a catheter placed until your bladder recovers.  When planning to go to an Emergency department, it may help to call the ER to let them know you are coming in for this problem after a surgery.  This may help you get in quicker to be evaluated.  **If you have symptoms of a urinary tract infection, please contact your primary physician for the proper evaluation and treatment.          If you have other questions, please call the office Monday thru Friday between 8am and 5pm to discuss with the nurse.  #[phone]    There is a surgeon ON CALL on weekday evenings and over the weekend in case of urgent need only, and may be contacted at the same number.    If you are having an emergency, call 911 or proceed  "to your nearest emergency department.        Pending Results     No orders found from 12/5/2018 to 12/8/2018.            Admission Information     Date & Time Provider Department Dept. Phone    12/7/2018 Rolando Claros DO [facility] PreOP/Phase [phone]      Your Vitals Were     Blood Pressure Pulse Temperature Respirations Height Weight    108/67 73 98.2  F (36.8  C) (Oral) 16 1.549 m (5' 1\") 71.7 kg (158 lb)    Last Period Pulse Oximetry BMI (Body Mass Index)             12/07/2018 98% 29.85 kg/m2         MyChart Information     [employer] gives you secure access to your electronic health record. If you see a primary care provider, you can also send messages to your care team and make appointments. If you have questions, please call your primary care clinic.  If you do not have a primary care provider, please call [phone] and they will assist you.        Care EveryWhere ID     This is your Care EveryWhere ID. This could be used by other organizations to access your [place] medical records  [id number]        Equal Access to Services     ISRAEL JON : Hadii delmy Camargo, wasuzette cool, qachavez dunham, krista tabor. So [facility] [phone].    ATENCIÓN: Si habla español, tiene a everett disposición servicios gratuitos de asistencia lingüística. Laurent al [phone].    We comply with applicable federal civil rights laws and [place] laws. We do not discriminate on the basis of race, color, national origin, age, disability, sex, sexual orientation, or gender identity.               Review of your medicines      CONTINUE these medicines which have NOT CHANGED        Dose / Directions    HYDROcodone-acetaminophen 5-325 MG tablet   Commonly known as:  NORCO   Used for:  Mass on back        Dose:  1-2 tablet   Take 1-2 tablets by mouth every 4 hours as needed (Moderate to Severe Pain)   Quantity:  20 tablet   Refills:  0       ibuprofen 600 MG " tablet   Commonly known as:  ADVIL/MOTRIN   Used for:  Mass on back        Dose:  600 mg   Take 1 tablet (600 mg) by mouth every 6 hours as needed for pain (mild)   Quantity:  30 tablet   Refills:  0       lisinopril 10 MG tablet   Commonly known as:  PRINIVIL/ZESTRIL   Used for:  Essential hypertension        Dose:  10 mg   Take 1 tablet (10 mg) by mouth daily   Quantity:  90 tablet   Refills:  3       methylphenidate 10 MG tablet   Commonly known as:  RITALIN   Used for:  Idiopathic hypersomnia with long sleep time        Take 1/2 - 1 tablet by mouth twice daily as needed.   Quantity:  60 tablet   Refills:  0       * venlafaxine 37.5 MG 24 hr capsule   Commonly known as:  EFFEXOR-XR   Used for:  Generalized anxiety disorder, Major depressive disorder, recurrent episode, moderate (H)        Take with 75 mg for total of 112.5 mg once daily   Quantity:  90 capsule   Refills:  3       * venlafaxine 75 MG 24 hr capsule   Commonly known as:  EFFEXOR-XR   Indication:  increasing about once per week to 150 mb   Used for:  Major depressive disorder, recurrent episode, moderate (H), Generalized anxiety disorder        Dose:  75 mg   Take 1 capsule (75 mg) by mouth daily   Quantity:  90 capsule   Refills:  3       * Notice:  This list has 2 medication(s) that are the same as other medications prescribed for you. Read the directions carefully, and ask your doctor or other care provider to review them with you.         Where to get your medicines      Some of these will need a paper prescription and others can be bought over the counter. Ask your nurse if you have questions.     Bring a paper prescription for each of these medications     HYDROcodone-acetaminophen 5-325 MG tablet                Protect others around you: Learn how to safely use, store and throw away your medicines at www.disposemymeds.org.        Information about OPIOIDS     PRESCRIPTION OPIOIDS: WHAT YOU NEED TO KNOW   We gave you an opioid (narcotic) pain  medicine. It is important to manage your pain, but opioids are not always the best choice. You should first try all the other options your care team gave you. Take this medicine for as short a time (and as few doses) as possible.    Some activities can increase your pain, such as bandage changes or therapy sessions. It may help to take your pain medicine 30 to 60 minutes before these activities. Reduce your stress by getting enough sleep, working on hobbies you enjoy and practicing relaxation or meditation. Talk to your care team about ways to manage your pain beyond prescription opioids.    These medicines have risks:    DO NOT drive when on new or higher doses of pain medicine. These medicines can affect your alertness and reaction times, and you could be arrested for driving under the influence (DUI). If you need to use opioids long-term, talk to your care team about driving.    DO NOT operate heavy machinery    DO NOT do any other dangerous activities while taking these medicines.    DO NOT drink any alcohol while taking these medicines.     If the opioid prescribed includes acetaminophen, DO NOT take with any other medicines that contain acetaminophen. Read all labels carefully. Look for the word  acetaminophen  or  Tylenol.  Ask your pharmacist if you have questions or are unsure.    You can get addicted to pain medicines, especially if you have a history of addiction (chemical, alcohol or substance dependence). Talk to your care team about ways to reduce this risk.    All opioids tend to cause constipation. Drink plenty of water and eat foods that have a lot of fiber, such as fruits, vegetables, prune juice, apple juice and high-fiber cereal. Take a laxative (Miralax, milk of magnesia, Colace, Senna) if you don t move your bowels at least every other day. Other side effects include upset stomach, sleepiness, dizziness, throwing up, tolerance (needing more of the medicine to have the same effect), physical  dependence and slowed breathing.    Store your pills in a secure place, locked if possible. We will not replace any lost or stolen medicine. If you don t finish your medicine, please throw away (dispose) as directed by your pharmacist. The [place] [employer] has more information about safe disposal: https://www.pca.[facility].us/living-green/managing-unwanted-medications             Medication List: This is a list of all your medications and when to take them. Check marks below indicate your daily home schedule. Keep this list as a reference.      Medications           Morning Afternoon Evening Bedtime As Needed    HYDROcodone-acetaminophen 5-325 MG tablet   Commonly known as:  NORCO   Take 1-2 tablets by mouth every 4 hours as needed (Moderate to Severe Pain)                                ibuprofen 600 MG tablet   Commonly known as:  ADVIL/MOTRIN   Take 1 tablet (600 mg) by mouth every 6 hours as needed for pain (mild)                                lisinopril 10 MG tablet   Commonly known as:  PRINIVIL/ZESTRIL   Take 1 tablet (10 mg) by mouth daily                                methylphenidate 10 MG tablet   Commonly known as:  RITALIN   Take 1/2 - 1 tablet by mouth twice daily as needed.                                * venlafaxine 37.5 MG 24 hr capsule   Commonly known as:  EFFEXOR-XR   Take with 75 mg for total of 112.5 mg once daily                                * venlafaxine 75 MG 24 hr capsule   Commonly known as:  EFFEXOR-XR   Take 1 capsule (75 mg) by mouth daily                                * Notice:  This list has 2 medication(s) that are the same as other medications prescribed for you. Read the directions carefully, and ask your doctor or other care provider to review them with you.              More Information        Caring for a Closed Suction Drainage Tube  A drainage tube removes fluid from around an incision. This helps prevent infection and promotes healing. The collection  "bulb at the end of the tube is squeezed and plugged to create suction. The bulb should be emptied and reset when half full to maintain adequate suction. You need to empty the bulb and clean the skin around the drain as often as your healthcare provider tells you to. Follow the steps below.     What you ll need  Have the following items ready:    Disposable gloves    Measuring cup    Record sheet    Gauze or paper towel    Sterile cotton swabs or 4\" x 4\" gauze pads    Sterile saline or soap and water       Step 1. Empty the bulb    Wash your hands and put on a new pair of disposable gloves.    Point the top of the bulb away from you and remove the stopper.    Turn the bulb upside down over a measuring cup. Squeeze the fluid into the cup. Make sure the bulb is totally empty.    Put the cup to one side. You can record the volume of liquid in the cup after you clean and reconnect the bulb in step 2.    Step 2. Clean and reconnect the bulb    Clean the top of the bulb with clean gauze or a paper towel, if needed.    Squeeze the bulb tight, and put the stopper back on the top.    Record the amount of fluid in the cup. Then, empty the cup as directed.    Step 3. Clean the site    Remove your disposable gloves and wash your hands before cleaning the site.    Put on a new pair of disposable gloves.    Wet a sterile cotton swab or 4\" x 4\" gauze pad with sterile saline or soap and water.    Gently clean the skin around the drain. Always wipe away from the incision.    Apply an antibacterial ointment if directed.   When to call your healthcare provider  Call your healthcare provider if you notice any of these changes:    The amount of fluid increases or decreases suddenly    Large amount of blood or a clot in drainage    Color, odor, or thickness of the fluid changes    Tube falls out or the incision opens    Skin around the drain is red, swollen, painful, or seeping pus    You have a fever of 100.4 F (38 C) or higher, or " "as directed by your healthcare provider     If the tube isn't draining  Here are tips to drain the tube:    Uncurl any kinks in the tube.    With one hand, firmly hold the base of the tube between your thumb and index finger. Do not touch the incision.    Put the thumb and index finger of your other hand on the tube, next to the first hand. Pinch your fingers together. Then pull them along the tube toward the bag. This will help push any clogged fluid through the tube. This is called \"stripping the tube.\" You may find it helpful to hold an alcohol swab between your fingers and the tube to lubricate the tubing.    If the tube still does not drain, call your healthcare provider.   Date Last Reviewed: 12/1/2016 2000-2018 [employer]. [address] [place] [address], [place] [address]. All rights reserved. This information is not intended as a substitute for professional medical care. Always follow your healthcare professional's instructions.             "

## 2018-12-13 ENCOUNTER — OFFICE VISIT (OUTPATIENT)
Dept: SURGERY | Facility: CLINIC | Age: 38
End: 2018-12-13
Payer: COMMERCIAL

## 2018-12-13 VITALS
HEIGHT: 61 IN | DIASTOLIC BLOOD PRESSURE: 82 MMHG | BODY MASS INDEX: 29.83 KG/M2 | WEIGHT: 158 LBS | HEART RATE: 82 BPM | TEMPERATURE: 98.5 F | SYSTOLIC BLOOD PRESSURE: 126 MMHG

## 2018-12-13 DIAGNOSIS — T14.8XXA HEMATOMA: Primary | ICD-10-CM

## 2018-12-13 PROCEDURE — 99024 POSTOP FOLLOW-UP VISIT: CPT | Performed by: SURGERY

## 2018-12-13 ASSESSMENT — MIFFLIN-ST. JEOR: SCORE: 1329.06

## 2018-12-13 NOTE — LETTER
"    12/13/2018         RE: Cecile Lim  [address] [facility] [address]        Dear Colleague,    Thank you for referring your patient, Cecile Lim, to the [facility]. Please see a copy of my visit note below.    General Surgery Post Op    Pt returns for follow up visit s/p drain placement on 12/7/2018.    Patient has been doing well, tolerating diet. Bowels moving well. Pain controlled. No issues with wound healing/redness/drainage. No fevers.  Drain has had minimal output, 15 mL per day.  Her pain is completely resolved.  Drainage is serosanguinous    Physical exam: Vitals: /82 (BP Location: Right arm, Patient Position: Sitting, Cuff Size: Adult Regular)   Pulse 82   Temp 98.5  F (36.9  C) (Tympanic)   Ht 1.549 m (5' 1\")   Wt 71.7 kg (158 lb)   LMP 12/07/2018   BMI 29.85 kg/m     BMI= Body mass index is 29.85 kg/m .    Exam:  Constitutional: healthy, alert and no distress  Cardiovascular: negative  Respiratory: negative  Gastrointestinal: negative  Incision C/D/I.  Drain with serous output.      Path:FINAL DIAGNOSIS:   Subcutaneous mass, left back, excision:   - Lipoma.      Assessment:     ICD-10-CM    1. Hematoma T14.8XXA      Plan: Mrs. Lim presents after lipoma excision with subsequent evacuation of hematoma and drain placement.  Her drain was removed today.  She is recovering well and has no pain or wound complications at this time.  We discussed continued expectations for wound healing.  She has no restrictions at this time.  She should keep the drain site clean and dressed as necessary.  She can follow-up on an as needed basis.    Rolando Claros, DO on 12/17/2018 at 7:22 AM        Again, thank you for allowing me to participate in the care of your patient.        Sincerely,        Rolando Claros, DO    "

## 2018-12-17 NOTE — PROGRESS NOTES
"General Surgery Post Op    Pt returns for follow up visit s/p drain placement on 12/7/2018.    Patient has been doing well, tolerating diet. Bowels moving well. Pain controlled. No issues with wound healing/redness/drainage. No fevers.  Drain has had minimal output, 15 mL per day.  Her pain is completely resolved.  Drainage is serosanguinous    Physical exam: Vitals: /82 (BP Location: Right arm, Patient Position: Sitting, Cuff Size: Adult Regular)   Pulse 82   Temp 98.5  F (36.9  C) (Tympanic)   Ht 1.549 m (5' 1\")   Wt 71.7 kg (158 lb)   LMP 12/07/2018   BMI 29.85 kg/m    BMI= Body mass index is 29.85 kg/m .    Exam:  Constitutional: healthy, alert and no distress  Cardiovascular: negative  Respiratory: negative  Gastrointestinal: negative  Incision C/D/I.  Drain with serous output.      Path:FINAL DIAGNOSIS:   Subcutaneous mass, left back, excision:   - Lipoma.      Assessment:     ICD-10-CM    1. Hematoma T14.8XXA      Plan: Mrs. Lim presents after lipoma excision with subsequent evacuation of hematoma and drain placement.  Her drain was removed today.  She is recovering well and has no pain or wound complications at this time.  We discussed continued expectations for wound healing.  She has no restrictions at this time.  She should keep the drain site clean and dressed as necessary.  She can follow-up on an as needed basis.    Rolando Claros, DO on 12/17/2018 at 7:22 AM      "

## 2018-12-20 ENCOUNTER — TELEPHONE (OUTPATIENT)
Dept: SURGERY | Facility: CLINIC | Age: 38
End: 2018-12-20

## 2018-12-20 NOTE — TELEPHONE ENCOUNTER
Reason for Call:  Form, our goal is to have forms completed with 72 hours, however, some forms may require a visit or additional information.    Type of letter, form or note:  FMLA    Who is the form from?: Patient    Where did the form come from: form was faxed in    What clinic location was the form placed at?: [facility] (ENT, Neurology, Rheumatology, Surgery, Urology, Vascular Surgery)    Where the form was placed: Given to MA/RN    What number is listed as a contact on the form?: [phone]       Additional comments: please complete forms and fax back to [phone]    Call taken on 12/20/2018 at 11:39 AM by Roxanne Peace

## 2018-12-21 NOTE — TELEPHONE ENCOUNTER
LM for Cecile to return a call to the clinic, have questions regarding her [place]. Need to know if she has been off work since her surgery and when she went back to work.  Britt Hallman MA

## 2018-12-21 NOTE — TELEPHONE ENCOUNTER
Pt returned call. She said the only 3 days missed from work are the ones that were listed on the form.     If further questions, contact pt @:  [phone]    Denise Behrendt  [facility] CSS

## 2019-01-09 DIAGNOSIS — G47.11 IDIOPATHIC HYPERSOMNIA WITH LONG SLEEP TIME: ICD-10-CM

## 2019-01-09 RX ORDER — METHYLPHENIDATE HYDROCHLORIDE 10 MG/1
TABLET ORAL
Qty: 60 TABLET | Refills: 0 | Status: CANCELLED | OUTPATIENT
Start: 2019-03-08

## 2019-01-09 RX ORDER — METHYLPHENIDATE HYDROCHLORIDE 10 MG/1
TABLET ORAL
Qty: 60 TABLET | Refills: 0 | Status: CANCELLED | OUTPATIENT
Start: 2019-01-09

## 2019-01-09 RX ORDER — METHYLPHENIDATE HYDROCHLORIDE 10 MG/1
TABLET ORAL
Qty: 60 TABLET | Refills: 0 | Status: CANCELLED | OUTPATIENT
Start: 2019-02-08

## 2019-01-09 NOTE — TELEPHONE ENCOUNTER
Cecile returned call and left a voicemail.   Attempted to contact Cecile. Left message on answering machine for Cecile to return call.

## 2019-01-09 NOTE — TELEPHONE ENCOUNTER
Chief Complaint   Patient presents with     Refill Request     methylphenidate (RITALIN) 10 MG tablet      Refill request received via fax by pharmacy   [facility] - - [place], [place] - [address] [facility] tel:[phone]      No prescriptions requested or ordered in this encounter         Last Written Prescription Date:  2/04/2018  Last Fill Quantity: 60 per 30 days,   # refills: 0  Last Office Visit with prescribing provider: 09/13/2018  Future Office visit:       Routing refill request to provider for review/approval because:  Drug not on the [facility], P or [facility] refill protocol or controlled substance     Attempted to contact Cecile. Left message on answering machine for Cecile to return call. Cecile received three prescriptions on 12/04/2018 dated 12/04/2018, 01/04/2019 and 02/04/2019. Called to verify/remind patient that she picked up prescriptions at .     Controlled medication agreement on file 12/04/2014

## 2019-01-11 NOTE — TELEPHONE ENCOUNTER
Incoming voicemail received by Cecile today at 12:56 pm. On the voicemail, Cecile states that she only filled one prescription and has had only one prescription. She has now been out (of the medication) for a while and asked I contact her pharmacy to confirm that she has only filled one prescriptions.     Records on 12/04/2018 show that three prescriptions were signed on 12/04/2018 dated and post dated 12/04/2018, 01/04/2019, and 02/04/2019. All three prescriptions would have been placed together and notes document they were placed at the . Cecile was notified by my chart.     I did try to call Cecile back but also got her voicemail.

## 2019-01-14 NOTE — TELEPHONE ENCOUNTER
Confirmed with clinic  that the envelope dated 12/04/2018 was received and they still have it waiting to be picked up. Cecile informed of message via telephone call.

## 2019-01-28 DIAGNOSIS — G47.11 IDIOPATHIC HYPERSOMNIA WITH LONG SLEEP TIME: ICD-10-CM

## 2019-01-28 RX ORDER — METHYLPHENIDATE HYDROCHLORIDE 10 MG/1
TABLET ORAL
Qty: 60 TABLET | Refills: 0 | Status: SHIPPED | OUTPATIENT
Start: 2019-04-04 | End: 2019-07-01

## 2019-01-28 RX ORDER — METHYLPHENIDATE HYDROCHLORIDE 10 MG/1
TABLET ORAL
Qty: 60 TABLET | Refills: 0 | Status: SHIPPED | OUTPATIENT
Start: 2019-03-04 | End: 2019-07-01

## 2019-01-28 RX ORDER — METHYLPHENIDATE HYDROCHLORIDE 10 MG/1
TABLET ORAL
Qty: 60 TABLET | Refills: 0 | Status: SHIPPED | OUTPATIENT
Start: 2019-05-03 | End: 2019-06-30

## 2019-01-28 NOTE — TELEPHONE ENCOUNTER
Chief Complaint   Patient presents with     Refill Request     methylphenidate (RITALIN) 10 MG tablet      .    Pending Prescriptions:                       Disp   Refills    methylphenidate (RITALIN) 10 MG tablet    60 tab*0            Sig: Take 1/2 - 1 tablet by mouth twice daily as           needed.    methylphenidate (RITALIN) 10 MG tablet    60 tab*0            Sig: Take 1/2 - 1 tablet by mouth twice daily as           needed.    methylphenidate (RITALIN) 10 MG tablet    60 tab*0            Sig: Take 1/2 - 1 tablet by mouth twice daily as           needed.         Last Written Prescription Date:  02/04/2019  Last Fill Quantity: 60 per 30 days,   # refills: 0  Last Office Visit with prescribing provider: 09/13/2018  Future Office visit:       Routing refill request to provider for review/approval because:  Drug not on the [facility], P or [facility] refill protocol or controlled substance

## 2019-02-05 NOTE — TELEPHONE ENCOUNTER
Prescriptions dated MAR 04, APR 04, AND MAY 03 placed in envelope for  to deliver by mail to    [facility]   ATTN PHARMACIST  [id number] [facility] [address]

## 2019-02-22 DIAGNOSIS — F41.1 GENERALIZED ANXIETY DISORDER: Chronic | ICD-10-CM

## 2019-02-22 DIAGNOSIS — F33.1 MAJOR DEPRESSIVE DISORDER, RECURRENT EPISODE, MODERATE (H): Chronic | ICD-10-CM

## 2019-02-22 DIAGNOSIS — I10 ESSENTIAL HYPERTENSION: ICD-10-CM

## 2019-02-22 RX ORDER — VENLAFAXINE HYDROCHLORIDE 75 MG/1
75 CAPSULE, EXTENDED RELEASE ORAL DAILY
Qty: 90 CAPSULE | Refills: 0 | Status: SHIPPED | OUTPATIENT
Start: 2019-02-22 | End: 2019-04-10

## 2019-02-22 RX ORDER — VENLAFAXINE HYDROCHLORIDE 37.5 MG/1
CAPSULE, EXTENDED RELEASE ORAL
Qty: 90 CAPSULE | Refills: 0 | Status: SHIPPED | OUTPATIENT
Start: 2019-02-22 | End: 2019-04-10

## 2019-02-22 RX ORDER — LISINOPRIL 10 MG/1
10 TABLET ORAL DAILY
Qty: 90 TABLET | Refills: 0 | Status: SHIPPED | OUTPATIENT
Start: 2019-02-22 | End: 2019-04-10

## 2019-02-22 NOTE — TELEPHONE ENCOUNTER
"All meds:  Last Written Prescription Date:  2/23/2018  Last Fill Quantity: 90,  # refills: 3   Last office visit: 10/5/2018 with prescribing provider:  10/5/2018 with [place]    Future Office Visit:      PHQ-9 SCORE 6/6/2017 2/23/2018 10/5/2018   PHQ-9 Total Score - - -   PHQ-9 Total Score MyChart - - -   PHQ-9 Total Score 5 8 2     LAURA-7 SCORE 3/10/2016 1/5/2017 10/5/2018   Total Score - - -   Total Score - - -   Total Score 7 5 6       Requested Prescriptions   Pending Prescriptions Disp Refills     venlafaxine (EFFEXOR-XR) 37.5 MG 24 hr capsule 90 capsule 3     Sig: Take with 75 mg for total of 112.5 mg once daily    Serotonin-Norepinephrine Reuptake Inhibitors  Passed - 2/22/2019 10:42 AM       Passed - Blood pressure under 140/90 in past 12 months    BP Readings from Last 3 Encounters:   12/13/18 126/82   12/07/18 104/70   12/04/18 128/81                Passed - PHQ-9 score of less than 5 in past 6 months    Please review last PHQ-9 score.          Passed - Medication is active on med list       Passed - Patient is age 18 or older       Passed - No active pregnancy on record       Passed - Normal serum creatinine on file in past 12 months    Recent Labs   Lab Test 04/11/18  1615   CR 0.57            Passed - No positive pregnancy test in past 12 months       Passed - Recent (6 mo) or future (30 days) visit within the authorizing provider's specialty    Patient had office visit in the last 6 months or has a visit in the next 30 days with authorizing provider or within the authorizing provider's specialty.  See \"Patient Info\" tab in inbasket, or \"Choose Columns\" in Meds & Orders section of the refill encounter.            venlafaxine (EFFEXOR-XR) 75 MG 24 hr capsule 90 capsule 3     Sig: Take 1 capsule (75 mg) by mouth daily    Serotonin-Norepinephrine Reuptake Inhibitors  Passed - 2/22/2019 10:42 AM       Passed - Blood pressure under 140/90 in past 12 months    BP Readings from Last 3 Encounters:   12/13/18 " "126/82   12/07/18 104/70   12/04/18 128/81                Passed - PHQ-9 score of less than 5 in past 6 months    Please review last PHQ-9 score.          Passed - Medication is active on med list       Passed - Patient is age 18 or older       Passed - No active pregnancy on record       Passed - Normal serum creatinine on file in past 12 months    Recent Labs   Lab Test 04/11/18  1615   CR 0.57            Passed - No positive pregnancy test in past 12 months       Passed - Recent (6 mo) or future (30 days) visit within the authorizing provider's specialty    Patient had office visit in the last 6 months or has a visit in the next 30 days with authorizing provider or within the authorizing provider's specialty.  See \"Patient Info\" tab in inbasket, or \"Choose Columns\" in Meds & Orders section of the refill encounter.            lisinopril (PRINIVIL/ZESTRIL) 10 MG tablet 90 tablet 3     Sig: Take 1 tablet (10 mg) by mouth daily    ACE Inhibitors (Including Combos) Protocol Passed - 2/22/2019 10:42 AM       Passed - Blood pressure under 140/90 in past 12 months    BP Readings from Last 3 Encounters:   12/13/18 126/82   12/07/18 104/70   12/04/18 128/81                Passed - Recent (12 mo) or future (30 days) visit within the authorizing provider's specialty    Patient had office visit in the last 12 months or has a visit in the next 30 days with authorizing provider or within the authorizing provider's specialty.  See \"Patient Info\" tab in inbasket, or \"Choose Columns\" in Meds & Orders section of the refill encounter.             Passed - Medication is active on med list       Passed - Patient is age 18 or older       Passed - No active pregnancy on record       Passed - Normal serum creatinine on file in past 12 months    Recent Labs   Lab Test 04/11/18  1615   CR 0.57            Passed - Normal serum potassium on file in past 12 months    Recent Labs   Lab Test 04/11/18  1615   POTASSIUM 4.0            Passed - No " positive pregnancy test within past 12 months

## 2019-03-14 ENCOUNTER — TELEPHONE (OUTPATIENT)
Dept: FAMILY MEDICINE | Facility: CLINIC | Age: 39
End: 2019-03-14

## 2019-04-10 ENCOUNTER — OFFICE VISIT (OUTPATIENT)
Dept: FAMILY MEDICINE | Facility: CLINIC | Age: 39
End: 2019-04-10
Payer: COMMERCIAL

## 2019-04-10 VITALS
HEART RATE: 72 BPM | OXYGEN SATURATION: 98 % | HEIGHT: 61 IN | WEIGHT: 164 LBS | TEMPERATURE: 98.3 F | SYSTOLIC BLOOD PRESSURE: 124 MMHG | BODY MASS INDEX: 30.96 KG/M2 | DIASTOLIC BLOOD PRESSURE: 76 MMHG | RESPIRATION RATE: 12 BRPM

## 2019-04-10 DIAGNOSIS — I10 ESSENTIAL HYPERTENSION: ICD-10-CM

## 2019-04-10 DIAGNOSIS — F33.1 MAJOR DEPRESSIVE DISORDER, RECURRENT EPISODE, MODERATE (H): Primary | Chronic | ICD-10-CM

## 2019-04-10 DIAGNOSIS — F41.1 GENERALIZED ANXIETY DISORDER: Chronic | ICD-10-CM

## 2019-04-10 DIAGNOSIS — L73.9 FOLLICULITIS: ICD-10-CM

## 2019-04-10 LAB
ANION GAP SERPL CALCULATED.3IONS-SCNC: 6 MMOL/L (ref 3–14)
BUN SERPL-MCNC: 14 MG/DL (ref 7–30)
CALCIUM SERPL-MCNC: 8.6 MG/DL (ref 8.5–10.1)
CHLORIDE SERPL-SCNC: 104 MMOL/L (ref 94–109)
CO2 SERPL-SCNC: 23 MMOL/L (ref 20–32)
CREAT SERPL-MCNC: 0.55 MG/DL (ref 0.52–1.04)
GFR SERPL CREATININE-BSD FRML MDRD: >90 ML/MIN/{1.73_M2}
GLUCOSE SERPL-MCNC: 148 MG/DL (ref 70–99)
POTASSIUM SERPL-SCNC: 3.7 MMOL/L (ref 3.4–5.3)
SODIUM SERPL-SCNC: 133 MMOL/L (ref 133–144)

## 2019-04-10 PROCEDURE — 99214 OFFICE O/P EST MOD 30 MIN: CPT | Performed by: INTERNAL MEDICINE

## 2019-04-10 PROCEDURE — 36415 COLL VENOUS BLD VENIPUNCTURE: CPT | Performed by: INTERNAL MEDICINE

## 2019-04-10 PROCEDURE — 80048 BASIC METABOLIC PNL TOTAL CA: CPT | Performed by: INTERNAL MEDICINE

## 2019-04-10 RX ORDER — LISINOPRIL 10 MG/1
10 TABLET ORAL DAILY
Qty: 90 TABLET | Refills: 3 | Status: SHIPPED | OUTPATIENT
Start: 2019-04-10 | End: 2020-05-25

## 2019-04-10 RX ORDER — VENLAFAXINE HYDROCHLORIDE 37.5 MG/1
CAPSULE, EXTENDED RELEASE ORAL
Qty: 90 CAPSULE | Refills: 3 | Status: SHIPPED | OUTPATIENT
Start: 2019-04-10 | End: 2020-05-25

## 2019-04-10 RX ORDER — VENLAFAXINE HYDROCHLORIDE 75 MG/1
75 CAPSULE, EXTENDED RELEASE ORAL DAILY
Qty: 90 CAPSULE | Refills: 3 | Status: SHIPPED | OUTPATIENT
Start: 2019-04-10 | End: 2020-05-25

## 2019-04-10 ASSESSMENT — ANXIETY QUESTIONNAIRES
6. BECOMING EASILY ANNOYED OR IRRITABLE: MORE THAN HALF THE DAYS
1. FEELING NERVOUS, ANXIOUS, OR ON EDGE: MORE THAN HALF THE DAYS
2. NOT BEING ABLE TO STOP OR CONTROL WORRYING: MORE THAN HALF THE DAYS
GAD7 TOTAL SCORE: 14
5. BEING SO RESTLESS THAT IT IS HARD TO SIT STILL: MORE THAN HALF THE DAYS
3. WORRYING TOO MUCH ABOUT DIFFERENT THINGS: MORE THAN HALF THE DAYS
IF YOU CHECKED OFF ANY PROBLEMS ON THIS QUESTIONNAIRE, HOW DIFFICULT HAVE THESE PROBLEMS MADE IT FOR YOU TO DO YOUR WORK, TAKE CARE OF THINGS AT HOME, OR GET ALONG WITH OTHER PEOPLE: SOMEWHAT DIFFICULT
7. FEELING AFRAID AS IF SOMETHING AWFUL MIGHT HAPPEN: MORE THAN HALF THE DAYS

## 2019-04-10 ASSESSMENT — PATIENT HEALTH QUESTIONNAIRE - PHQ9
5. POOR APPETITE OR OVEREATING: MORE THAN HALF THE DAYS
SUM OF ALL RESPONSES TO PHQ QUESTIONS 1-9: 14

## 2019-04-10 ASSESSMENT — MIFFLIN-ST. JEOR: SCORE: 1351.28

## 2019-04-10 NOTE — PATIENT INSTRUCTIONS
1. Refills given. Labs today.  2. We discussed mammogram, and you declined for now.       For weight loss:  --Exercise  for 30-60 minutes most days of the week.  --Make sure the exercise is getting your heart rate up.  For example, if you weigh 150 lbs, walking the dog at 3 mph (moderate pace) for 30 minutes only burns 110 calories!  --To lose 1 lb per week, you need to have a calorie deficit of at least 500 calories per day  --Snacks in between meals should be fruits and vegetables.  --No eating after dinner.  --Avoid sodas and energy drinks.  --Avoid alcohol.  --Avoid fast food and fried foods.  --Increase whole grains in diet.  --Increase fiber to 25 grams daily.  --Free diet website:  Www.[employer]ktoPRO    Weight Loss Apps:  1. Lose It!  2. My Fitness Pal - connects to Fit Bit  3. Fooducate - can scan bar codes of foods with your phone  4. [employer] - exercise videos for all skill levels  5. 7 Minute Workout Janell    Estimate Calories Burned:  1. Http://www.[employer].Science Exchange/calculate/cbc  2. Http://www.judo.com/exercise/lookup

## 2019-04-11 ASSESSMENT — ANXIETY QUESTIONNAIRES: GAD7 TOTAL SCORE: 14

## 2019-06-30 ENCOUNTER — MYC REFILL (OUTPATIENT)
Dept: SLEEP MEDICINE | Facility: CLINIC | Age: 39
End: 2019-06-30

## 2019-06-30 DIAGNOSIS — G47.11 IDIOPATHIC HYPERSOMNIA WITH LONG SLEEP TIME: ICD-10-CM

## 2019-07-01 RX ORDER — METHYLPHENIDATE HYDROCHLORIDE 10 MG/1
TABLET ORAL
Qty: 60 TABLET | Refills: 0 | Status: SHIPPED | OUTPATIENT
Start: 2019-07-31 | End: 2019-09-20

## 2019-07-01 RX ORDER — METHYLPHENIDATE HYDROCHLORIDE 10 MG/1
TABLET ORAL
Qty: 60 TABLET | Refills: 0 | Status: SHIPPED | OUTPATIENT
Start: 2019-07-01 | End: 2019-09-20

## 2019-07-01 RX ORDER — METHYLPHENIDATE HYDROCHLORIDE 10 MG/1
TABLET ORAL
Qty: 60 TABLET | Refills: 0 | Status: SHIPPED | OUTPATIENT
Start: 2019-08-30 | End: 2019-09-20

## 2019-07-01 NOTE — TELEPHONE ENCOUNTER
Chief Complaint   Patient presents with     Refill Request     methylphenidate (RITALIN) 10 MG tablet      Refill request received via [employer] by patient or caregiver           Cecile requests to  prescriptions at clinic .    Pending Prescriptions:                       Disp   Refills    methylphenidate (RITALIN) 10 MG tablet    60 tab*0            Sig: Take 1/2 - 1 tablet by mouth twice daily as           needed.    methylphenidate (RITALIN) 10 MG tablet    60 tab*0            Sig: Take 1/2 - 1 tablet by mouth twice daily as           needed.    methylphenidate (RITALIN) 10 MG tablet    60 tab*0            Sig: Take 1/2 - 1 tablet by mouth twice daily as           needed.         Last Written Prescription Date:  5/3/19  Last Fill Quantity: 60,   # refills: 0  Last Office Visit with prescribing provider: 9/13/18  Future Office visit: 9/2019      Routing refill request to provider for review/approval because:  Drug not on the [facility], P or [facility] refill protocol or controlled substance    
Written RX's for Methylphenidate (dated 7/1/19; 7/31/19; 8/30/19) have been placed at the [facility] for . LVM notifying pt and reminded her to bring a photo ID.    Marina Costa CMA    
admission

## 2019-09-05 ENCOUNTER — TELEPHONE (OUTPATIENT)
Dept: INTERNAL MEDICINE | Facility: CLINIC | Age: 39
End: 2019-09-05

## 2019-09-05 NOTE — TELEPHONE ENCOUNTER
Panel Management Review      Patient has the following on her problem list:     Depression / Dysthymia review    Measure:  Needs PHQ-9 score of 4 or less during index window.  Administer PHQ-9 and if score is 5 or more, send encounter to provider for next steps.    5 - 7 month window range: 8/11/19 TO 12/9/19    PHQ-9 SCORE 2/23/2018 10/5/2018 4/10/2019   PHQ-9 Total Score - - -   PHQ-9 Total Score MyChart - - -   PHQ-9 Total Score 8 2 14       If PHQ-9 recheck is 5 or more, route to provider for next steps.    Patient is due for:  PHQ9    Action needed:   Patient needs to do PHQ9.    Type of outreach:    Sent [employer] message.    Questions for provider review:    None                                                                                                                                    Britney Nice CMA (AAMA)   (aka: Carola Nice)       Chart routed to Care Team .

## 2019-09-09 ENCOUNTER — VIRTUAL VISIT (OUTPATIENT)
Dept: FAMILY MEDICINE | Facility: OTHER | Age: 39
End: 2019-09-09

## 2019-09-10 NOTE — PROGRESS NOTES
"Date:   Clinician: Marina Al  Clinician NPI: [id number]  Patient: Cecile Lim  Patient : 1980  Patient Address: [address] [place], [place] [address]  Patient Phone: [phone]  Visit Protocol: Shingles  Patient Summary:  Cecile is a 38 year old ( : 1980 ) female who initiated a Visit for suspected Herpes zoster (shingles). When asked the question \"Please sign me up to receive news, health information and promotions from [employer].\", Cecile responded \"No\".     Her symptoms started 1-3 days ago. The left side of her body is affected. The rash is located on her abdomen and back. The rash is red and includes sores.   The affected area feels like it burns, painful, numb, itchy, warm to touch, and tender to touch. The symptoms do not interfere with her sleep.   Symptom details   Pain: The pain is mild (between 1-3 on a 10 point pain scale).   Denied symptoms include crusts, dry skin, flaky skin, scaly skin, drainage, scabs, and blisters. She did not experience any pain or unusual sensations in the location of the rash before it appeared. Cecile does not feel feverish.   Pertinent medical history  Cecile has had chickenpox, but has not had shingles in the past.     Cecile has ongoing medical conditions. Ongoing medical conditions as reported by the patient (free text): HTN, anxiety, depression   She denies pregnancy and denies breastfeeding. She has menstruated in the past month.   Cecile does not smoke or use smokeless tobacco.   Additional information as reported by the patient (free text): I have pictures     MEDICATIONS: venlafaxine oral, lisinopril oral, ALLERGIES: NKDA  Clinician Response:  Dear Cecile,  Based on the information provided, you have Herpes Zoster (shingles). Shingles is a blistered rash caused by the same virus that causes chickenpox - Varicella Zoster.  Everyone who comes down with shingles has had chickenpox at some time in their life. After recovering from chickenpox, the " inactive virus remains in the nerve cells. Shingles develops if the virus becomes active. This reactivation can happen years or even decades later.   Because the virus spreads along a nerve, the rash is painful and appears as a stripe along one side of the body. The rash contains groups of blisters that break, crust over, and resolve within 3-6 weeks. Although rare, it is possible for the pain to last weeks after the rash has completely healed.  Medication information  I am prescribing:     Acyclovir (Zovirax) 800 mg oral tablet. Take 1 tablet by mouth 5 times per day for 7 days. There are no refills with this prescription.  Medication is used to help speed up the healing process, but may not take your symptoms away completely.  Self care  Shingles is not spread from one person to another. However, because shingles and chickenpox are caused by the same virus, you could spread chickenpox to someone who has not had the illness or been vaccinated against it. Cover the rash with a loose bandage until all blisters scab over to prevent spreading the virus to those at risk for getting chickenpox.  Steps you can take to be as comfortable as possible:     Avoid touching the rash    Apply a cool, wet washcloth to your rash for 15 minutes several times a day    Take a lukewarm bath to soothe the skin. Adding colloidal oatmeal can help even more    Choose clothing and bedding made of a breathable material like cotton    Do not use antibiotic creams or ointments unless recommended by a provider     When to seek care  Please make an appointment to be seen in a clinic or go to an urgent care if any of the following occur:     Your rash doesn't improve after 14 days    You develop new symptoms or your symptoms become worse    Symptoms are so severe that you are unable to sleep or do regular activities    You are still experiencing pain one month after your shingles rash has completely healed    You notice symptoms of a skin  infection (spreading redness, pain that is not improving, fevers, warmth)     Seek care in an emergency room if you develop a fever, headache and sensitivity to light.   Diagnosis: Herpes zoster (shingles)  Diagnosis ICD: B02.8  Prescription: acyclovir (Zovirax) 800 mg oral tablet 35 tablet, 7 days supply. Take 1 tablet by mouth 5 times per day for 7 days. Refills: 0, Refill as needed: no, Allow substitutions: yes  Pharmacy: [facility] - - [phone] - [id number] [place] [address]

## 2019-09-20 ENCOUNTER — OFFICE VISIT (OUTPATIENT)
Dept: SLEEP MEDICINE | Facility: CLINIC | Age: 39
End: 2019-09-20
Payer: COMMERCIAL

## 2019-09-20 VITALS
BODY MASS INDEX: 32.98 KG/M2 | HEIGHT: 60 IN | DIASTOLIC BLOOD PRESSURE: 85 MMHG | HEART RATE: 75 BPM | SYSTOLIC BLOOD PRESSURE: 126 MMHG | OXYGEN SATURATION: 97 % | WEIGHT: 168 LBS

## 2019-09-20 DIAGNOSIS — G47.11 IDIOPATHIC HYPERSOMNIA WITH LONG SLEEP TIME: ICD-10-CM

## 2019-09-20 PROCEDURE — 99214 OFFICE O/P EST MOD 30 MIN: CPT | Performed by: FAMILY MEDICINE

## 2019-09-20 RX ORDER — METHYLPHENIDATE HYDROCHLORIDE 10 MG/1
TABLET ORAL
Qty: 60 TABLET | Refills: 0 | Status: SHIPPED | OUTPATIENT
Start: 2019-10-20 | End: 2021-01-14

## 2019-09-20 RX ORDER — METHYLPHENIDATE HYDROCHLORIDE 10 MG/1
TABLET ORAL
Qty: 60 TABLET | Refills: 0 | Status: SHIPPED | OUTPATIENT
Start: 2019-11-20 | End: 2020-06-02

## 2019-09-20 RX ORDER — METHYLPHENIDATE HYDROCHLORIDE 10 MG/1
TABLET ORAL
Qty: 60 TABLET | Refills: 0 | Status: SHIPPED | OUTPATIENT
Start: 2019-09-20 | End: 2021-01-14

## 2019-09-20 ASSESSMENT — MIFFLIN-ST. JEOR: SCORE: 1353.54

## 2019-09-20 NOTE — NURSING NOTE
Chief Complaint   Patient presents with     Sleep Problem     Discuss medication. She would like to increase to a full dose. She tried it and it workded better . Also would like recommendations for snoring       Initial /85   Pulse 75   Ht 1.524 m (5')   Wt 76.2 kg (168 lb)   SpO2 97%   BMI 32.81 kg/m   Estimated body mass index is 32.81 kg/m  as calculated from the following:    Height as of this encounter: 1.524 m (5').    Weight as of this encounter: 76.2 kg (168 lb).    Medication Reconciliation: complete

## 2019-09-20 NOTE — PROGRESS NOTES
Sleep Follow-Up Visit:    Date on this visit: 9/20/2019    Cecile Lim presents for follow-up of socially disruptive snoring and excessive daytime sleepiness with likely long sleep need and potential idiopathic hypersomnia.     Pertinent PMHx of HTN, obesity, LAURA, MDD.     9/6/2018 - Initial consult.  Concerns raised in regards to sleep ~1 year ago given concerns of daytime fatigue and a longer sleep need.  In retrospect, she thinks she has probably needed more sleep for a number of years, and may not necessarily be new.  She became more motivated to seek evaluation now given her snoring is incredibly loud and bothersome, as well as hearing from co-workers on how much better they feel after treating for sleep apnea.  A/P for in-lab PSG, potential long sleep need.     9/13/2018 - Patient being seen morning after study, so some detailed information has not been generated by computer system, but I reviewed the study in its entirety.  AHI ~3, baseline SpO2 95%, slick SpO2 ~89%.  Infrequent PLM's, normal arousal index.  Sleep architecture quite typical, outside of a mildly delayed REM latency.  A/P for oral appliance for snoring, trial of methylphenidate 5-10mg PO BID PRN.    Today - She presents for an annual follow-up.  She has found recently that the methylphenidate at the 10mg dose has been very effective.  Continues to allow adequate sleep time at 8-9 hours.  She is interested in information about the oral appliances for snoring.    Problem List:  Patient Active Problem List    Diagnosis Date Noted     Encounter for surveillance of contraceptives 03/10/2016     Priority: Medium      has had vasectomy       Lipoma of back 11/12/2015     Priority: Medium     Major depressive disorder, recurrent episode, in partial remission (HCC) 12/23/2014     Priority: Medium     Essential hypertension 09/18/2012     Priority: Medium     Stress incontinence, female 02/27/2012     Priority: Medium     CARDIOVASCULAR  SCREENING; LDL GOAL LESS THAN 160 10/31/2010     Priority: Medium     Generalized anxiety disorder 10/20/2010     Priority: Medium     Ureteral stone 07/09/2009     Priority: Medium        Impression/Plan:    1.)  Primary snoring  2.)  Long sleep time, potential for idiopathic hypersomnia     Discussed use of oral appliance for snoring.  Discussed importance and tools for increasing total sleep time.  Given significant symptoms, ok to continue methylphenidate 10mg PO BID PRN.    She will follow up with me in about 1 year(s).     Twenty-five minutes spent with patient, all of which were spent face-to-face counseling, consulting, coordinating plan of care.      Patrick Beach MD    CC: Fawn Talley

## 2019-09-20 NOTE — PATIENT INSTRUCTIONS
Here is our plan for today.    1.)  We will refill the methylphenidate and it is absolutely ok to take a full tablet twice a day.  You are still on a relatively low dose.    2.)  For helping with the snoring, the two mouth guards I have heard the best results with are:    Pure Sleep ([email or web address])    Zyppah ([email or web address])

## 2019-10-01 NOTE — TELEPHONE ENCOUNTER
(1st attempt) Left a voice msg for pt to call back or check my-chart.  Whenever she calls back transfer to the care team to perform PHQ9.  Britney Nice CMA (BREEZY)   (aka: Carola Nice)

## 2019-10-10 NOTE — TELEPHONE ENCOUNTER
(2nd attempt) Left a voice msg for pt to call back or check my-chart.  Whenever she calls back transfer to the care team to perform PHQ9.  Britney Nice CMA (MA)   (aka: Carola Nice)

## 2019-10-17 NOTE — TELEPHONE ENCOUNTER
(3rd attempt) Left a voice msg for pt to call back or check my-chart.  Whenever she calls back transfer to the care team to perform PHQ9.  Britney Nice CMA (MA)   (aka: Carola Nice)

## 2019-11-03 ENCOUNTER — HEALTH MAINTENANCE LETTER (OUTPATIENT)
Age: 39
End: 2019-11-03

## 2019-11-14 ENCOUNTER — TELEPHONE (OUTPATIENT)
Dept: INTERNAL MEDICINE | Facility: CLINIC | Age: 39
End: 2019-11-14

## 2019-11-14 NOTE — TELEPHONE ENCOUNTER
Panel Management Review      Patient has the following on her problem list:     Depression / Dysthymia review    Measure:  Needs PHQ-9 score of 4 or less during index window.  Administer PHQ-9 and if score is 5 or more, send encounter to provider for next steps.    5 - 7 month window range: 8/11/19 to 12/9/19    PHQ-9 SCORE 2/23/2018 10/5/2018 4/10/2019   PHQ-9 Total Score - - -   PHQ-9 Total Score MyChart - - -   PHQ-9 Total Score 8 2 14       If PHQ-9 recheck is 5 or more, route to provider for next steps.    Patient is due for:  PHQ9      Action needed:   Patient needs to do PHQ9.    Type of outreach:    Sent [employer] message.    Questions for provider review:    None                                                                                                                                    Britney Nice CMA (AAMA)   (aka: Carola Nice)       Chart routed to Care Team .

## 2019-12-17 NOTE — TELEPHONE ENCOUNTER
If patient feels her mental health is not well controlled, she can schedule a telephone or clinic visit for further discussion

## 2019-12-17 NOTE — TELEPHONE ENCOUNTER
Pt answer the questionnaire through My-Chart and score 6 with zero on the last question.  Route to the PCP as protocol.  Britney Nice CMA (BREEZY)   (aka: Carola Nice)

## 2019-12-26 NOTE — TELEPHONE ENCOUNTER
(2nd attempt) Left a voice msg for pt to call back.  Whenever she calls back transfer to the care team so the care team can address the note below.  Britney Nice CMA (BREEZY)   (aka: Carola Nice)

## 2019-12-30 NOTE — TELEPHONE ENCOUNTER
She schedule an appointment for 1/15/19 for other issues but can speak about the mental health during the office visit.  Britney Niec CMA (BREEZY)   (aka: Carola Nice)

## 2020-01-15 ENCOUNTER — OFFICE VISIT (OUTPATIENT)
Dept: FAMILY MEDICINE | Facility: CLINIC | Age: 40
End: 2020-01-15
Payer: COMMERCIAL

## 2020-01-15 VITALS
SYSTOLIC BLOOD PRESSURE: 124 MMHG | RESPIRATION RATE: 18 BRPM | WEIGHT: 169.4 LBS | HEIGHT: 60 IN | OXYGEN SATURATION: 97 % | TEMPERATURE: 97.3 F | BODY MASS INDEX: 33.26 KG/M2 | HEART RATE: 60 BPM | DIASTOLIC BLOOD PRESSURE: 82 MMHG

## 2020-01-15 DIAGNOSIS — M77.11 LATERAL EPICONDYLITIS OF RIGHT ELBOW: Primary | ICD-10-CM

## 2020-01-15 PROCEDURE — 99213 OFFICE O/P EST LOW 20 MIN: CPT | Performed by: INTERNAL MEDICINE

## 2020-01-15 ASSESSMENT — PAIN SCALES - GENERAL: PAINLEVEL: MILD PAIN (2)

## 2020-01-15 ASSESSMENT — MIFFLIN-ST. JEOR: SCORE: 1354.89

## 2020-01-15 NOTE — PROGRESS NOTES
Subjective     Cecile Lim is a 39 year old female who presents to clinic today for the following health issues:    HPI     Chief Complaint   Patient presents with     Elbow Pain     right elbow pain x1 month. Unknown cause, no previous injury. Pain with raising arm above head.      Elbow Pain    Onset: [id number]    Description:   Location: right elbow, denies radiating pain.   Character: Dull ache    Intensity: moderate, 2/10 currently     Progression of Symptoms: somewhat improving     Accompanying Signs & Symptoms:  Other symptoms: none    History:   Previous similar pain: no       Precipitating factors:   Trauma or overuse: no     Alleviating factors:Improved by: rest/inactivity    Had severe pain with raising arm above head, grasping, lifting milk jug    Does a lot of typing at work    Pain is improving, not occurring as often    Tried a generic elbow brace, more like compression sleeve.         Current Outpatient Medications   Medication Sig Dispense Refill     ibuprofen (ADVIL/MOTRIN) 600 MG tablet Take 1 tablet (600 mg) by mouth every 6 hours as needed for pain (mild) 30 tablet 0     lisinopril (PRINIVIL/ZESTRIL) 10 MG tablet Take 1 tablet (10 mg) by mouth daily 90 tablet 3     methylphenidate (RITALIN) 10 MG tablet Take 1 tablet by mouth twice daily as needed. 60 tablet 0     methylphenidate (RITALIN) 10 MG tablet Take 1 tablet by mouth twice daily as needed. 60 tablet 0     methylphenidate (RITALIN) 10 MG tablet Take 1 tablet by mouth twice daily as needed. 60 tablet 0     venlafaxine (EFFEXOR-XR) 37.5 MG 24 hr capsule Take with 75 mg for total of 112.5 mg once daily 90 capsule 3     venlafaxine (EFFEXOR-XR) 75 MG 24 hr capsule Take 1 capsule (75 mg) by mouth daily 90 capsule 3         Reviewed and updated as needed this visit by Provider         Review of Systems   ROS COMP: CONSTITUTIONAL: NEGATIVE for fever, chills, change in weight  ENT/MOUTH: NEGATIVE for ear, mouth and throat problems  RESP:  NEGATIVE for significant cough or SOB  CV: NEGATIVE for chest pain, palpitations or peripheral edema      Objective    /82   Pulse 60   Temp 97.3  F (36.3  C) (Tympanic)   Resp 18   Ht 1.524 m (5')   Wt 76.8 kg (169 lb 6.4 oz)   SpO2 97%   BMI 33.08 kg/m    Body mass index is 33.08 kg/m .  Physical Exam   GENERAL APPEARANCE: healthy, alert and no distress  ORTHO: Elbow Exam: Inspection: no swelling  Tender: lateral epicondyle  Non-tender: common extensor tendon, medial epicondyle, common flexor tendon, extensor muscle of forearm, flexor muscle of forearm, olecranon bursa and radial head/neck  Range of Motion: all normal  Strength: elbow strength full  Special tests: pain with resisted wrist extension, pain with resisted middle finger extension, pain with resisted wrist flexion, pain with resisted supination:             Assessment & Plan     1. Lateral epicondylitis of right elbow -advised brace, NSAIDs, rest, modified activity.  Advised she look at the ergonomics of her work space.  Patient education handout given        Return in about 1 month (around 2/15/2020) for If symptoms don't improve or if they worsen.    Fawn Talley, DO  [facility]

## 2020-01-15 NOTE — PATIENT INSTRUCTIONS
Patient Education     Treating Tennis Elbow    Your treatment will depend on how inflamed your tendon is. The goal is to relieve your symptoms and help you regain full use of your elbow.  Rest and medicine  Wearing a tennis elbow splint allows the inflamed tendon to rest. It must be worn properly. It should be placed down the arm past the painful area of the elbow. If it is directly over the inflamed tendon, it can worsen the symptoms. This brace can help the tendon heal. Using your other hand or changing your  also takes stress off the tendon. Oral nonsteroidal anti-inflammatory medicines (NSAIDs) and ice can relieve pain and reduce swelling.  Exercises and therapy  Your healthcare provider may give you an exercise program. He or she may refer you to a physical therapist. The physical therapist will teach you how to gently stretch and strengthen the muscles around your elbow.  Anti-inflammatory injections  Your healthcare provider may give you injections of an anti-inflammatory medicine, such as cortisone. This helps reduce swelling. You may have more pain at first. But in a few days, your elbow should feel better.  If surgery is needed  If your symptoms persist for a long time, or other treatments don t work, your healthcare provider may recommend surgery. Surgery repairs the inflamed tendon.  Date Last Reviewed: 1/1/2018 2000-2019 [email or web address]. [address] [place] [address], [place] [address]. All rights reserved. This information is not intended as a substitute for professional medical care. Always follow your healthcare professional's instructions.

## 2020-02-05 ENCOUNTER — OFFICE VISIT (OUTPATIENT)
Dept: FAMILY MEDICINE | Facility: CLINIC | Age: 40
End: 2020-02-05
Payer: COMMERCIAL

## 2020-02-05 VITALS
WEIGHT: 170 LBS | BODY MASS INDEX: 32.1 KG/M2 | HEIGHT: 61 IN | OXYGEN SATURATION: 97 % | HEART RATE: 63 BPM | TEMPERATURE: 98 F | RESPIRATION RATE: 12 BRPM | DIASTOLIC BLOOD PRESSURE: 66 MMHG | SYSTOLIC BLOOD PRESSURE: 120 MMHG

## 2020-02-05 DIAGNOSIS — Z13.6 CARDIOVASCULAR SCREENING; LDL GOAL LESS THAN 130: ICD-10-CM

## 2020-02-05 DIAGNOSIS — Z00.00 ROUTINE GENERAL MEDICAL EXAMINATION AT A HEALTH CARE FACILITY: Primary | ICD-10-CM

## 2020-02-05 DIAGNOSIS — Z83.49 FAMILY HISTORY OF THYROID DISEASE: ICD-10-CM

## 2020-02-05 DIAGNOSIS — Z12.31 VISIT FOR SCREENING MAMMOGRAM: ICD-10-CM

## 2020-02-05 DIAGNOSIS — F33.1 MAJOR DEPRESSIVE DISORDER, RECURRENT EPISODE, MODERATE (H): ICD-10-CM

## 2020-02-05 LAB
CHOLEST SERPL-MCNC: 137 MG/DL
HDLC SERPL-MCNC: 44 MG/DL
LDLC SERPL CALC-MCNC: 77 MG/DL
NONHDLC SERPL-MCNC: 93 MG/DL
TRIGL SERPL-MCNC: 80 MG/DL
TSH SERPL DL<=0.005 MIU/L-ACNC: 1.57 MU/L (ref 0.4–4)

## 2020-02-05 PROCEDURE — 80061 LIPID PANEL: CPT | Performed by: INTERNAL MEDICINE

## 2020-02-05 PROCEDURE — 99396 PREV VISIT EST AGE 40-64: CPT | Performed by: INTERNAL MEDICINE

## 2020-02-05 PROCEDURE — 84443 ASSAY THYROID STIM HORMONE: CPT | Performed by: INTERNAL MEDICINE

## 2020-02-05 PROCEDURE — 36415 COLL VENOUS BLD VENIPUNCTURE: CPT | Performed by: INTERNAL MEDICINE

## 2020-02-05 ASSESSMENT — MIFFLIN-ST. JEOR: SCORE: 1367.61

## 2020-02-05 NOTE — PROGRESS NOTES
SUBJECTIVE:   CC: Cecile Lim is an 39 year old woman who presents for preventive health visit.     Healthy Habits:    Do you get at least three servings of calcium containing foods daily (dairy, green leafy vegetables, etc.)? Not 3 a day    Amount of exercise or daily activities, outside of work: none    Problems taking medications regularly No    Medication side effects: No    Have you had an eye exam in the past two years? yes    Do you see a dentist twice per year? yes    Do you have sleep apnea, excessive snoring or daytime drowsiness?yes    Menopause questions: is still getting period monthly but it is irregular in timing.  Is is lighter than normal.  Is feeling more heat intolerance.  Mother went through menopause in her 40s.  She wonders about checking thyroid due to mother with history of thyroid condition.    Referral for mammogram;  No family history of breast cancer.  No personal history of mammograms.     Depression and Anxiety Follow-Up    How are you doing with your depression since your last visit? Improved     How are you doing with your anxiety since your last visit?  No change    Are you having other symptoms that might be associated with depression or anxiety? No    Have you had a significant life event? No     Do you have any concerns with your use of alcohol or other drugs? No    Social History     Tobacco Use     Smoking status: Never Smoker     Smokeless tobacco: Never Used   Substance Use Topics     Alcohol use: Yes     Alcohol/week: 0.0 standard drinks     Comment: Rare. Maybe one drink a month     Drug use: No     PHQ 10/5/2018 4/10/2019 12/16/2019   PHQ-9 Total Score 2 14 6   Q9: Thoughts of better off dead/self-harm past 2 weeks Not at all Not at all Not at all     LAURA-7 SCORE 1/5/2017 10/5/2018 4/10/2019   Total Score - - -   Total Score - - -   Total Score 5 6 14     Last PHQ-9 12/16/2019   1.  Little interest or pleasure in doing things 0   2.  Feeling down, depressed, or  hopeless 0   3.  Trouble falling or staying asleep, or sleeping too much 0   4.  Feeling tired or having little energy 0   5.  Poor appetite or overeating 1   6.  Feeling bad about yourself 0   7.  Trouble concentrating 3   8.  Moving slowly or restless 2   Q9: Thoughts of better off dead/self-harm past 2 weeks 0   PHQ-9 Total Score 6   Difficulty at work, home, or with people -     LAURA-7  4/10/2019   1. Feeling nervous, anxious, or on edge 2   2. Not being able to stop or control worrying 2   3. Worrying too much about different things 2   4. Trouble relaxing 2   5. Being so restless that it is hard to sit still 2   6. Becoming easily annoyed or irritable 2   7. Feeling afraid, as if something awful might happen 2   LAURA-7 Total Score 14   If you checked any problems, how difficult have they made it for you to do your work, take care of things at home, or get along with other people? Somewhat difficult         Suicide Assessment Five-step Evaluation and Treatment (SAFE-T)      Today's PHQ-2 Score: 0  PHQ-2 ( 1999 Pfizer) 1/5/2017 3/10/2016   Q1: Little interest or pleasure in doing things 0 0   Q2: Feeling down, depressed or hopeless 0 0   PHQ-2 Score 0 0       Abuse: Current or Past(Physical, Sexual or Emotional)- No  Do you feel safe in your environment? Yes        Social History     Tobacco Use     Smoking status: Never Smoker     Smokeless tobacco: Never Used   Substance Use Topics     Alcohol use: Yes     Alcohol/week: 0.0 standard drinks     Comment: Rare. Maybe one drink a month     If you drink alcohol do you typically have >3 drinks per day or >7 drinks per week? No                     Reviewed orders with patient.  Reviewed health maintenance and updated orders accordingly - Yes  Current Outpatient Medications   Medication Sig Dispense Refill     ibuprofen (ADVIL/MOTRIN) 600 MG tablet Take 1 tablet (600 mg) by mouth every 6 hours as needed for pain (mild) 30 tablet 0     lisinopril (PRINIVIL/ZESTRIL) 10 MG  tablet Take 1 tablet (10 mg) by mouth daily 90 tablet 3     methylphenidate (RITALIN) 10 MG tablet Take 1 tablet by mouth twice daily as needed. 60 tablet 0     methylphenidate (RITALIN) 10 MG tablet Take 1 tablet by mouth twice daily as needed. 60 tablet 0     methylphenidate (RITALIN) 10 MG tablet Take 1 tablet by mouth twice daily as needed. 60 tablet 0     venlafaxine (EFFEXOR-XR) 37.5 MG 24 hr capsule Take with 75 mg for total of 112.5 mg once daily 90 capsule 3     venlafaxine (EFFEXOR-XR) 75 MG 24 hr capsule Take 1 capsule (75 mg) by mouth daily 90 capsule 3       Mammogram Screening: Patient under age 50, mutual decision reflected in health maintenance.      Pertinent mammograms are reviewed under the imaging tab.  History of abnormal Pap smear: NO - age 30-65 PAP every 5 years with negative HPV co-testing recommended  PAP / HPV Latest Ref Rng & Units 1/5/2017 1/17/2013 6/1/2011   PAP - NIL NIL NIL   HPV 16 DNA NEG Negative - -   HPV 18 DNA NEG Negative - -   OTHER HR HPV NEG Negative - -     Reviewed and updated as needed this visit by clinical staff  Tobacco  Allergies  Meds  Med Hx  Surg Hx  Fam Hx  Soc Hx        Reviewed and updated as needed this visit by Provider            ROS: negative except as noted in HPI  CONSTITUTIONAL: NEGATIVE for fever, chills, change in weight  INTEGUMENTARU/SKIN: NEGATIVE for worrisome rashes, moles or lesions  EYES: NEGATIVE for vision changes or irritation  ENT: NEGATIVE for ear, mouth and throat problems  RESP: NEGATIVE for significant cough or SOB  BREAST: NEGATIVE for masses, tenderness or discharge  CV: NEGATIVE for chest pain, palpitations or peripheral edema  GI: NEGATIVE for nausea, abdominal pain, heartburn, or change in bowel habits  : NEGATIVE for unusual urinary or vaginal symptoms. Periods are regular.  MUSCULOSKELETAL: NEGATIVE for significant arthralgias or myalgia  NEURO: NEGATIVE for weakness, dizziness or paresthesias  PSYCHIATRIC: NEGATIVE for  "changes in mood or affect    OBJECTIVE:   /66   Pulse 63   Temp 98  F (36.7  C) (Tympanic)   Resp 12   Ht 1.54 m (5' 0.63\")   Wt 77.1 kg (170 lb)   SpO2 97%   Breastfeeding No   BMI 32.51 kg/m    EXAM:  GENERAL: healthy, alert and no distress  EYES: Eyes grossly normal to inspection, PERRL and conjunctivae and sclerae normal  HENT: ear canals and TM's normal, nose and mouth without ulcers or lesions  NECK: no adenopathy, no asymmetry, masses, or scars and thyroid normal to palpation  RESP: lungs clear to auscultation - no rales, rhonchi or wheezes  CV: regular rate and rhythm, normal S1 S2, no S3 or S4, no murmur, click or rub, no peripheral edema and peripheral pulses strong  ABDOMEN: soft, nontender, no hepatosplenomegaly, no masses and bowel sounds normal  MS: no gross musculoskeletal defects noted, no edema  SKIN: no suspicious lesions or rashes  NEURO: Normal strength and tone, mentation intact and speech normal  PSYCH: mentation appears normal, affect normal/bright      ASSESSMENT/PLAN:   1. Routine general medical examination at a health care facility    2. Major depressive disorder, recurrent episode, moderate (H) - stable, on meds    3. Visit for screening mammogram  - *MA Screening Digital Bilateral; Future    4. Family history of thyroid disease  - TSH with free T4 reflex    5. CARDIOVASCULAR SCREENING; LDL GOAL LESS THAN 130  - Lipid panel reflex to direct LDL Fasting    COUNSELING:   Reviewed preventive health counseling, as reflected in patient instructions    Estimated body mass index is 32.51 kg/m  as calculated from the following:    Height as of this encounter: 1.54 m (5' 0.63\").    Weight as of this encounter: 77.1 kg (170 lb).    Weight management plan: Discussed healthy diet and exercise guidelines     reports that she has never smoked. She has never used smokeless tobacco.      Counseling Resources:  ATP IV Guidelines  Pooled Cohorts Equation Calculator  Breast Cancer Risk " Calculator  FRAX Risk Assessment  ICSI Preventive Guidelines  Dietary Guidelines for Americans, 2010  USDA's MyPlate  ASA Prophylaxis  Lung CA Screening    Fawn Talley DO  [facility]

## 2020-02-05 NOTE — PATIENT INSTRUCTIONS
Preventive Health Recommendations  Female Ages 40 to 49    Yearly exam:     See your health care provider every year in order to  1. Review health changes.   2. Discuss preventive care.    3. Review your medicines if your doctor prescribed any.      Get a Pap test every three years (unless you have an abnormal result and your provider advises testing more often).      If you get Pap tests with HPV test, you only need to test every 5 years, unless you have an abnormal result. You do not need a Pap test if your uterus was removed (hysterectomy) and you have not had cancer.      You should be tested each year for STDs (sexually transmitted diseases), if you're at risk.     Ask your doctor if you should have a mammogram.      Have a colonoscopy (test for colon cancer) if someone in your family has had colon cancer or polyps before age 50.       Have a cholesterol test every 5 years.       Have a diabetes test (fasting glucose) after age 45. If you are at risk for diabetes, you should have this test every 3 years.    Shots: Get a flu shot each year. Get a tetanus shot every 10 years.     Nutrition:     Eat at least 5 servings of fruits and vegetables each day.    Eat whole-grain bread, whole-wheat pasta and brown rice instead of white grains and rice.    Get adequate Calcium and Vitamin D.      Lifestyle    Exercise at least 150 minutes a week (an average of 30 minutes a day, 5 days a week). This will help you control your weight and prevent disease.    Limit alcohol to one drink per day.    No smoking.     Wear sunscreen to prevent skin cancer.    See your dentist every six months for an exam and cleaning.      1. The Obesity Code by Dr. Gerald Pickett  2. Atomic Habits by Santi Sewell  3. Blood work today      Dr. Talley's Tips for a Healthy Diet    1. Add more fresh fruits and vegetables to your diet.  The more colorful with the fruit or vegetable (think berries, spinach, carrots, peppers) the healthier it tends to be.   Juice is not a fruit.  Prepare the vegetables in a healthy way - steam, bake.  Avoid batter/breading, butter/oil to cook.  2. Add more fiber to your diet.  Swap out white bread, white rice, white pasta for whole grain versions.  Reduce the portion size and frequency of carbohydrates/starches.  3. Choose healthier fats such as nuts, olive oil, avocado, etc. Stay away from lard, butter.  4. Decrease the frequency and portion size of 'junk food' -pizza, candy, cookies, potato chips, etc.  5. Watch liquid calories such as coffee drinks, juice, soda, teas.  There tends to be excessive sugar in these beverages.  6. Increase protein in your diet.  Eggs, cheese, yogurt, nuts, lentils, chicken, fish are good healthy choices.  Protein keeps you olvera longer, and you are less likely to have blood sugar spikes  7. Eat healthy at least 80% of the time.  It is ok for a special treat (Mom's spaghetti dinner, cake on your birthday) every once in a while, just not every day.  When are you going to indulge (think [employer] time), be sure you are eating extra healthy the day before and after.      Resources:    1. [employer] for Health and Wellness:https://www.[email or web address].edu/dig-yes-foods    2.   [employer] Ways To Wellness:    https://www.[employer].org/services/ways-to-wellness  Ways to Wellness offers:    Nutrition and weight management     Corrective exercise and fitness training     Lifestyle and behavioral change     Healing services  Our team includes registered dietitians, certified personal trainers, life coaches, as well as a Culinary Educator.    3. [facility] for Cardiovascular Disease Prevention  Consider making an appointment at the [facility] if either of the following describes you:    You are an adult who has risk factors for cardiovascular disease. Risk factors include cholesterol elevations, diabetes, high blood pressure, elevated weight, inactive living, and history of tobacco use.      You don t have traditional risk factors but have a strong family history of cardiovascular disease, which may mean you have a higher of risk of cardiovascular disease.

## 2020-03-17 ENCOUNTER — HOSPITAL ENCOUNTER (EMERGENCY)
Facility: CLINIC | Age: 40
End: 2020-03-17
Payer: COMMERCIAL

## 2020-03-17 ENCOUNTER — VIRTUAL VISIT (OUTPATIENT)
Dept: FAMILY MEDICINE | Facility: OTHER | Age: 40
End: 2020-03-17

## 2020-03-17 NOTE — PROGRESS NOTES
"Date: 2020 14:29:15  Clinician: Shannan Pfeiffer  Clinician NPI: [id number]  Patient: Cecile Lim  Patient : 1980  Patient Address: [address] [place], [place] [id number]  Patient Phone: [phone]  Visit Protocol: URI  Patient Summary:  Cecile is a 39 year old ( : 1980 ) female who initiated a Visit for cold, sinus infection, or influenza. When asked the question \"Please sign me up to receive news, health information and promotions from [employer].\", Cecile responded \"No\".    Cecile states her symptoms started 1-2 days ago.   Her symptoms consist of a sore throat, a cough, nasal congestion, chills, malaise, and rhinitis. Cecile also feels feverish.   Symptom details     Nasal secretions: The color of her mucus is clear.    Cough: Cecile coughs a few times an hour and her cough is not more bothersome at night. Phlegm comes into her throat when she coughs. She does not believe her cough is caused by post-nasal drip. The color of the phlegm is clear.     Sore throat: Cecile reports having mild throat pain (1-3 on a 10 point pain scale), does not have exudate on her tonsils, and can swallow liquids. The lymph nodes in her neck are not enlarged. A rash has not appeared on the skin since the sore throat started.     Temperature: Her current temperature is 100.9 degrees Fahrenheit. Cecile has had a temperature over 100 degrees Fahrenheit for 1-2 days.      Cecile denies having wheezing, teeth pain, ear pain, headache, enlarged lymph nodes, facial pain or pressure, and myalgias. She also denies taking antibiotic medication for the symptoms and having recent facial or sinus surgery in the past 60 days. She is not experiencing dyspnea.   Precipitating events  Cecile is not sure if she has been exposed to someone with strep throat. She has not recently been exposed to someone with influenza. Cecile has been in close contact with the following high risk individuals: immunocompromised people, adults 65 or older, and " people with asthma, heart disease or diabetes.   Pertinent COVID-19 (Coronavirus) information  Cecile has not traveled internationally or to the areas where COVID-19 (Coronavirus) is widespread in the last 14 days before the start of her symptoms.   Cecile has not had a close contact with a laboratory-confirmed COVID-19 patient within 14 days of symptom onset. She also has not had a close contact with a suspected COVID-19 patient within 14 days of symptom onset.   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   Pertinent medical history  Ceciel does not get yeast infections when she takes antibiotics.   Cecile needs a return to work/school note.   Weight: 170 lbs   Cecile does not smoke or use smokeless tobacco.   She denies pregnancy and denies breastfeeding. She has menstruated in the past month.   Weight: 170 lbs    MEDICATIONS: methylphenidate HCl oral, venlafaxine oral, lisinopril oral, ALLERGIES: NKDA  Clinician Response:  Dear Cecile,   Based on the information you have provided, you do have symptoms that are consistent with Coronavirus (COVID-19).  The coronavirus causes mild to severe respiratory illness with the most common symptoms including fever, cough and difficulty breathing. Unfortunately, many viruses cause similar symptoms and it can be difficult to distinguish between viruses, especially in mild cases, so we are presuming that anyone with cough or fever has coronavirus at this time.  Coronavirus/COVID-19 has reached the point of community spread in [place], meaning that we are finding the virus in people with no known exposure risk for ej the virus. Given the increasing commonness of coronavirus in the community we are no longer testing patients who are not critically ill.  For everyone else who has cough or fever, you should assume you are infected with coronavirus. Accordingly, you should self-quarantine for fourteen days from the first day your symptoms started. You  should call if you find increasing shortness of breath, wheezing or sustained fever above 101.5. If you are significantly short of breath or experience chest pain you should call 911 or report to the nearest emergency department for urgent evaluation.    Isolate yourself at home.   Do Not allow any visitors  Do Not go to work or school  Do Not go to [facility],  centers, shopping, or other public places.  Do Not shake hands.  Avoid close contact with others (hugging, kissing).   Protect Others:    Cover Your Mouth and Nose with a mask, disposable tissue or wash cloth to avoid spreading germs to others.  Wash your hands and face frequently with soap and water.   If you develop significant shortness of breath that prevents you from doing normal activities, please call 911 or proceed to the nearest emergency room and alert them immediately that you have been in self-isolation for possible coronavirus.   For more information about COVID19 and options for caring for yourself at home, please visit the CDC website at https://www.cdc.gov/coronavirus/2019-ncov/about/steps-when-sick.htmlFor more options for care at [facility], please visit our website at [email or web address]     Diagnosis: Acute upper respiratory infection, unspecified  Diagnosis ICD: J06.9

## 2020-03-27 ENCOUNTER — E-VISIT (OUTPATIENT)
Dept: FAMILY MEDICINE | Facility: CLINIC | Age: 40
End: 2020-03-27
Payer: COMMERCIAL

## 2020-03-27 DIAGNOSIS — J06.9 VIRAL URI: Primary | ICD-10-CM

## 2020-03-27 NOTE — PATIENT INSTRUCTIONS
Viral Upper Respiratory Illness (Adult)    You have a viral upper respiratory illness (URI), which is another term for the common cold. This illness is contagious during the first few days. It is spread through the air by coughing and sneezing. It may also be spread by direct contact (touching the sick person and then touching your own eyes, nose, or mouth). Frequent handwashing will decrease risk of spread. Most viral illnesses go away within 7 to 10 days with rest and simple home remedies. Sometimes the illness may last for several weeks. Antibiotics will not kill a virus, and they are generally not prescribed for this condition.  Home care    If symptoms are severe, rest at home for the first 2 to 3 days. When you resume activity, don't let yourself get too tired.    Don't smoke. If you need help stopping, talk with your healthcare provider.    Avoid being exposed to cigarette smoke (yours or others ).    You may use acetaminophen or ibuprofen to control pain and fever, unless another medicine was prescribed. If you have chronic liver or kidney disease, have ever had a stomach ulcer or gastrointestinal bleeding, or are taking blood-thinning medicines, talk with your healthcare provider before using these medicines. Aspirin should never be given to anyone under 18 years of age who is ill with a viral infection or fever. It may cause severe liver or brain damage.    Your appetite may be poor, so a light diet is fine. Stay well hydrated by drinking 6 to 8 glasses of fluids per day (water, soft drinks, juices, tea, or soup). Extra fluids will help loosen secretions in the nose and lungs.    Over-the-counter cold medicines will not shorten the length of time you re sick, but they may be helpful for the following symptoms: cough, sore throat, and nasal and sinus congestion. If you take prescription medicines, ask your healthcare provider or pharmacist which over-the-counter medicines are safe to use. (Note: Don't  use decongestants if you have high blood pressure.)  Follow-up care  Follow up with your healthcare provider, or as advised.  When to seek medical advice  Call your healthcare provider right away if any of these occur:    Cough with lots of colored sputum (mucus)    Severe headache; face, neck, or ear pain    Difficulty swallowing due to throat pain    Fever of 100.4 F (38 C) or higher, or as directed by your healthcare provider  Call 911  Call 911 if any of these occur:    Chest pain, shortness of breath, wheezing, or difficulty breathing    Coughing up blood    Very severe pain with swallowing, especially if it goes along with a muffled voice   Date Last Reviewed: 6/1/2018 2000-2019 [employer] [address] [place] [address], [place] [address]. All rights reserved. This information is not intended as a substitute for professional medical care. Always follow your healthcare professional's instructions.

## 2020-03-27 NOTE — TELEPHONE ENCOUNTER
Spoke with patient about letter for work    She reports she needs a 2nd letter to return to work sooner than initially recommend, because she was initally presumed to have COIVD, but now has negative COVID test.    Letter written to RTW 3/30    Provider E-Visit time total (minutes): 7

## 2020-03-27 NOTE — LETTER
March 27, 2020      Cecile Lim  [id number] [address] [place] [phone]        To Whom It May Concern:    Cecile Lim was seen in our clinic. She may return to work on 3/30/20 without restrictions or as other directed by employee/occupation health.        Sincerely,        Fawn Talley, DO

## 2020-04-02 ENCOUNTER — TELEPHONE (OUTPATIENT)
Dept: INTERNAL MEDICINE | Facility: CLINIC | Age: 40
End: 2020-04-02

## 2020-04-02 NOTE — TELEPHONE ENCOUNTER
Panel Management Review      Patient has the following on her problem list:     Depression / Dysthymia review    Measure:  Needs PHQ-9 score of 4 or less during index window.  Administer PHQ-9 and if score is 5 or more, send encounter to provider for next steps.    5 - 7 month window range: 2/10/20 to 6/9/20    PHQ-9 SCORE 10/5/2018 4/10/2019 12/16/2019   PHQ-9 Total Score - - -   PHQ-9 Total Score MyChart - - 6 (Mild depression)   PHQ-9 Total Score 2 14 6       If PHQ-9 recheck is 5 or more, route to provider for next steps.    Patient is due for:  PHQ9    Action needed:   Patient needs to do PHQ9.    Type of outreach:    Sent [employer] message.    Questions for provider review:    None                                                                                                                                    Britney Nice CMA (AAMA)   (aka: Carola Nice)       Chart routed to Care Team .

## 2020-04-02 NOTE — LETTER
May 6, 2020      Cecile Pakojez Lim  [address] [address] [place] [phone]        Dear Cecile,     We have been unable to reach you by phone Your [facility] Care Team works hard to make sure that you receive exceptional care. Enclosed you will find a copy of the phq-9/gad7 depression form  that our clinic uses to monitor and manage your Depression/anxiety  This test is an assessment tool that we can use to determine how well your Depression  is controlled. Please fill out and mail back the enclosed  form. Enclosed is a stamped addressed envelope for you to mail the form  back to the clinic in. Thank You             Sincerely,        Fawn Talley, DO

## 2020-04-07 NOTE — TELEPHONE ENCOUNTER
(2nd attempt) Left a  Voice msg for pt to check My-Chart and reply PHQ0 through there.  If pt calls back, transfer to the care team to perform PHQ9.  Britney Nice CMA (BREEZY)   (aka: Carola Nice)

## 2020-04-15 NOTE — TELEPHONE ENCOUNTER
(3rd attempt) Left a  Voice msg for pt to check My-Chart and reply PHQ0 through there.  If pt calls back, transfer to the care team to perform PHQ9.  Britney Nice CMA (BREEZY)   (aka: Carola Nice)

## 2020-05-06 NOTE — TELEPHONE ENCOUNTER
Panel Management Review      Patient has the following on her problem list:     Depression / Dysthymia review    Summary:    Patient is due/failing the following:   PHQ9      Type of outreach:    Sent letter. with copy of phq-9/gad7 for patient to fill out and mail back     Luis M GASPAR CMA

## 2020-05-18 ENCOUNTER — AMBULATORY - HEALTHEAST (OUTPATIENT)
Dept: SURGERY | Facility: CLINIC | Age: 40
End: 2020-05-18

## 2020-05-18 ENCOUNTER — MYC MEDICAL ADVICE (OUTPATIENT)
Dept: FAMILY MEDICINE | Facility: CLINIC | Age: 40
End: 2020-05-18

## 2020-05-18 ENCOUNTER — E-VISIT (OUTPATIENT)
Dept: FAMILY MEDICINE | Facility: CLINIC | Age: 40
End: 2020-05-18
Payer: COMMERCIAL

## 2020-05-18 ENCOUNTER — NURSE TRIAGE (OUTPATIENT)
Dept: NURSING | Facility: CLINIC | Age: 40
End: 2020-05-18

## 2020-05-18 DIAGNOSIS — Z20.822 SUSPECTED COVID-19 VIRUS INFECTION: ICD-10-CM

## 2020-05-18 DIAGNOSIS — Z20.822 SUSPECTED COVID-19 VIRUS INFECTION: Primary | ICD-10-CM

## 2020-05-18 PROCEDURE — 99207 ZZC NO BILLABLE SERVICE THIS VISIT: CPT | Performed by: INTERNAL MEDICINE

## 2020-05-18 NOTE — TELEPHONE ENCOUNTER
RN triage call from patient  She reports having put in a request for an eVisit with her primary.  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    She reports symptoms of   Headache for 3 days, congestion in the mornings, sore throat some nausea and some sweating with exertion. Has been using tylenol so unable to say if she has a fever.  Did offer telephone visit but patient would like message sent to her primary to advise the quickest way to get tested as she would like to resume working as soon as she can.  Please call patient and let her know does she need a virual visit, eVisit etc. Or is provider willing to put in an order for the test?  Routing to [facility] for assistance with call  Sarahy Dias RN  [facility] Nurse Advisor      [facility] Specific Disposition  - REQUIRED: [facility] Specific Patient Instructions  COVID 19 Nurse Triage Plan/Patient Instructions    Please be aware that novel coronavirus (COVID-19) may be circulating in the community. If you develop symptoms such as fever, cough, or SOB or if you have concerns about the presence of another infection including coronavirus (COVID-19), please contact your health care provider or visit [email or web address].       Patient to have scheduled Telephone Visit with a provider. Follow System Ambulatory Workflow for COVID 19.     The clinic staff will assist you to schedule an appointment to complete the Telephone Visit with a provider during normal clinic hours.       Call Back If: Your symptoms worsen before you are able to complete your Telephone Visit with a provider.    Thank you for limiting contact with others, wearing a simple mask to cover your cough, practice good hand hygiene habits and accessing our virtual services  where possible to limit the spread of this virus.    For more information about COVID19 and options for caring for yourself at home, please visit the CDC website at https://www.cdc.gov/coronavirus/2019-ncov/about/steps-when-sick.html  For more options for care at [facility], please visit our website at [email or web address]    For more information, please use the [place] Department of Health ([facility]) COVID-19 Hotlines (Interpreters available):   Health questions: Phone Number: [phone] or [phone] and Hours: 7 a.m. to 7 p.m.  Schools and  questions: Phone Number: [phone] or [phone] and Hours 7 a.m. to 7 p.m.    Protocols used: CORONAVIRUS (COVID-19) EXPOSURE-A- 4.22.20

## 2020-05-18 NOTE — TELEPHONE ENCOUNTER
Left message that this has been ordered and how it works.  Recommended that she call with any questions.  Order placed for PCR testing.  Brandy Arrieta NP on 5/18/2020 at 1:59 PM

## 2020-05-19 ENCOUNTER — OFFICE VISIT - HEALTHEAST (OUTPATIENT)
Dept: FAMILY MEDICINE | Facility: CLINIC | Age: 40
End: 2020-05-19

## 2020-05-19 DIAGNOSIS — Z20.822 SUSPECTED COVID-19 VIRUS INFECTION: ICD-10-CM

## 2020-05-21 ENCOUNTER — COMMUNICATION - HEALTHEAST (OUTPATIENT)
Dept: FAMILY MEDICINE | Facility: CLINIC | Age: 40
End: 2020-05-21

## 2020-05-22 DIAGNOSIS — I10 ESSENTIAL HYPERTENSION: ICD-10-CM

## 2020-05-22 DIAGNOSIS — F41.1 GENERALIZED ANXIETY DISORDER: Chronic | ICD-10-CM

## 2020-05-22 DIAGNOSIS — F33.1 MAJOR DEPRESSIVE DISORDER, RECURRENT EPISODE, MODERATE (H): Chronic | ICD-10-CM

## 2020-05-22 NOTE — TELEPHONE ENCOUNTER
Routing refill request to provider for review/approval because:  Labs not current:    Creatinine   Date Value Ref Range Status   04/10/2019 0.55 0.52 - 1.04 mg/dL Final     Potassium   Date Value Ref Range Status   04/10/2019 3.7 3.4 - 5.3 mmol/L Final     Due for clinic visit for depression management per protocol.     Last office visit: 2/5/2020   Future Office Visit:  None.     NATHALIE HernandezN, RN

## 2020-05-25 RX ORDER — VENLAFAXINE HYDROCHLORIDE 75 MG/1
75 CAPSULE, EXTENDED RELEASE ORAL DAILY
Qty: 30 CAPSULE | Refills: 0 | Status: SHIPPED | OUTPATIENT
Start: 2020-05-25 | End: 2020-06-26

## 2020-05-25 RX ORDER — VENLAFAXINE HYDROCHLORIDE 37.5 MG/1
CAPSULE, EXTENDED RELEASE ORAL
Qty: 30 CAPSULE | Refills: 0 | Status: SHIPPED | OUTPATIENT
Start: 2020-05-25 | End: 2020-06-26

## 2020-05-25 RX ORDER — LISINOPRIL 10 MG/1
10 TABLET ORAL DAILY
Qty: 30 TABLET | Refills: 0 | Status: SHIPPED | OUTPATIENT
Start: 2020-05-25 | End: 2020-06-26

## 2020-06-01 ENCOUNTER — E-VISIT (OUTPATIENT)
Dept: FAMILY MEDICINE | Facility: CLINIC | Age: 40
End: 2020-06-01
Payer: COMMERCIAL

## 2020-06-01 DIAGNOSIS — F43.23 ADJUSTMENT DISORDER WITH MIXED ANXIETY AND DEPRESSED MOOD: Primary | ICD-10-CM

## 2020-06-01 PROCEDURE — 99421 OL DIG E/M SVC 5-10 MIN: CPT | Performed by: INTERNAL MEDICINE

## 2020-06-01 ASSESSMENT — ANXIETY QUESTIONNAIRES
6. BECOMING EASILY ANNOYED OR IRRITABLE: SEVERAL DAYS
3. WORRYING TOO MUCH ABOUT DIFFERENT THINGS: MORE THAN HALF THE DAYS
GAD7 TOTAL SCORE: 12
7. FEELING AFRAID AS IF SOMETHING AWFUL MIGHT HAPPEN: MORE THAN HALF THE DAYS
GAD7 TOTAL SCORE: 12
GAD7 TOTAL SCORE: 12
2. NOT BEING ABLE TO STOP OR CONTROL WORRYING: SEVERAL DAYS
4. TROUBLE RELAXING: MORE THAN HALF THE DAYS
7. FEELING AFRAID AS IF SOMETHING AWFUL MIGHT HAPPEN: MORE THAN HALF THE DAYS
5. BEING SO RESTLESS THAT IT IS HARD TO SIT STILL: MORE THAN HALF THE DAYS
1. FEELING NERVOUS, ANXIOUS, OR ON EDGE: MORE THAN HALF THE DAYS

## 2020-06-01 ASSESSMENT — PATIENT HEALTH QUESTIONNAIRE - PHQ9
SUM OF ALL RESPONSES TO PHQ QUESTIONS 1-9: 6
SUM OF ALL RESPONSES TO PHQ QUESTIONS 1-9: 6
10. IF YOU CHECKED OFF ANY PROBLEMS, HOW DIFFICULT HAVE THESE PROBLEMS MADE IT FOR YOU TO DO YOUR WORK, TAKE CARE OF THINGS AT HOME, OR GET ALONG WITH OTHER PEOPLE: SOMEWHAT DIFFICULT

## 2020-06-02 DIAGNOSIS — G47.11 IDIOPATHIC HYPERSOMNIA WITH LONG SLEEP TIME: ICD-10-CM

## 2020-06-02 RX ORDER — LORAZEPAM 0.5 MG/1
0.5 TABLET ORAL 2 TIMES DAILY PRN
Qty: 10 TABLET | Refills: 0 | Status: SHIPPED | OUTPATIENT
Start: 2020-06-02 | End: 2020-07-16

## 2020-06-02 ASSESSMENT — PATIENT HEALTH QUESTIONNAIRE - PHQ9: SUM OF ALL RESPONSES TO PHQ QUESTIONS 1-9: 6

## 2020-06-02 ASSESSMENT — ANXIETY QUESTIONNAIRES: GAD7 TOTAL SCORE: 12

## 2020-06-02 NOTE — TELEPHONE ENCOUNTER
Pending RX for methylphenidate 10 mg tablet, DISP:  60 tablets, SIG:  Take one half(0.5)  to 1 tablet twice daily  REFILL^0    Last Written Prescription Date:  11/20/2019  Last Fill Quantity: 60   # refills: 0  Last Office Visit: Sept 2019  Future Office visit: RTC one year    Routed to Dr. Beach for review.      Routing refill request to provider for review/approval because:  Drug not on the [facility] or [facility] refill protocol or controlled substance

## 2020-06-02 NOTE — PATIENT INSTRUCTIONS
Thank you for choosing us for your care. I have placed an order for a prescription so that you can start treatment. View your full visit summary for details by clicking on the link below. Your pharmacist will able to address any questions you may have about the medication.      If you're not feeling better within 4-6 weeks please schedule an appointment.  You can schedule an appointment right here in [facility], or call [phone]  If the visit is for the same symptoms as your e-visit, we'll refund the cost of your e-visit if seen within seven days.    Cecile--    I will refill the lorazepam.  Lets follow-up with a video or telephone visit in 1-2 weeks if anxiety is still not well controlled

## 2020-06-03 RX ORDER — METHYLPHENIDATE HYDROCHLORIDE 10 MG/1
TABLET ORAL
Qty: 60 TABLET | Refills: 0 | Status: SHIPPED | OUTPATIENT
Start: 2020-08-03 | End: 2022-01-25

## 2020-06-03 RX ORDER — METHYLPHENIDATE HYDROCHLORIDE 10 MG/1
TABLET ORAL
Qty: 60 TABLET | Refills: 0 | Status: SHIPPED | OUTPATIENT
Start: 2020-07-03 | End: 2022-01-25

## 2020-06-03 RX ORDER — METHYLPHENIDATE HYDROCHLORIDE 10 MG/1
TABLET ORAL
Qty: 60 TABLET | Refills: 0 | Status: SHIPPED | OUTPATIENT
Start: 2020-06-03 | End: 2021-01-14

## 2020-06-26 DIAGNOSIS — F33.1 MAJOR DEPRESSIVE DISORDER, RECURRENT EPISODE, MODERATE (H): Chronic | ICD-10-CM

## 2020-06-26 DIAGNOSIS — I10 ESSENTIAL HYPERTENSION: ICD-10-CM

## 2020-06-26 DIAGNOSIS — F41.1 GENERALIZED ANXIETY DISORDER: Chronic | ICD-10-CM

## 2020-06-26 RX ORDER — LISINOPRIL 10 MG/1
10 TABLET ORAL DAILY
Qty: 30 TABLET | Refills: 0 | Status: SHIPPED | OUTPATIENT
Start: 2020-06-26 | End: 2020-07-16

## 2020-06-26 RX ORDER — VENLAFAXINE HYDROCHLORIDE 75 MG/1
75 CAPSULE, EXTENDED RELEASE ORAL DAILY
Qty: 30 CAPSULE | Refills: 0 | Status: SHIPPED | OUTPATIENT
Start: 2020-06-26 | End: 2020-07-16

## 2020-06-26 RX ORDER — VENLAFAXINE HYDROCHLORIDE 37.5 MG/1
CAPSULE, EXTENDED RELEASE ORAL
Qty: 30 CAPSULE | Refills: 0 | Status: SHIPPED | OUTPATIENT
Start: 2020-06-26 | End: 2020-07-16

## 2020-06-26 NOTE — TELEPHONE ENCOUNTER
Last kathia given.  Recommend non-fasting labs and virtual follow-up for mental health.  Recent E-visit for anxiety

## 2020-06-26 NOTE — TELEPHONE ENCOUNTER
"Routing refill request to provider for review/approval because:  Labs out of range:  PHQ-9 above 4  Labs not current:  Cr    PHQ 4/10/2019 12/16/2019 6/1/2020   PHQ-9 Total Score 14 6 6   Q9: Thoughts of better off dead/self-harm past 2 weeks Not at all Not at all Not at all       Requested Prescriptions   Pending Prescriptions Disp Refills     venlafaxine (EFFEXOR-XR) 75 MG 24 hr capsule [Pharmacy Med Name: venlafaxine ER 75 mg capsule,extended release 24 hr] 30 capsule 0     Sig: Take 1 capsule (75 mg) by mouth daily       Serotonin-Norepinephrine Reuptake Inhibitors  Failed - 6/26/2020  1:54 PM        Failed - PHQ-9 score of less than 5 in past 6 months     Please review last PHQ-9 score.           Failed - Normal serum creatinine on file in past 12 months     Recent Labs   Lab Test 04/10/19  1315   CR 0.55       Ok to refill medication if creatinine is low          Passed - Blood pressure under 140/90 in past 12 months     BP Readings from Last 3 Encounters:   02/05/20 120/66   01/15/20 124/82   09/20/19 126/85                 Passed - Medication is active on med list        Passed - Patient is age 18 or older        Passed - No active pregnancy on record        Passed - No positive pregnancy test in past 12 months        Passed - Recent (6 mo) or future (30 days) visit within the authorizing provider's specialty     Patient had office visit in the last 6 months or has a visit in the next 30 days with authorizing provider or within the authorizing provider's specialty.  See \"Patient Info\" tab in inbasket, or \"Choose Columns\" in Meds & Orders section of the refill encounter.               lisinopril (ZESTRIL) 10 MG tablet [Pharmacy Med Name: lisinopril 10 mg tablet] 30 tablet 0     Sig: Take 1 tablet (10 mg) by mouth daily       ACE Inhibitors (Including Combos) Protocol Failed - 6/26/2020  1:54 PM        Failed - Normal serum creatinine on file in past 12 months     Recent Labs   Lab Test 04/10/19  1315   CR " "0.55       Ok to refill medication if creatinine is low          Failed - Normal serum potassium on file in past 12 months     Recent Labs   Lab Test 04/10/19  1315   POTASSIUM 3.7             Passed - Blood pressure under 140/90 in past 12 months     BP Readings from Last 3 Encounters:   02/05/20 120/66   01/15/20 124/82   09/20/19 126/85                 Passed - Recent (12 mo) or future (30 days) visit within the authorizing provider's specialty     Patient has had an office visit with the authorizing provider or a provider within the authorizing providers department within the previous 12 mos or has a future within next 30 days. See \"Patient Info\" tab in inbasket, or \"Choose Columns\" in Meds & Orders section of the refill encounter.              Passed - Medication is active on med list        Passed - Patient is age 18 or older        Passed - No active pregnancy on record        Passed - No positive pregnancy test within past 12 months           venlafaxine (EFFEXOR-XR) 37.5 MG 24 hr capsule [Pharmacy Med Name: venlafaxine ER 37.5 mg capsule,extended release 24 hr] 30 capsule 0     Sig: TAKE 1 CAPSULE BY MOUTH EVERY DAY       Serotonin-Norepinephrine Reuptake Inhibitors  Failed - 6/26/2020  1:54 PM        Failed - PHQ-9 score of less than 5 in past 6 months     Please review last PHQ-9 score.           Failed - Normal serum creatinine on file in past 12 months     Recent Labs   Lab Test 04/10/19  1315   CR 0.55       Ok to refill medication if creatinine is low          Passed - Blood pressure under 140/90 in past 12 months     BP Readings from Last 3 Encounters:   02/05/20 120/66   01/15/20 124/82   09/20/19 126/85                 Passed - Medication is active on med list        Passed - Patient is age 18 or older        Passed - No active pregnancy on record        Passed - No positive pregnancy test in past 12 months        Passed - Recent (6 mo) or future (30 days) visit within the authorizing provider's " "specialty     Patient had office visit in the last 6 months or has a visit in the next 30 days with authorizing provider or within the authorizing provider's specialty.  See \"Patient Info\" tab in inbasket, or \"Choose Columns\" in Meds & Orders section of the refill encounter.                 "

## 2020-06-30 ENCOUNTER — VIRTUAL VISIT (OUTPATIENT)
Dept: FAMILY MEDICINE | Facility: CLINIC | Age: 40
End: 2020-06-30
Payer: COMMERCIAL

## 2020-06-30 DIAGNOSIS — I10 ESSENTIAL HYPERTENSION: ICD-10-CM

## 2020-06-30 DIAGNOSIS — F41.1 GENERALIZED ANXIETY DISORDER: Chronic | ICD-10-CM

## 2020-06-30 DIAGNOSIS — F33.1 MAJOR DEPRESSIVE DISORDER, RECURRENT EPISODE, MODERATE (H): Chronic | ICD-10-CM

## 2020-06-30 DIAGNOSIS — F43.23 ADJUSTMENT DISORDER WITH MIXED ANXIETY AND DEPRESSED MOOD: ICD-10-CM

## 2020-06-30 PROCEDURE — 99207 ZZC NO BILLABLE SERVICE THIS VISIT: CPT | Performed by: INTERNAL MEDICINE

## 2020-06-30 RX ORDER — LISINOPRIL 10 MG/1
10 TABLET ORAL DAILY
Qty: 30 TABLET | Refills: 0 | Status: CANCELLED | OUTPATIENT
Start: 2020-06-30

## 2020-06-30 RX ORDER — VENLAFAXINE HYDROCHLORIDE 75 MG/1
75 CAPSULE, EXTENDED RELEASE ORAL DAILY
Qty: 30 CAPSULE | Refills: 0 | Status: CANCELLED | OUTPATIENT
Start: 2020-06-30

## 2020-06-30 RX ORDER — VENLAFAXINE HYDROCHLORIDE 37.5 MG/1
CAPSULE, EXTENDED RELEASE ORAL
Qty: 30 CAPSULE | Refills: 0 | Status: CANCELLED | OUTPATIENT
Start: 2020-06-30

## 2020-06-30 RX ORDER — LORAZEPAM 0.5 MG/1
0.5 TABLET ORAL 2 TIMES DAILY PRN
Qty: 10 TABLET | Refills: 0 | Status: CANCELLED | OUTPATIENT
Start: 2020-06-30

## 2020-06-30 NOTE — PROGRESS NOTES
"Cecile Lim is a 39 year old female who is being evaluated via a billable telephone visit.      The patient has been notified of following:     \"This telephone visit will be conducted via a call between you and your physician/provider. We have found that certain health care needs can be provided without the need for a physical exam.  This service lets us provide the care you need with a short phone conversation.  If a prescription is necessary we can send it directly to your pharmacy.  If lab work is needed we can place an order for that and you can then stop by our lab to have the test done at a later time.    Telephone visits are billed at different rates depending on your insurance coverage. During this emergency period, for some insurers they may be billed the same as an in-person visit.  Please reach out to your insurance provider with any questions.    If during the course of the call the physician/provider feels a telephone visit is not appropriate, you will not be charged for this service.\"    Patient has given verbal consent for Telephone visit?  Yes    What phone number would you like to be contacted at? [phone]    How would you like to obtain your AVS? Ravihart    Subjective     Cecile Lim is a 39 year old female who presents via phone visit today for the following health issues:    Patient called 3x between 4:02 and 4:18, 3 VM left.  Patient was NO SHOW to visit    HPI  Hypertension Follow-up      Do you check your blood pressure regularly outside of the clinic? No     Are you following a low salt diet? Yes    Are your blood pressures ever more than 140 on the top number (systolic) OR more   than 90 on the bottom number (diastolic), for example 140/90? No    Depression and Anxiety Follow-Up    How are you doing with your depression since your last visit? Worsened     How are you doing with your anxiety since your last visit?  Worsened     Are you having other symptoms that might be " associated with depression or anxiety? No    Have you had a significant life event? OTHER:  Job is a  and work related     Do you have any concerns with your use of alcohol or other drugs? No    Social History     Tobacco Use     Smoking status: Never Smoker     Smokeless tobacco: Never Used   Substance Use Topics     Alcohol use: Yes     Alcohol/week: 0.0 standard drinks     Comment: Rare. Maybe one drink a month     Drug use: No     PHQ 12/16/2019 6/1/2020 6/30/2020   PHQ-9 Total Score 6 6 10   Q9: Thoughts of better off dead/self-harm past 2 weeks Not at all Not at all Not at all     LAURA-7 SCORE 4/10/2019 6/1/2020 6/30/2020   Total Score - - -   Total Score - 12 (moderate anxiety) 17 (severe anxiety)   Total Score 14 12 17     Last PHQ-9 6/30/2020   1.  Little interest or pleasure in doing things 0   2.  Feeling down, depressed, or hopeless 1   3.  Trouble falling or staying asleep, or sleeping too much 1   4.  Feeling tired or having little energy 1   5.  Poor appetite or overeating 2   6.  Feeling bad about yourself 0   7.  Trouble concentrating 2   8.  Moving slowly or restless 3   Q9: Thoughts of better off dead/self-harm past 2 weeks 0   PHQ-9 Total Score 10   Difficulty at work, home, or with people -     LAURA-7  6/30/2020   1. Feeling nervous, anxious, or on edge 3   2. Not being able to stop or control worrying 2   3. Worrying too much about different things 2   4. Trouble relaxing 3   5. Being so restless that it is hard to sit still 3   6. Becoming easily annoyed or irritable 2   7. Feeling afraid, as if something awful might happen 2   LAURA-7 Total Score 17   If you checked any problems, how difficult have they made it for you to do your work, take care of things at home, or get along with other people? -       Suicide Assessment Five-step Evaluation and Treatment (SAFE-T)      Current Outpatient Medications   Medication Sig Dispense Refill     lisinopril (ZESTRIL) 10 MG tablet Take  1 tablet (10 mg) by mouth daily 30 tablet 0     LORazepam (ATIVAN) 0.5 MG tablet Take 1 tablet (0.5 mg) by mouth 2 times daily as needed for anxiety 10 tablet 0     methylphenidate (RITALIN) 10 MG tablet Take 1 tablet by mouth twice daily as needed. 60 tablet 0     [START ON 7/3/2020] methylphenidate (RITALIN) 10 MG tablet Take 1 tablet by mouth twice daily as needed. 60 tablet 0     [START ON 8/3/2020] methylphenidate (RITALIN) 10 MG tablet Take 1 tablet by mouth twice daily as needed. 60 tablet 0     methylphenidate (RITALIN) 10 MG tablet Take 1 tablet by mouth twice daily as needed. 60 tablet 0     methylphenidate (RITALIN) 10 MG tablet Take 1 tablet by mouth twice daily as needed. 60 tablet 0     venlafaxine (EFFEXOR-XR) 37.5 MG 24 hr capsule TAKE 1 CAPSULE BY MOUTH EVERY DAY 30 capsule 0     venlafaxine (EFFEXOR-XR) 75 MG 24 hr capsule Take 1 capsule (75 mg) by mouth daily 30 capsule 0     ibuprofen (ADVIL/MOTRIN) 600 MG tablet Take 1 tablet (600 mg) by mouth every 6 hours as needed for pain (mild) 30 tablet 0       Reviewed and updated as needed this visit by Provider         Review of Systems   Constitutional, HEENT, cardiovascular, pulmonary, gi and gu systems are negative, except as otherwise noted.           Patient was a NO SHOW to visit.
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
## 2020-07-16 ENCOUNTER — VIRTUAL VISIT (OUTPATIENT)
Dept: FAMILY MEDICINE | Facility: CLINIC | Age: 40
End: 2020-07-16
Payer: COMMERCIAL

## 2020-07-16 DIAGNOSIS — F33.1 MAJOR DEPRESSIVE DISORDER, RECURRENT EPISODE, MODERATE (H): Chronic | ICD-10-CM

## 2020-07-16 DIAGNOSIS — F41.1 GENERALIZED ANXIETY DISORDER: Chronic | ICD-10-CM

## 2020-07-16 DIAGNOSIS — I10 ESSENTIAL HYPERTENSION: ICD-10-CM

## 2020-07-16 LAB
ANION GAP SERPL CALCULATED.3IONS-SCNC: 4 MMOL/L (ref 3–14)
BUN SERPL-MCNC: 12 MG/DL (ref 7–30)
CALCIUM SERPL-MCNC: 8.9 MG/DL (ref 8.5–10.1)
CHLORIDE SERPL-SCNC: 106 MMOL/L (ref 94–109)
CO2 SERPL-SCNC: 27 MMOL/L (ref 20–32)
CREAT SERPL-MCNC: 0.59 MG/DL (ref 0.52–1.04)
GFR SERPL CREATININE-BSD FRML MDRD: >90 ML/MIN/{1.73_M2}
GLUCOSE SERPL-MCNC: 80 MG/DL (ref 70–99)
POTASSIUM SERPL-SCNC: 4 MMOL/L (ref 3.4–5.3)
SODIUM SERPL-SCNC: 137 MMOL/L (ref 133–144)

## 2020-07-16 PROCEDURE — 36415 COLL VENOUS BLD VENIPUNCTURE: CPT | Performed by: INTERNAL MEDICINE

## 2020-07-16 PROCEDURE — 99214 OFFICE O/P EST MOD 30 MIN: CPT | Mod: TEL | Performed by: INTERNAL MEDICINE

## 2020-07-16 PROCEDURE — 80048 BASIC METABOLIC PNL TOTAL CA: CPT | Performed by: INTERNAL MEDICINE

## 2020-07-16 RX ORDER — VENLAFAXINE HYDROCHLORIDE 75 MG/1
CAPSULE, EXTENDED RELEASE ORAL
Qty: 90 CAPSULE | Refills: 3 | Status: SHIPPED | OUTPATIENT
Start: 2020-07-16 | End: 2021-07-13

## 2020-07-16 RX ORDER — LISINOPRIL 10 MG/1
10 TABLET ORAL DAILY
Qty: 90 TABLET | Refills: 3 | Status: SHIPPED | OUTPATIENT
Start: 2020-07-16 | End: 2021-07-13

## 2020-07-16 RX ORDER — VENLAFAXINE HYDROCHLORIDE 37.5 MG/1
CAPSULE, EXTENDED RELEASE ORAL
Qty: 90 CAPSULE | Refills: 3 | Status: SHIPPED | OUTPATIENT
Start: 2020-07-16 | End: 2021-07-13

## 2020-07-16 NOTE — PATIENT INSTRUCTIONS
1.  Refilled lisinopril.  You are due for nonfasting blood work to check kidney function and electrolytes  2.  Refilled the venlafaxine.  Keep working with a counselor  3.  For the weight, started Qsymia.  If too expensive or not covered under insurance, let me know and we will send a separate prescriptions.  4.  Referral to nutrition  5.  Follow-up in 3 months.  The goal is at least 3% body weight reduction after 3 months  6.  Keep an eye on your blood pressure on the medication.  The phentermine can sometimes increase blood pressure      Dr. Talley's Tips for a Healthy Diet    1. Add more fresh fruits and vegetables to your diet.  The more colorful with the fruit or vegetable (think berries, spinach, carrots, peppers) the healthier it tends to be.  Juice is not a fruit.  Prepare the vegetables in a healthy way - steam, bake.  Avoid batter/breading, butter/oil to cook.  2. Add more fiber to your diet.  Swap out white bread, white rice, white pasta for whole grain versions.  Reduce the portion size and frequency of carbohydrates/starches.  3. Choose healthier fats such as nuts, olive oil, avocado, etc. Stay away from lard, butter.  4. Decrease the frequency and portion size of 'junk food' -pizza, candy, cookies, potato chips, etc.  5. Watch liquid calories such as coffee drinks, juice, soda, teas.  There tends to be excessive sugar in these beverages.  6. Increase protein in your diet.  Eggs, cheese, yogurt, nuts, lentils, chicken, fish are good healthy choices.  Protein keeps you olvera longer, and you are less likely to have blood sugar spikes  7. Eat healthy at least 80% of the time.  It is ok for a special treat (Mom's spaghetti dinner, cake on your birthday) every once in a while, just not every day.  When are you going to indulge (think [employer] time), be sure you are eating extra healthy the day before and after.      Resources:    1. [place] [facility] for Health and  Wellness:[email or web address]    2.   [place] [facility] Well[facility]:    https://www.[facility].org/services/ways-to-wellness  Ways [facility] Wellness offers:    Nutrition and weight management     Corrective exercise and fitness training     Lifestyle and behavioral change     Healing services  Our team includes registered dietitians, certified personal trainers, life coaches, as well as a Culinary Educator.    3. [facility] for Cardiovascular Disease Prevention  Consider making an appointment at the [facility] if either of the following describes you:    You are an adult who has risk factors for cardiovascular disease. Risk factors include cholesterol elevations, diabetes, high blood pressure, elevated weight, inactive living, and history of tobacco use.     You don t have traditional risk factors but have a strong family history of cardiovascular disease, which may mean you have a higher of risk of cardiovascular disease.

## 2020-07-17 RX ORDER — PHENTERMINE AND TOPIRAMATE 7.5; 46 MG/1; MG/1
CAPSULE, EXTENDED RELEASE ORAL
Qty: 90 CAPSULE | Refills: 0 | OUTPATIENT
Start: 2020-07-17

## 2020-07-17 NOTE — TELEPHONE ENCOUNTER
"Requested Prescriptions   Pending Prescriptions Disp Refills     QSYMIA 7.5-46 MG CP24 [Pharmacy Med Name: Qsymia 7.5 mg-46 mg capsule, extended release] 90 capsule 0     Sig: Take 1 capsule by mouth daily       Anti-Seizure Meds Protocol  Failed - 7/16/2020 10:28 AM        Failed - Review Authorizing provider's last note.      Refer to last progress notes: confirm request is for original authorizing provider (cannot be through other providers).          Failed - Normal CBC on file in past 26 months     Recent Labs   Lab Test 02/23/18  1631   WBC 8.4   RBC 4.50   HGB 14.1   HCT 41.1                    Failed - Normal ALT or AST on file in past 26 months     Recent Labs   Lab Test 02/23/18  1631   ALT 22     Recent Labs   Lab Test 02/23/18  1631   AST 11             Failed - Normal platelet count on file in past 26 months     Recent Labs   Lab Test 02/23/18  1631                  Passed - Recent (12 mo) or future (30 days) visit within the authorizing provider's specialty     Patient has had an office visit with the authorizing provider or a provider within the authorizing providers department within the previous 12 mos or has a future within next 30 days. See \"Patient Info\" tab in inbasket, or \"Choose Columns\" in Meds & Orders section of the refill encounter.              Passed - Medication is active on med list        Passed - No active pregnancy on record        Passed - No positive pregnancy test in last 12 months             "

## 2020-08-06 ENCOUNTER — TELEPHONE (OUTPATIENT)
Dept: INTERNAL MEDICINE | Facility: CLINIC | Age: 40
End: 2020-08-06

## 2020-08-06 ENCOUNTER — HOSPITAL ENCOUNTER (OUTPATIENT)
Dept: MAMMOGRAPHY | Facility: CLINIC | Age: 40
Discharge: HOME | End: 2020-08-06
Attending: INTERNAL MEDICINE | Admitting: INTERNAL MEDICINE
Payer: COMMERCIAL

## 2020-08-06 DIAGNOSIS — Z12.31 VISIT FOR SCREENING MAMMOGRAM: ICD-10-CM

## 2020-08-06 DIAGNOSIS — Z12.31 VISIT FOR SCREENING MAMMOGRAM: Primary | ICD-10-CM

## 2020-08-06 PROCEDURE — 77067 SCR MAMMO BI INCL CAD: CPT

## 2020-08-11 DIAGNOSIS — R92.8 ABNORMAL SCREENING MAMMOGRAM: Primary | ICD-10-CM

## 2020-08-21 ENCOUNTER — HOSPITAL ENCOUNTER (OUTPATIENT)
Dept: MAMMOGRAPHY | Facility: CLINIC | Age: 40
Discharge: HOME | End: 2020-08-21
Attending: INTERNAL MEDICINE | Admitting: INTERNAL MEDICINE
Payer: COMMERCIAL

## 2020-08-21 DIAGNOSIS — R92.8 ABNORMAL MAMMOGRAM: ICD-10-CM

## 2020-08-21 PROCEDURE — 77066 DX MAMMO INCL CAD BI: CPT

## 2020-10-13 NOTE — PROGRESS NOTES
"Cecile Lim is a 39 year old female who is being evaluated via a billable video visit.      The patient has been notified of following:     \"This video visit will be conducted via a call between you and your physician/provider. We have found that certain health care needs can be provided without the need for an in-person physical exam.  This service lets us provide the care you need with a video conversation.  If a prescription is necessary we can send it directly to your pharmacy.  If lab work is needed we can place an order for that and you can then stop by our lab to have the test done at a later time.    Video visits are billed at different rates depending on your insurance coverage.  Please reach out to your insurance provider with any questions.    If during the course of the call the physician/provider feels a video visit is not appropriate, you will not be charged for this service.\"    Patient has given verbal consent for Video visit? Yes  How would you like to obtain your AVS? MyChart  If you are dropped from the video visit, the video invite should be resent to: Text to cell phone: [phone]   Will anyone else be joining your video visit? No      Subjective     Cecile Lim is a 39 year old female who presents today via video visit for the following health issues:    HPI     Chief Complaint   Patient presents with     Hypertension     Follow up.     Anxiety     Follow up.     Depression     Follow up.          Hypertension Follow-up      Do you check your blood pressure regularly outside of the clinic? No     Are you following a low salt diet? Yes    Are your blood pressures ever more than 140 on the top number (systolic) OR more   than 90 on the bottom number (diastolic), for example 140/90? No     BP Readings from Last 6 Encounters:   02/05/20 120/66   01/15/20 124/82   09/20/19 126/85   04/10/19 124/76   12/13/18 126/82   12/07/18 104/70       Overweight: was 170, now at 148 lbs. Goal is " 130.  Has also made changes with diet and exercise. Wonders about higher dose of topiramate    Depression and Anxiety Follow-Up    How are you doing with your depression since your last visit? Improved     How are you doing with your anxiety since your last visit?  Improved     Are you having other symptoms that might be associated with depression or anxiety? No    Have you had a significant life event? No     Do you have any concerns with your use of alcohol or other drugs? No    Social History     Tobacco Use     Smoking status: Never Smoker     Smokeless tobacco: Never Used   Substance Use Topics     Alcohol use: Yes     Alcohol/week: 0.0 standard drinks     Comment: Rare. Maybe one drink a month     Drug use: No     PHQ 6/1/2020 6/30/2020 10/16/2020   PHQ-9 Total Score 6 10 3   Q9: Thoughts of better off dead/self-harm past 2 weeks Not at all Not at all Not at all     LAURA-7 SCORE 6/1/2020 6/30/2020 10/16/2020   Total Score - - -   Total Score 12 (moderate anxiety) 17 (severe anxiety) -   Total Score 12 17 2     Last PHQ-9 10/16/2020   1.  Little interest or pleasure in doing things 0   2.  Feeling down, depressed, or hopeless 0   3.  Trouble falling or staying asleep, or sleeping too much 1   4.  Feeling tired or having little energy 1   5.  Poor appetite or overeating 0   6.  Feeling bad about yourself 0   7.  Trouble concentrating 1   8.  Moving slowly or restless 0   Q9: Thoughts of better off dead/self-harm past 2 weeks 0   PHQ-9 Total Score 3   Difficulty at work, home, or with people Not difficult at all     LAURA-7  10/16/2020   1. Feeling nervous, anxious, or on edge 0   2. Not being able to stop or control worrying 0   3. Worrying too much about different things 0   4. Trouble relaxing 1   5. Being so restless that it is hard to sit still 1   6. Becoming easily annoyed or irritable 0   7. Feeling afraid, as if something awful might happen 0   LAURA-7 Total Score 2   If you checked any problems, how  "difficult have they made it for you to do your work, take care of things at home, or get along with other people? Not difficult at all       Suicide Assessment Five-step Evaluation and Treatment (SAFE-T)         Video Start Time: 9:04 AM        Review of Systems   Constitutional, HEENT, cardiovascular, pulmonary, gi and gu systems are negative, except as otherwise noted.      Objective           Vitals:  No vitals were obtained today due to virtual visit.    Physical Exam     GENERAL: Healthy, alert and no distress  EYES: Eyes grossly normal to inspection.  No discharge or erythema, or obvious scleral/conjunctival abnormalities.  RESP: No audible wheeze, cough, or visible cyanosis.  No visible retractions or increased work of breathing.    SKIN: Visible skin clear. No significant rash, abnormal pigmentation or lesions.  NEURO: Cranial nerves grossly intact.  Mentation and speech appropriate for age.  PSYCH: Mentation appears normal, affect normal/bright, judgement and insight intact, normal speech and appearance well-groomed.              Assessment & Plan     Cecile was seen today for hypertension, anxiety and depression.    Diagnoses and all orders for this visit:    Overweight    BMI 32.0-32.9,adult  -     phentermine (ADIPEX-P) 15 MG capsule; Take 1 capsule (15 mg) by mouth every morning  -     topiramate (TOPAMAX) 100 MG tablet; Take 1 tablet (100 mg) by mouth daily    Essential hypertension         BMI:   Estimated body mass index is 32.51 kg/m  as calculated from the following:    Height as of 2/5/20: 1.54 m (5' 0.63\").    Weight as of 2/5/20: 77.1 kg (170 lb).   Weight management plan: Discussed healthy diet and exercise guidelines         Patient Instructions   1. Keep up the good work with diet and exercise  2. Increase topamax to 100 mg daily  3. Monitor blood pressure periodically and notify DR. Talley if consistently > 140/90  4. Follow-up 3-6 months for next refill      No follow-ups on file.    Fawn" DO SE[facility]      Video-Visit Details    Type of service:  Video Visit    Video End Time:9:17 AM    Originating Location (pt. Location): Home    Distant Location (provider location):  [facility]     Platform used for Video Visit: Marlyn

## 2020-10-16 ENCOUNTER — VIRTUAL VISIT (OUTPATIENT)
Dept: FAMILY MEDICINE | Facility: CLINIC | Age: 40
End: 2020-10-16
Payer: COMMERCIAL

## 2020-10-16 DIAGNOSIS — E66.3 OVERWEIGHT: Primary | ICD-10-CM

## 2020-10-16 DIAGNOSIS — I10 ESSENTIAL HYPERTENSION: ICD-10-CM

## 2020-10-16 PROCEDURE — 99214 OFFICE O/P EST MOD 30 MIN: CPT | Mod: 95 | Performed by: INTERNAL MEDICINE

## 2020-10-16 RX ORDER — PHENTERMINE HYDROCHLORIDE 15 MG/1
15 CAPSULE ORAL EVERY MORNING
Qty: 90 CAPSULE | Refills: 0 | Status: SHIPPED | OUTPATIENT
Start: 2020-10-16 | End: 2021-01-14

## 2020-10-16 RX ORDER — TOPIRAMATE 100 MG/1
100 TABLET, FILM COATED ORAL DAILY
Qty: 90 TABLET | Refills: 0 | Status: SHIPPED | OUTPATIENT
Start: 2020-10-16 | End: 2021-01-14

## 2020-10-16 ASSESSMENT — ANXIETY QUESTIONNAIRES
7. FEELING AFRAID AS IF SOMETHING AWFUL MIGHT HAPPEN: NOT AT ALL
2. NOT BEING ABLE TO STOP OR CONTROL WORRYING: NOT AT ALL
GAD7 TOTAL SCORE: 2
5. BEING SO RESTLESS THAT IT IS HARD TO SIT STILL: SEVERAL DAYS
3. WORRYING TOO MUCH ABOUT DIFFERENT THINGS: NOT AT ALL
IF YOU CHECKED OFF ANY PROBLEMS ON THIS QUESTIONNAIRE, HOW DIFFICULT HAVE THESE PROBLEMS MADE IT FOR YOU TO DO YOUR WORK, TAKE CARE OF THINGS AT HOME, OR GET ALONG WITH OTHER PEOPLE: NOT DIFFICULT AT ALL
1. FEELING NERVOUS, ANXIOUS, OR ON EDGE: NOT AT ALL
6. BECOMING EASILY ANNOYED OR IRRITABLE: NOT AT ALL

## 2020-10-16 ASSESSMENT — PATIENT HEALTH QUESTIONNAIRE - PHQ9
5. POOR APPETITE OR OVEREATING: SEVERAL DAYS
SUM OF ALL RESPONSES TO PHQ QUESTIONS 1-9: 3

## 2020-10-17 ASSESSMENT — ANXIETY QUESTIONNAIRES: GAD7 TOTAL SCORE: 2

## 2020-11-03 ENCOUNTER — VIRTUAL VISIT (OUTPATIENT)
Dept: URGENT CARE | Facility: CLINIC | Age: 40
End: 2020-11-03
Payer: COMMERCIAL

## 2020-11-03 DIAGNOSIS — R09.81 NASAL CONGESTION: Primary | ICD-10-CM

## 2020-11-03 PROCEDURE — 99213 OFFICE O/P EST LOW 20 MIN: CPT | Mod: TEL | Performed by: EMERGENCY MEDICINE

## 2020-11-03 NOTE — PROGRESS NOTES
HPI: Patient is a 39-year-old female who developed a headache and nausea last night.  She had a slight sore throat and nasal congestion.  T-max has been 99.6.  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  Her daughter has had URI symptoms and was exposed to a friend's brother who is positive for Covid.  She has no cough, diarrhea, or shortness of breath.      ROS: See HPI otherwise negative.    No Known Allergies   Current Outpatient Medications   Medication Sig Dispense Refill     ibuprofen (ADVIL/MOTRIN) 600 MG tablet Take 1 tablet (600 mg) by mouth every 6 hours as needed for pain (mild) 30 tablet 0     lisinopril (ZESTRIL) 10 MG tablet Take 1 tablet (10 mg) by mouth daily 90 tablet 3     methylphenidate (RITALIN) 10 MG tablet Take 1 tablet by mouth twice daily as needed. (Patient not taking: Reported on 7/16/2020) 60 tablet 0     methylphenidate (RITALIN) 10 MG tablet Take 1 tablet by mouth twice daily as needed. (Patient not taking: Reported on 7/16/2020) 60 tablet 0     methylphenidate (RITALIN) 10 MG tablet Take 1 tablet by mouth twice daily as needed. 60 tablet 0     methylphenidate (RITALIN) 10 MG tablet Take 1 tablet by mouth twice daily as needed. (Patient not taking: Reported on 7/16/2020) 60 tablet 0     methylphenidate (RITALIN) 10 MG tablet Take 1 tablet by mouth twice daily as needed. (Patient not taking: Reported on 7/16/2020) 60 tablet 0     phentermine (ADIPEX-P) 15 MG capsule Take 1 capsule (15 mg) by mouth every morning 90 capsule 0     topiramate (TOPAMAX) 100 MG tablet Take 1 tablet (100 mg) by mouth daily 90 tablet 0     venlafaxine (EFFEXOR-XR) 37.5 MG 24 hr capsule 112.5 mg once daily (Take with 75 mg) 90 capsule 3     venlafaxine (EFFEXOR-XR) 75 MG 24 hr capsule 112.5 mg once daily (Take with 37.5 mg) 90 capsule 3         PE: No acute distress and nondyspneic sounding on the phone.        TREATMENT: None.      ASSESSMENT: Symptoms suspicious  for Covid-like illness but no marked concerning clinical symptoms at this time.      DIAGNOSIS: Congestion      PLAN: Covid PCR ordered.  Testing team to follow.  PCP as needed for worsening symptoms or concerns.    Time: 10 minutes

## 2020-11-10 ENCOUNTER — TELEPHONE (OUTPATIENT)
Dept: INTERNAL MEDICINE | Facility: CLINIC | Age: 40
End: 2020-11-10

## 2020-11-10 NOTE — TELEPHONE ENCOUNTER
Test Results    Signed, faxed to [phone] and sent to [employer]. Bere Smith on 11/10/2020 at 9:34 AM

## 2020-12-07 ENCOUNTER — AMBULATORY - HEALTHEAST (OUTPATIENT)
Dept: FAMILY MEDICINE | Facility: CLINIC | Age: 40
End: 2020-12-07

## 2020-12-07 ENCOUNTER — VIRTUAL VISIT (OUTPATIENT)
Dept: FAMILY MEDICINE | Facility: CLINIC | Age: 40
End: 2020-12-07
Payer: COMMERCIAL

## 2020-12-07 DIAGNOSIS — R51.9 NONINTRACTABLE HEADACHE, UNSPECIFIED CHRONICITY PATTERN, UNSPECIFIED HEADACHE TYPE: ICD-10-CM

## 2020-12-07 DIAGNOSIS — R09.81 CONGESTION OF PARANASAL SINUS: ICD-10-CM

## 2020-12-07 DIAGNOSIS — R51.9 NONINTRACTABLE HEADACHE, UNSPECIFIED CHRONICITY PATTERN, UNSPECIFIED HEADACHE TYPE: Primary | ICD-10-CM

## 2020-12-07 PROCEDURE — 99213 OFFICE O/P EST LOW 20 MIN: CPT | Mod: TEL | Performed by: NURSE PRACTITIONER

## 2020-12-07 RX ORDER — CETIRIZINE HYDROCHLORIDE 10 MG/1
10 TABLET ORAL DAILY
Qty: 90 TABLET | Refills: 1 | Status: SHIPPED | OUTPATIENT
Start: 2020-12-07 | End: 2023-03-31

## 2020-12-07 NOTE — PROGRESS NOTES
"Cecile Lim is a 39 year old female who is being evaluated via a billable telephone visit.      The patient has been notified of following:     \"This telephone visit will be conducted via a call between you and your physician/provider. We have found that certain health care needs can be provided without the need for a physical exam.  This service lets us provide the care you need with a short phone conversation.  If a prescription is necessary we can send it directly to your pharmacy.  If lab work is needed we can place an order for that and you can then stop by our lab to have the test done at a later time.    Telephone visits are billed at different rates depending on your insurance coverage. During this emergency period, for some insurers they may be billed the same as an in-person visit.  Please reach out to your insurance provider with any questions.    If during the course of the call the physician/provider feels a telephone visit is not appropriate, you will not be charged for this service.\"    Patient has given verbal consent for Telephone visit?  Yes    What phone number would you like to be contacted at? [phone]    How would you like to obtain your AVS? Wu Murry     Cecile Lim is a 39 year old female who presents via phone visit today for the following health issues:    HPI       Concern for COVID-19-She is not sure if her symptoms could be either from allergies, Covid or a medication change.  About how many days ago did these symptoms start? Friday and over the weekend.  Is this your first visit for this illness? Yes-for this set of symptoms.  She has had previous testing done.  In the 14 days before your symptoms started, have you had close contact with someone with COVID-19 (Coronavirus)? Yes, I have been in contact with someone who has COVID-19/Coronavirus (confirmed by lab test).  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  Do you have a fever or " chills? No  Are you having new or worsening difficulty breathing? No  Do you have new or worsening cough? No  Have you had any new or unexplained body aches? No    Have you experienced any of the following NEW symptoms?    Headache: YES-lingering headache starting on Friday.  She did have a dose change on Thursday for her Topiramate, she went from 100 mg to 50 mg.  So she is not sure if the lingering headache is related to that.    Sore throat: YES-over the weekend-she was outside.    Loss of taste or smell: No    Chest pain: No    Diarrhea: No    Rash: No     She is having nasal congestion with the sore throat.  What treatments have you tried? She didn't try any treatment as she was wanting to see if her symptoms would improve if they were allergy related.  Who do you live with?  and two children.  Are you, or a household member, a healthcare worker or a ? YES-patient is a RN.  Do you live in a nursing home, group home, or shelter? No  Do you have a way to get food/medications if quarantined? Yes, I have a friend or family member who can help me.    Above HPI reviewed. Additionally, RN at [employer]. Reduced topiramate dose on Thursday due to side effects, developed HA on Friday. Nasal congestion, sore throat over next few days. Increased fatigue.                    Review of Systems   Constitutional, HEENT, cardiovascular, pulmonary, gi and gu systems are negative, except as otherwise noted.       Objective          Vitals:  No vitals were obtained today due to virtual visit.    healthy, alert and no distress  PSYCH: Alert and oriented times 3; coherent speech, normal   rate and volume, able to articulate logical thoughts, able   to abstract reason, no tangential thoughts, no hallucinations   or delusions  Her affect is normal  RESP: No cough, no audible wheezing, able to talk in full sentences  Remainder of exam unable to be completed due to telephone visits             Assessment/Plan:    Assessment & Plan     Nonintractable headache, unspecified chronicity pattern, unspecified headache type  Given symptoms and significant increase in COVID cases in this area, I have recommend that she be tested for COVID prior to returning to work. She is amenable with this.  - Symptomatic COVID-19 Virus (Coronavirus) by PCR; Future    Congestion of paranasal sinus  Has had several COVID tests due to similar symptoms, and timing of these suggests she may actually have an allergic rhinitis. Will have her start cetirizine to see if this helps alleviate symptoms.  - Symptomatic COVID-19 Virus (Coronavirus) by PCR; Future  - cetirizine (ZYRTEC) 10 MG tablet; Take 1 tablet (10 mg) by mouth daily        Patient Instructions   Start Zyrtec daily  Schedule COVID test and we will let you know what it shows.      Thank you for choosing [facility].  You may be receiving an email and/or telephone survey request from [facility] Customer Experience regarding your visit today.  Please take a few minutes to respond to the survey to let us know how we are doing.      If you have questions or concerns, please contact us via [employer] or you can contact your care team at [phone].    Our Clinic hours are:  Monday 6:40 am  to 7:00 pm  Tuesday -Friday 6:40 am to 5:00 pm    The [place] outpatient lab hours are:  Monday - Friday 6:10 am to 4:45 pm  Saturdays 7:00 am to 11:00 am  Appointments are required, call [phone]    If you have clinical questions after hours or would like to schedule an appointment,  call the clinic at [phone].        Return in about 1 week (around 12/14/2020) for worsening or continued symptoms.    JOAQUIN Teague [facility]    Phone call duration:  5 minutes

## 2020-12-07 NOTE — PATIENT INSTRUCTIONS
Start Zyrtec daily  Schedule COVID test and we will let you know what it shows.      Thank you for choosing [facility].  You may be receiving an email and/or telephone survey request from [facility] Customer Experience regarding your visit today.  Please take a few minutes to respond to the survey to let us know how we are doing.      If you have questions or concerns, please contact us via [employer] or you can contact your care team at [phone].    Our Clinic hours are:  Monday 6:40 am  to 7:00 pm  Tuesday -Friday 6:40 am to 5:00 pm    The [place] outpatient lab hours are:  Monday - Friday 6:10 am to 4:45 pm  Saturdays 7:00 am to 11:00 am  Appointments are required, call [phone]    If you have clinical questions after hours or would like to schedule an appointment,  call the clinic at [phone].

## 2020-12-09 ENCOUNTER — COMMUNICATION - HEALTHEAST (OUTPATIENT)
Dept: SCHEDULING | Facility: CLINIC | Age: 40
End: 2020-12-09

## 2021-01-14 ENCOUNTER — VIRTUAL VISIT (OUTPATIENT)
Dept: FAMILY MEDICINE | Facility: CLINIC | Age: 41
End: 2021-01-14
Payer: COMMERCIAL

## 2021-01-14 PROCEDURE — 99214 OFFICE O/P EST MOD 30 MIN: CPT | Mod: TEL | Performed by: INTERNAL MEDICINE

## 2021-01-14 RX ORDER — PHENTERMINE HYDROCHLORIDE 30 MG/1
30 CAPSULE ORAL EVERY MORNING
Qty: 30 CAPSULE | Refills: 3 | Status: SHIPPED | OUTPATIENT
Start: 2021-01-14 | End: 2021-05-11

## 2021-01-14 RX ORDER — TOPIRAMATE 50 MG/1
50 TABLET, FILM COATED ORAL DAILY
Qty: 90 TABLET | Refills: 3 | Status: SHIPPED | OUTPATIENT
Start: 2021-01-14 | End: 2022-01-25

## 2021-01-14 ASSESSMENT — ANXIETY QUESTIONNAIRES
1. FEELING NERVOUS, ANXIOUS, OR ON EDGE: NOT AT ALL
5. BEING SO RESTLESS THAT IT IS HARD TO SIT STILL: SEVERAL DAYS
IF YOU CHECKED OFF ANY PROBLEMS ON THIS QUESTIONNAIRE, HOW DIFFICULT HAVE THESE PROBLEMS MADE IT FOR YOU TO DO YOUR WORK, TAKE CARE OF THINGS AT HOME, OR GET ALONG WITH OTHER PEOPLE: SOMEWHAT DIFFICULT
6. BECOMING EASILY ANNOYED OR IRRITABLE: SEVERAL DAYS
7. FEELING AFRAID AS IF SOMETHING AWFUL MIGHT HAPPEN: NOT AT ALL
2. NOT BEING ABLE TO STOP OR CONTROL WORRYING: NOT AT ALL
3. WORRYING TOO MUCH ABOUT DIFFERENT THINGS: NOT AT ALL
GAD7 TOTAL SCORE: 3

## 2021-01-14 ASSESSMENT — PATIENT HEALTH QUESTIONNAIRE - PHQ9
SUM OF ALL RESPONSES TO PHQ QUESTIONS 1-9: 4
5. POOR APPETITE OR OVEREATING: SEVERAL DAYS

## 2021-01-14 NOTE — PROGRESS NOTES
Cecile is a 40 year old who is being evaluated via a billable telephone visit.      What phone number would you like to be contacted at? [phone]  How would you like to obtain your AVS? MyChart  Assessment & Plan     BMI 32.0-32.9,adult - continue meds, increase phentermine. Follow-up 3 months  - topiramate (TOPAMAX) 50 MG tablet; Take 1 tablet (50 mg) by mouth daily  - phentermine (ADIPEX-P) 30 MG capsule; Take 1 capsule (30 mg) by mouth every morning                         Patient Instructions   1.  Continue the lower dose of Topamax at 50 mg  2. Increase phentermine to 30 mg once daily.  Watch for side effects of hypertension, racing heart, worsening anxiety or insomnia  3. Follow-up in 3 months.      No follow-ups on file.    Fawn Talley, [facility]     Cecile is a 40 year old who presents to clinic today for the following health issues     HPI     Medication Followup of topiramate (TOPAMAX) 100 MG tablet    Taking Medication as prescribed: no , taking half now    Side Effects:  YES - brain foggy    Medication Helping Symptoms:  Yes    Last visit in Oct we increased topamax to 100 mg.  Developed brain fog and forgetfulness on higher dose    She self decreased to 50 mg and brain fog resolved    Wt 145.  Sergio 142.  Holidays increased wt a bit.  Goal is 130        Medication Followup of phentermine (ADIPEX-P) 15 MG capsule    Taking Medication as prescribed: yes    Side Effects:  None    Medication Helping Symptoms:  Yes    No side effects.      Checking blood pressure and is 110.    Is not using ritalin daily.          Hypertension Follow-up      Do you check your blood pressure regularly outside of the clinic? Yes 110/70    Are you following a low salt diet? Yes    Are your blood pressures ever more than 140 on the top number (systolic) OR more   than 90 on the bottom number (diastolic), for example 140/90? No    Depression and Anxiety Follow-Up    How are you  doing with your depression since your last visit? Worsened     How are you doing with your anxiety since your last visit?  Worsened     Are you having other symptoms that might be associated with depression or anxiety? No    Have you had a significant life event? No     Do you have any concerns with your use of alcohol or other drugs? No    Social History     Tobacco Use     Smoking status: Never Smoker     Smokeless tobacco: Never Used   Substance Use Topics     Alcohol use: Yes     Alcohol/week: 0.0 standard drinks     Comment: Rare. Maybe one drink a month     Drug use: No     PHQ 6/30/2020 10/16/2020 1/14/2021   PHQ-9 Total Score 10 3 4   Q9: Thoughts of better off dead/self-harm past 2 weeks Not at all Not at all Not at all     LAURA-7 SCORE 6/30/2020 10/16/2020 1/14/2021   Total Score - - -   Total Score 17 (severe anxiety) - -   Total Score 17 2 3     Last PHQ-9 1/14/2021   1.  Little interest or pleasure in doing things 0   2.  Feeling down, depressed, or hopeless 0   3.  Trouble falling or staying asleep, or sleeping too much 2   4.  Feeling tired or having little energy 2   5.  Poor appetite or overeating 0   6.  Feeling bad about yourself 0   7.  Trouble concentrating 0   8.  Moving slowly or restless 0   Q9: Thoughts of better off dead/self-harm past 2 weeks 0   PHQ-9 Total Score 4   Difficulty at work, home, or with people Not difficult at all     LAURA-7  1/14/2021   1. Feeling nervous, anxious, or on edge 0   2. Not being able to stop or control worrying 0   3. Worrying too much about different things 0   4. Trouble relaxing 1   5. Being so restless that it is hard to sit still 1   6. Becoming easily annoyed or irritable 1   7. Feeling afraid, as if something awful might happen 0   LAURA-7 Total Score 3   If you checked any problems, how difficult have they made it for you to do your work, take care of things at home, or get along with other people? Somewhat difficult       Suicide Assessment Five-step  Evaluation and Treatment (SAFE-T)        Review of Systems   Constitutional, HEENT, cardiovascular, pulmonary, gi and gu systems are negative, except as otherwise noted.      Objective           Vitals:  No vitals were obtained today due to virtual visit.    Physical Exam   healthy, alert and no distress  PSYCH: Alert and oriented times 3; coherent speech, normal   rate and volume, able to articulate logical thoughts, able   to abstract reason, no tangential thoughts, no hallucinations   or delusions  Her affect is normal  RESP: No cough, no audible wheezing, able to talk in full sentences  Remainder of exam unable to be completed due to telephone visits                Phone call duration: 10 minutes

## 2021-01-14 NOTE — PATIENT INSTRUCTIONS
1.  Continue the lower dose of Topamax at 50 mg  2. Increase phentermine to 30 mg once daily.  Watch for side effects of hypertension, racing heart, worsening anxiety or insomnia  3. Follow-up in 3 months.

## 2021-01-15 ASSESSMENT — ANXIETY QUESTIONNAIRES: GAD7 TOTAL SCORE: 3

## 2021-04-03 ENCOUNTER — HEALTH MAINTENANCE LETTER (OUTPATIENT)
Age: 41
End: 2021-04-03

## 2021-05-11 RX ORDER — PHENTERMINE HYDROCHLORIDE 30 MG/1
30 CAPSULE ORAL EVERY MORNING
Qty: 30 CAPSULE | Refills: 3 | Status: SHIPPED | OUTPATIENT
Start: 2021-05-11 | End: 2021-09-09

## 2021-05-11 NOTE — TELEPHONE ENCOUNTER
Routing refill request to provider for review/approval because:  Drug not on the [facility] refill protocol     Latonya Kilpatrick RN    Requested Prescriptions   Pending Prescriptions Disp Refills     phentermine (ADIPEX-P) 30 MG capsule [Pharmacy Med Name: phentermine 30 mg capsule] 30 capsule 3     Sig: Take 1 capsule (30 mg) by mouth every morning       There is no refill protocol information for this order

## 2021-06-10 ENCOUNTER — OFFICE VISIT (OUTPATIENT)
Dept: PODIATRY | Facility: CLINIC | Age: 41
End: 2021-06-10
Payer: COMMERCIAL

## 2021-06-10 VITALS
WEIGHT: 170 LBS | HEART RATE: 72 BPM | BODY MASS INDEX: 32.1 KG/M2 | SYSTOLIC BLOOD PRESSURE: 126 MMHG | DIASTOLIC BLOOD PRESSURE: 86 MMHG | HEIGHT: 61 IN

## 2021-06-10 DIAGNOSIS — L60.0 INGROWN NAIL OF GREAT TOE OF RIGHT FOOT: ICD-10-CM

## 2021-06-10 DIAGNOSIS — L85.1 ACQUIRED PLANTAR POROKERATOSIS: Primary | ICD-10-CM

## 2021-06-10 PROCEDURE — 17110 DESTRUCTION B9 LES UP TO 14: CPT | Mod: 59 | Performed by: PODIATRIST

## 2021-06-10 PROCEDURE — 11750 EXCISION NAIL&NAIL MATRIX: CPT | Mod: T5 | Performed by: PODIATRIST

## 2021-06-10 PROCEDURE — 99203 OFFICE O/P NEW LOW 30 MIN: CPT | Mod: 25 | Performed by: PODIATRIST

## 2021-06-10 ASSESSMENT — MIFFLIN-ST. JEOR: SCORE: 1362.61

## 2021-06-10 NOTE — PATIENT INSTRUCTIONS
Post toenail procedure instructions:    1. Keep bandages on for the rest of the day; take off this evening.  2. Soak the foot and toe in warm water with Epsom's salt or Dreft detergent for 20 min.  3. Clean the toe and the treated area with a soapy wash cloth.  4. Apply a topical antibiotic (Bacitracin) to the treated area and cover with a Band-Aid  5. Repeat this morning and night for two weeks, or until the treated are resolves any redness or drainage.  a. Redness and drainage is normal when treated with the Phenol acid.  b. Notify the office if there is any redness extending up the foot or purulent drainage noted.    Any discomfort should be treated with either Ibuprofen (Advil) or Tylenol.  Please follow package instructions on amount to be taken.

## 2021-06-10 NOTE — NURSING NOTE
"Chief Complaint   Patient presents with     Ingrown Toenail     great right toenail     Consult     stepped on a sticker or thorn in [place] 2 years ago and still having pain       Initial /86   Pulse 72   Ht 1.54 m (5' 0.63\")   Wt 77.1 kg (170 lb)   BMI 32.51 kg/m   Estimated body mass index is 32.51 kg/m  as calculated from the following:    Height as of this encounter: 1.54 m (5' 0.63\").    Weight as of this encounter: 77.1 kg (170 lb).  Medications and allergies reviewed.      Yolanda SEARS MA    "

## 2021-06-10 NOTE — PROGRESS NOTES
"Cecile Lim is a 40 year old female who presents with a chief complain of a painful ingrown toenail to the right great toe.  The patient relates pain when wearing shoes.  The patient denies any redness extending up the big toe into the foot.  The patient relates the condition has been getting worse over the past several weeks.         Pertinent medical, surgical and family history include hypertension    Vitals: /86   Pulse 72   Ht 1.54 m (5' 0.63\")   Wt 77.1 kg (170 lb)   BMI 32.51 kg/m    BMI= Body mass index is 32.51 kg/m .    LOWER EXTREMITY PHYSICAL EXAM    Dermatologic: One notes an inflamed nail border of the right great toe.  There is noted erythema and edema located around the labial fold and does not extend past the interphalangeal joint.  There is no apparent purulent drainage noted.  There is pain on palpation to the proximal aspect of the nail border.  Noted hyper keratotic lesion under the ball of the right foot with no surrounding erythema or edema noted.  Noted subdermal hemorrhage around the area.  No evidence of foreign body.     Vascular: DP & PT pulses are intact & regular on the right.   CFT and skin temperature is normal to the right lower extremities.     Neurologic: Lower extremity sensation is intact to light touch.  No evidence of weakness in the right lower extremities.        Musculoskeletal: Patient is ambulatory without assistive device or brace.  No gross ankle deformity noted.  No foot or ankle joint effusion is noted.         ASSESSMENT / PLAN:     ICD-10-CM    1. Acquired plantar porokeratosis  L85.1    2. Ingrown nail of great toe of right foot  L60.0        Plan:  I have explained to Cecile about the condition.  The potential causes and nature of an ingrown toenail were discussed with the patient.  We reviewed the natural history and prognosis of the condition and potential risks if no treatment is provided.  Treatment options discussed included conservative " management (oral antibiotics, soaking of foot, adequate width shoes)  as well as surgical management (partial or total nail removal).  The pros and cons of both forms as well as risks and benefits of treatment were reviewed.       At this point, I recommended having the offending nail border permanently removed.  I have explained to the patient all of the possible risks, benefits, alternatives to the procedure.  The patient consented to the proposed procedure.  The right hallux was swabbed with alcohol.  Next, approximately 3 cc of 1% lidocaine plain was injected around the right hallux.  The right hallux was then prepped with Betadine ointment.  A tourniquet was applied to the right hallux.  The offending nail border was split using an English anvil back to the matrix.  Next, utilizing a straight hemostat the offending nail border was avulsed with the attached matrix.  The wound bed was debrided on any remaining nail, matrix and skin.  Next, the offending nail border was treated with 89% phenol using microtip cotton applicators for 30 seconds.  The wound was then irrigated and dressed with Triple antibiotic ointment and a compressive dry sterile dressing.  The tourniquet was removed and a prompt hyperemic response was noted.  The patient tolerated the procedure well with no complications.  The patient was given post procedure instructions for the care of the wound.  The patient was informed that it is common to experience redness with watery drainage coming from the treated areas of the toe related to the phenol application.  The patient was instructed to notify the office if any redness extending past the big toe joint or fever with chills are experienced.      The lesion on the bottom of the ball of the right foot was debrided down to healthy epidermis.  Next a topical application of Cantharone was placed over the lesion and bandaged.      There is low risk of morbidity with the procedure.  There was no overlap  in work associated with the evaluation/management and the work associated with the procedure.    Cecile verbalized agreement with and understanding of the rational for the diagnosis and treatment plan.  All questions were answered to best of my ability and the patient's satisfaction. The patient was advised to contact the clinic with any questions that may arise after the clinic visit.      Disclaimer: This note consists of symbols derived from keyboarding, dictation and/or voice recognition software. As a result, there may be errors in the script that have gone undetected. Please consider this when interpreting information found in this chart.      GIULIA Veronica D.P.M., F.DORYS.C.F.A.S.

## 2021-06-10 NOTE — LETTER
"    6/10/2021         RE: Cecile Lim  [address] [address] [place] [id number]        Dear Colleague,    Thank you for referring your patient, Cecile Lim, to the [facility] [place]. Please see a copy of my visit note below.    Cecile Lim is a 40 year old female who presents with a chief complain of a painful ingrown toenail to the right great toe.  The patient relates pain when wearing shoes.  The patient denies any redness extending up the big toe into the foot.  The patient relates the condition has been getting worse over the past several weeks.         Pertinent medical, surgical and family history include hypertension    Vitals: /86   Pulse 72   Ht 1.54 m (5' 0.63\")   Wt 77.1 kg (170 lb)   BMI 32.51 kg/m    BMI= Body mass index is 32.51 kg/m .    LOWER EXTREMITY PHYSICAL EXAM    Dermatologic: One notes an inflamed nail border of the right great toe.  There is noted erythema and edema located around the labial fold and does not extend past the interphalangeal joint.  There is no apparent purulent drainage noted.  There is pain on palpation to the proximal aspect of the nail border.  Noted hyper keratotic lesion under the ball of the right foot with no surrounding erythema or edema noted.  Noted subdermal hemorrhage around the area.  No evidence of foreign body.     Vascular: DP & PT pulses are intact & regular on the right.   CFT and skin temperature is normal to the right lower extremities.     Neurologic: Lower extremity sensation is intact to light touch.  No evidence of weakness in the right lower extremities.        Musculoskeletal: Patient is ambulatory without assistive device or brace.  No gross ankle deformity noted.  No foot or ankle joint effusion is noted.         ASSESSMENT / PLAN:     ICD-10-CM    1. Acquired plantar porokeratosis  L85.1    2. Ingrown nail of great toe of right foot  L60.0        Plan:  I have explained to Cecile about the " condition.  The potential causes and nature of an ingrown toenail were discussed with the patient.  We reviewed the natural history and prognosis of the condition and potential risks if no treatment is provided.  Treatment options discussed included conservative management (oral antibiotics, soaking of foot, adequate width shoes)  as well as surgical management (partial or total nail removal).  The pros and cons of both forms as well as risks and benefits of treatment were reviewed.       At this point, I recommended having the offending nail border permanently removed.  I have explained to the patient all of the possible risks, benefits, alternatives to the procedure.  The patient consented to the proposed procedure.  The right hallux was swabbed with alcohol.  Next, approximately 3 cc of 1% lidocaine plain was injected around the right hallux.  The right hallux was then prepped with Betadine ointment.  A tourniquet was applied to the right hallux.  The offending nail border was split using an English anvil back to the matrix.  Next, utilizing a straight hemostat the offending nail border was avulsed with the attached matrix.  The wound bed was debrided on any remaining nail, matrix and skin.  Next, the offending nail border was treated with 89% phenol using microtip cotton applicators for 30 seconds.  The wound was then irrigated and dressed with Triple antibiotic ointment and a compressive dry sterile dressing.  The tourniquet was removed and a prompt hyperemic response was noted.  The patient tolerated the procedure well with no complications.  The patient was given post procedure instructions for the care of the wound.  The patient was informed that it is common to experience redness with watery drainage coming from the treated areas of the toe related to the phenol application.  The patient was instructed to notify the office if any redness extending past the big toe joint or fever with chills are experienced.       The lesion on the bottom of the ball of the right foot was debrided down to healthy epidermis.  Next a topical application of Cantharone was placed over the lesion and bandaged.      There is low risk of morbidity with the procedure.  There was no overlap in work associated with the evaluation/management and the work associated with the procedure.    Cecile verbalized agreement with and understanding of the rational for the diagnosis and treatment plan.  All questions were answered to best of my ability and the patient's satisfaction. The patient was advised to contact the clinic with any questions that may arise after the clinic visit.      Disclaimer: This note consists of symbols derived from keyboarding, dictation and/or voice recognition software. As a result, there may be errors in the script that have gone undetected. Please consider this when interpreting information found in this chart.      GIULIA Veronica D.P.M., F.A.C.F.A.S.        Again, thank you for allowing me to participate in the care of your patient.        Sincerely,        Miguel Angel Veronica DPM

## 2021-06-12 ENCOUNTER — HOSPITAL ENCOUNTER (EMERGENCY)
Facility: CLINIC | Age: 41
Discharge: HOME | End: 2021-06-12
Attending: PHYSICIAN ASSISTANT | Admitting: PHYSICIAN ASSISTANT
Payer: COMMERCIAL

## 2021-06-12 VITALS
OXYGEN SATURATION: 100 % | BODY MASS INDEX: 27.75 KG/M2 | WEIGHT: 147 LBS | DIASTOLIC BLOOD PRESSURE: 89 MMHG | RESPIRATION RATE: 16 BRPM | TEMPERATURE: 98.2 F | HEART RATE: 74 BPM | HEIGHT: 61 IN | SYSTOLIC BLOOD PRESSURE: 132 MMHG

## 2021-06-12 DIAGNOSIS — L03.031 CELLULITIS OF RIGHT TOE: ICD-10-CM

## 2021-06-12 PROCEDURE — G0463 HOSPITAL OUTPT CLINIC VISIT: HCPCS | Performed by: PHYSICIAN ASSISTANT

## 2021-06-12 PROCEDURE — 99214 OFFICE O/P EST MOD 30 MIN: CPT | Performed by: PHYSICIAN ASSISTANT

## 2021-06-12 RX ORDER — CEPHALEXIN 500 MG/1
500 CAPSULE ORAL 4 TIMES DAILY
Qty: 28 CAPSULE | Refills: 0 | Status: SHIPPED | OUTPATIENT
Start: 2021-06-12 | End: 2021-06-19

## 2021-06-12 ASSESSMENT — MIFFLIN-ST. JEOR: SCORE: 1264.17

## 2021-06-13 NOTE — ED PROVIDER NOTES
History     Chief Complaint   Patient presents with     Toe Pain     HPI  Cecile Lim is a 40 year old female who presents to the urgent care with concern over right great toe pain.  Patient reports that she had ingrown nail removed in podiatry 2 days prior to arrival.  Since then she has had persistent pain, swelling and has noted some purulent discharge from the wound.  She has had some nausea which she attributes to her discomfort.  She denies any fever, chills, myalgias, cough, dyspnea, wheezing, new onset numbness or paresthesias.  She has attempted to treat with ibuprofen without relief.  She has been performing warm soaks as directed.  She denies any history of MRSA.    Allergies:  No Known Allergies    Problem List:    Patient Active Problem List    Diagnosis Date Noted     Encounter for surveillance of contraceptives 03/10/2016     Priority: Medium      has had vasectomy       Lipoma of back 11/12/2015     Priority: Medium     Major depressive disorder, recurrent episode, in partial remission (HCC) 12/23/2014     Priority: Medium     Essential hypertension 09/18/2012     Priority: Medium     Stress incontinence, female 02/27/2012     Priority: Medium     CARDIOVASCULAR SCREENING; LDL GOAL LESS THAN 160 10/31/2010     Priority: Medium     Generalized anxiety disorder 10/20/2010     Priority: Medium     Ureteral stone 07/09/2009     Priority: Medium        Past Medical History:    Past Medical History:   Diagnosis Date     LAURA (generalised anxiety disorder)      HTN (hypertension)      Suicide attempt (H) 11/2014       Past Surgical History:    Past Surgical History:   Procedure Laterality Date     EXCISE MASS TRUNK N/A 11/29/2018    Procedure: Excision of Left Back Mass;  Surgeon: Rolando Claros DO;  Location: [facility] OR     EXCISE MASS TRUNK N/A 12/7/2018    Procedure: Evacuation of back hematoma with placement of drain;  Surgeon: Rolando Claros DO;  Location: [facility] OR     NO  HISTORY OF SURGERY  1/2007     SLING TRANSVAGINAL N/A 4/27/2018    Procedure: SLING TRANSVAGINAL;  Pubovaginal sling ([place]);  Surgeon: Douglas Hopkins MD;  Location: [facility] OR     SOFT TISSUE SURGERY         Family History:    Family History   Problem Relation Age of Onset     Hypertension Mother      Musculoskeletal Disorder Mother         degenerative disc disease     Thyroid Disease Mother      Depression Mother      Cerebrovascular Disease Mother      Hypertension Father      Cerebrovascular Disease Father         age 40     Hypertension Maternal Grandfather      Cardiovascular Maternal Grandfather      Cancer Paternal Grandfather         lung     Asthma No family hx of      C.A.D. No family hx of      Diabetes No family hx of      Breast Cancer No family hx of      Cancer - colorectal No family hx of      Prostate Cancer No family hx of        Social History:  Marital Status:   [2]  Social History     Tobacco Use     Smoking status: Never Smoker     Smokeless tobacco: Never Used   Substance Use Topics     Alcohol use: Yes     Alcohol/week: 0.0 standard drinks     Comment: Rare. Maybe one drink a month     Drug use: No        Medications:    cephALEXin (KEFLEX) 500 MG capsule  APPLE CIDER VINEGAR PO  cetirizine (ZYRTEC) 10 MG tablet  lisinopril (ZESTRIL) 10 MG tablet  methylphenidate (RITALIN) 10 MG tablet  methylphenidate (RITALIN) 10 MG tablet  phentermine (ADIPEX-P) 30 MG capsule  topiramate (TOPAMAX) 50 MG tablet  venlafaxine (EFFEXOR-XR) 37.5 MG 24 hr capsule  venlafaxine (EFFEXOR-XR) 75 MG 24 hr capsule      Review of Systems  CONSTITUTIONAL:NEGATIVE for fever, chills, change in weight  INTEGUMENTARY/SKIN: POSITIVE for erythema, drainage at site of nail removal   RESP:NEGATIVE for significant cough or SOB  MUSCULOSKELETAL: POSITIVE  for right great toe pain, swelling and NEGATIVE for other concerning arthralgias or myalgias   NEURO: NEGATIVE for numbness, paresthesias  Physical Exam   BP:  "132/89  Pulse: 74  Temp: 98.2  F (36.8  C)  Resp: 16  Height: 154.9 cm (5' 1\")  Weight: 66.7 kg (147 lb)  SpO2: 100 %  Physical Exam  Constitutional:       General: She is not in acute distress.     Appearance: She is not ill-appearing or toxic-appearing.   Cardiovascular:      Pulses:           Dorsalis pedis pulses are 2+ on the right side.        Posterior tibial pulses are 2+ on the right side.   Musculoskeletal:      Right ankle: Normal.      Right foot: Normal capillary refill. Tenderness and swelling present. No crepitus or laceration.      Comments: Patient does have evidence of a recently excised medial great toenail border. There is some associated tenderness to palpation, distal erythema, swelling and discharge   Skin:     Findings: Erythema (mild to distal aspect of right great toe) present.   Neurological:      Mental Status: She is alert.      Sensory: No sensory deficit.       ED Course        Procedures        Critical Care time:  none        No results found for this or any previous visit (from the past 24 hour(s)).  Medications - No data to display    Assessments & Plan (with Medical Decision Making)     I have reviewed the nursing notes.  I have reviewed the findings, diagnosis, plan and need for follow up with the patient.     New Prescriptions    CEPHALEXIN (KEFLEX) 500 MG CAPSULE    Take 1 capsule (500 mg) by mouth 4 times daily for 7 days     Final diagnoses:   Cellulitis of right toe     40-year-old female presents to the urgent care with concerns over right great toe pain, swelling, erythema after having treatment for ingrown toenail in the podiatry clinic within the last week.  Physical exam findings appear concerning for developing infection/cellulitis.  Differential would also include post surgical inflammatory changes.  She was discharged home stable with prescription for Keflex.  Follow-up with primary care provider or podiatry if no improvement in 3-4 days.  Worrisome reasons to return " to ER/UC sooner discussed.     Disclaimer: This note consists of symbols derived from keyboarding, dictation, and/or voice recognition software. As a result, there may be errors in the script that have gone undetected.  Please consider this when interpreting information found in the chart.    6/12/2021   [facility] EMERGENCY DEPT     Jenae Dyer PA-C  06/15/21 2905

## 2021-06-20 NOTE — LETTER
Letter by Norah Maguire RN at      Author: Norah Maguire RN Service: -- Author Type: --    Filed:  Encounter Date: 5/21/2020 Status: (Other)       5/21/2020        Cecile Lim  [address] [address] [place] [id number]    This letter provides a written record that you were tested for COVID-19 on 5/19/2020.     Your result was negative.    This means that we didnt find the virus that causes COVID-19 in your sample. A test may show negative when you do actually have the virus. This can happen when the virus is in the early stages of infection, before you feel illness symptoms.    Even if you dont have symptoms, they may still appear. For safety, its very important to follow these rules.    Keep yourself away from others (self-isolation):      Stay home. Dont go to work, school or anywhere else.     Stay in your own room (and use your own bathroom), if you can.    Stay away from others in your home. No hugging, kissing or shaking hands. No visitors.    Clean high touch surfaces often (doorknobs, counters, handles, etc.). Use a household cleaning spray or wipes.    Cover your mouth and nose with a mask, tissue or washcloth to avoid spreading germs.    Wash your hands and face often with soap and water.    Stay in self-isolation until you meet ALL of the guidelines below:    1. You have had no fever for at least 72 hours (that is 3 full days of no fever without the use of medicine that reduces fevers), AND  2. other symptoms (such as cough, shortness of breath) have gotten better, AND  3. at least 10 days have passed since your symptoms first appeared.    Going back to work  Check with your employer for any guidelines to follow for going back to work.    Employers: This document serves as formal notice that your employee tested negative for COVID-19, as of the testing date shown above.    For questions regarding this letter or your Negative COVID-19 result, call [phone] between 8A to 6:30P (M-F) and 10A to 6:30P  (weekends).

## 2021-07-09 DIAGNOSIS — I10 ESSENTIAL HYPERTENSION: ICD-10-CM

## 2021-07-09 DIAGNOSIS — F41.1 GENERALIZED ANXIETY DISORDER: Chronic | ICD-10-CM

## 2021-07-09 DIAGNOSIS — F33.1 MAJOR DEPRESSIVE DISORDER, RECURRENT EPISODE, MODERATE (H): Chronic | ICD-10-CM

## 2021-07-13 RX ORDER — LISINOPRIL 10 MG/1
10 TABLET ORAL DAILY
Qty: 90 TABLET | Refills: 1 | Status: SHIPPED | OUTPATIENT
Start: 2021-07-13 | End: 2022-01-07

## 2021-07-13 RX ORDER — VENLAFAXINE HYDROCHLORIDE 75 MG/1
CAPSULE, EXTENDED RELEASE ORAL
Qty: 30 CAPSULE | Refills: 0 | Status: SHIPPED | OUTPATIENT
Start: 2021-07-13 | End: 2021-08-26

## 2021-07-13 RX ORDER — VENLAFAXINE HYDROCHLORIDE 37.5 MG/1
CAPSULE, EXTENDED RELEASE ORAL
Qty: 30 CAPSULE | Refills: 0 | Status: SHIPPED | OUTPATIENT
Start: 2021-07-13 | End: 2021-08-26

## 2021-08-25 DIAGNOSIS — F41.1 GENERALIZED ANXIETY DISORDER: Chronic | ICD-10-CM

## 2021-08-25 DIAGNOSIS — F33.1 MAJOR DEPRESSIVE DISORDER, RECURRENT EPISODE, MODERATE (H): Chronic | ICD-10-CM

## 2021-08-25 NOTE — LETTER
[facility]  [address] [place] [address]  Phone: [phone]       August 26, 2021         Cecile Lim  [address] [address] [place] [id number]            Dear Cecile:    We are concerned about your health care.  We recently provided you with medication refills.  Many medications require routine follow-up with your doctor.    Your prescription(s) have been refilled for 30 days so you may have time for the above noted follow-up. Please call to schedule soon so we can assure you have an appointment before your next refills are needed.    Thank you,      Fawn Talley DO / Alberta RODGERS, RN, BSN

## 2021-08-26 RX ORDER — VENLAFAXINE HYDROCHLORIDE 37.5 MG/1
CAPSULE, EXTENDED RELEASE ORAL
Qty: 30 CAPSULE | Refills: 0 | Status: SHIPPED | OUTPATIENT
Start: 2021-08-26 | End: 2021-09-15

## 2021-08-26 RX ORDER — VENLAFAXINE HYDROCHLORIDE 75 MG/1
CAPSULE, EXTENDED RELEASE ORAL
Qty: 30 CAPSULE | Refills: 0 | Status: SHIPPED | OUTPATIENT
Start: 2021-08-26 | End: 2021-09-15

## 2021-08-26 NOTE — TELEPHONE ENCOUNTER
Covering for PCP (out of clinic today):  One further refill sent, please remind her to follow-up.    Thanks,  Adam Hou MD

## 2021-08-26 NOTE — TELEPHONE ENCOUNTER
Creatinine   Date Value Ref Range Status   07/16/2020 0.59 0.52 - 1.04 mg/dL Final     PHQ 6/30/2020 10/16/2020 1/14/2021   PHQ-9 Total Score 10 3 4   Q9: Thoughts of better off dead/self-harm past 2 weeks Not at all Not at all Not at all     Routing refill request to provider for review/approval because:  Bonita given x1 and patient did not follow up, please advise

## 2021-09-08 NOTE — TELEPHONE ENCOUNTER
Dr. Talley,    Routing refill request to provider for review/approval because:  Drug not on the [facility] refill protocol Kaylynn GREENE RN

## 2021-09-09 RX ORDER — PHENTERMINE HYDROCHLORIDE 30 MG/1
30 CAPSULE ORAL EVERY MORNING
Qty: 30 CAPSULE | Refills: 1 | Status: SHIPPED | OUTPATIENT
Start: 2021-09-09 | End: 2021-11-09

## 2021-09-09 NOTE — TELEPHONE ENCOUNTER
Mary Sue contacted Cecile on 09/09/21 and left a message. If patient calls back please contact care team  Mary Sue on 9/9/2021 at 2:01 PM  .

## 2021-09-15 ENCOUNTER — VIRTUAL VISIT (OUTPATIENT)
Dept: FAMILY MEDICINE | Facility: CLINIC | Age: 41
End: 2021-09-15
Payer: COMMERCIAL

## 2021-09-15 ENCOUNTER — MYC MEDICAL ADVICE (OUTPATIENT)
Dept: FAMILY MEDICINE | Facility: CLINIC | Age: 41
End: 2021-09-15

## 2021-09-15 DIAGNOSIS — F41.1 GENERALIZED ANXIETY DISORDER: Chronic | ICD-10-CM

## 2021-09-15 DIAGNOSIS — J02.9 SORE THROAT: ICD-10-CM

## 2021-09-15 DIAGNOSIS — F33.1 MAJOR DEPRESSIVE DISORDER, RECURRENT EPISODE, MODERATE (H): Primary | ICD-10-CM

## 2021-09-15 PROCEDURE — 99214 OFFICE O/P EST MOD 30 MIN: CPT | Mod: 95 | Performed by: INTERNAL MEDICINE

## 2021-09-15 RX ORDER — VENLAFAXINE HYDROCHLORIDE 37.5 MG/1
CAPSULE, EXTENDED RELEASE ORAL
Qty: 90 CAPSULE | Refills: 3 | Status: SHIPPED | OUTPATIENT
Start: 2021-09-15 | End: 2022-08-25

## 2021-09-15 RX ORDER — VENLAFAXINE HYDROCHLORIDE 75 MG/1
CAPSULE, EXTENDED RELEASE ORAL
Qty: 90 CAPSULE | Refills: 3 | Status: SHIPPED | OUTPATIENT
Start: 2021-09-15 | End: 2022-08-25

## 2021-09-15 NOTE — PROGRESS NOTES
"Cecile is a 40 year old who is being evaluated via a billable telephone visit.      What phone number would you like to be contacted at? [phone]  How would you like to obtain your AVS? MyChart    Assessment & Plan     Major depressive disorder, recurrent episode, moderate (H) -stable, refill provided  - venlafaxine (EFFEXOR-XR) 37.5 MG 24 hr capsule; 112.5 mg (take with 75 mg) once daily  - venlafaxine (EFFEXOR-XR) 75 MG 24 hr capsule; 112.5 mg (take with 37.5 mg) once daily    Sore throat -had testing for strep and Covid at occupational health.  If her strep is positive, she will need me to prescribe medication    Generalized anxiety disorder  - stable, refill provided  - venlafaxine (EFFEXOR-XR) 37.5 MG 24 hr capsule; 112.5 mg (take with 75 mg) once daily  - venlafaxine (EFFEXOR-XR) 75 MG 24 hr capsule; 112.5 mg (take with 37.5 mg) once daily    BMI 32.0-32.9,adult -add maximum dose of phentermine.  Did not tolerate higher dose of Topamax.  Referral to medical weight loss clinic, she will check with insurance.  Naltrexone may be another option?  Might also consider something like Trulicity?  - Comprehensive Weight Management; Future             BMI:   Estimated body mass index is 27.78 kg/m  as calculated from the following:    Height as of 6/12/21: 1.549 m (5' 1\").    Weight as of 6/12/21: 66.7 kg (147 lb).           No follow-ups on file.    Fawn Talley, DO  [employer]    Subjective   Cecile is a 40 year old who presents for the following health issues     HPI     \"Per patient: possible strep/covid, tele # [phone]\"      Medication Followup of phentermine (ADIPEX-P) 30 MG capsule    Taking Medication as prescribed: yes    Side Effects:  None    Medication Helping Symptoms:  Yes    Increased dose a few months ago to 30; and this helped    Feels it is not working as well now, is gaining a bit of wt, 152, lowest was 143    Appetite has increased.    Only taking topamax 25 " because at 50 mg it caused brain fog        Medication Followup of venlafaxine (EFFEXOR-XR) 37.5 MG 24 hr capsule    Taking Medication as prescribed: yes    Side Effects:  None    Medication Helping Symptoms:  yes        Acute Illness   Acute illness concerns: Covid-19   Onset: Today    Fever: no    Chills/Sweats: no    Headache (location?): YES    Sinus Pressure: no    Conjunctivitis:  no    Ear Pain: no    Rhinorrhea: no    Congestion: YES- clear    Sore Throat: YES     Cough: no    Wheeze: no    Decreased Appetite: no    Nausea: no    Vomiting: no    Diarrhea:  no    Dysuria/Freq.: no    Fatigue/Achiness: no    Sick/Strep Exposure: no     Therapies Tried and outcome: na         Concern for COVID-19  About how many days ago did these symptoms start? This morning woke up with some symptoms  Is this your first visit for this illness? Yes  In the 14 days before your symptoms started, have you had close contact with someone with COVID-19 (Coronavirus)? I do not know.  Do you have a fever or chills? No  Are you having new or worsening difficulty breathing? No  Do you have new or worsening cough? No  Have you had any new or unexplained body aches? No    Have you experienced any of the following NEW symptoms?    Headache: YES    Sore throat: YES    Loss of taste or smell: No    Chest pain: No    Diarrhea: No    Rash: No  What treatments have you tried? na  Who do you live with? 2 kids and   Are you, or a household member, a healthcare worker or a ? YES  Do you live in a nursing home, group home, or shelter? No  Do you have a way to get food/medications if quarantined? Yes, I have a friend or family member who can help me.  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      Objective           Vitals:  No vitals were obtained today due to  virtual visit.    Physical Exam   alert, no distress and fatigued  PSYCH: Alert and oriented times 3; coherent speech, normal   rate and volume, able to articulate logical thoughts, able   to abstract reason, no tangential thoughts, no hallucinations   or delusions  Her affect is normal  RESP: No cough, no audible wheezing, able to talk in full sentences  Remainder of exam unable to be completed due to telephone visits                Phone call duration: 11 minutes

## 2021-09-18 ENCOUNTER — HEALTH MAINTENANCE LETTER (OUTPATIENT)
Age: 41
End: 2021-09-18

## 2021-10-10 NOTE — TELEPHONE ENCOUNTER
Occupational [address]  Occupational Therapy Not Seen Note    Patient not available for Occupational Therapy due to:    [] Testing:    [] Hemodialysis    [x] Cancelled by RN: Inappropriate order. Per RN, pt I with no acute needs at this time. OT signing off.     []Refusal by Patient:    [] Surgery:     [] Intubation:     [] Pain Medication:    [] Sedation:     [] Spine Precautions :    [] Medical Instability:    [] Other:    Zuleyka Posadas OTR/L venlafaxine (EFFEXOR-XR) 37.5 MG 24 hr capsule  Last Written Prescription Date: 3/10/16  Last Fill Quantity: 90, # refills: 3  Last Office Visit with [facility] primary care provider:  1/5/17        Last PHQ-9 score on record=   PHQ-9 SCORE 1/5/2017   Total Score -   Total Score MyChart -   Total Score 8     Pt is wanting this filled today   Please advise     Sugey Johnson  [facility] [place]

## 2021-11-09 RX ORDER — PHENTERMINE HYDROCHLORIDE 30 MG/1
30 CAPSULE ORAL EVERY MORNING
Qty: 30 CAPSULE | Refills: 3 | Status: SHIPPED | OUTPATIENT
Start: 2021-11-09 | End: 2022-01-25

## 2022-01-05 DIAGNOSIS — I10 ESSENTIAL HYPERTENSION: ICD-10-CM

## 2022-01-07 RX ORDER — LISINOPRIL 10 MG/1
10 TABLET ORAL DAILY
Qty: 90 TABLET | Refills: 0 | Status: SHIPPED | OUTPATIENT
Start: 2022-01-07 | End: 2022-01-25

## 2022-01-25 ENCOUNTER — VIRTUAL VISIT (OUTPATIENT)
Dept: FAMILY MEDICINE | Facility: CLINIC | Age: 42
End: 2022-01-25
Payer: COMMERCIAL

## 2022-01-25 DIAGNOSIS — F33.1 MAJOR DEPRESSIVE DISORDER, RECURRENT EPISODE, MODERATE (H): ICD-10-CM

## 2022-01-25 DIAGNOSIS — I10 ESSENTIAL HYPERTENSION: Primary | ICD-10-CM

## 2022-01-25 PROCEDURE — 99214 OFFICE O/P EST MOD 30 MIN: CPT | Mod: 95 | Performed by: INTERNAL MEDICINE

## 2022-01-25 RX ORDER — TOPIRAMATE 50 MG/1
50 TABLET, FILM COATED ORAL DAILY
Qty: 90 TABLET | Refills: 3 | Status: SHIPPED | OUTPATIENT
Start: 2022-01-25 | End: 2022-12-30

## 2022-01-25 RX ORDER — LISINOPRIL 20 MG/1
20 TABLET ORAL DAILY
Qty: 90 TABLET | Refills: 0 | Status: SHIPPED | OUTPATIENT
Start: 2022-01-25 | End: 2022-06-03

## 2022-01-25 RX ORDER — PHENTERMINE HYDROCHLORIDE 30 MG/1
30 CAPSULE ORAL EVERY MORNING
Qty: 90 CAPSULE | Refills: 3 | Status: SHIPPED | OUTPATIENT
Start: 2022-01-25 | End: 2022-09-13

## 2022-01-25 NOTE — PATIENT INSTRUCTIONS
Hypertension:  1. Increase lisinopril from 10 to 20 mg  2. RN blood pressure check and no-fasting lab in 2 weeks

## 2022-01-25 NOTE — PROGRESS NOTES
"Cecile is a 41 year old who is being evaluated via a billable video visit.      How would you like to obtain your AVS? MyChart  If the video visit is dropped, the invitation should be resent by: Text to cell phone: [phone] send link to #  Will anyone else be joining your video visit? No      Video Start Time: 5:01 PM    Assessment & Plan     Essential hypertension -uncontrolled. Increase lisinopril from 10-20. The phentermine likely contributes. However, she will be on a stimulant long-term as she has severe hypersomnolence without either phentermine or the methylphenidate.  - lisinopril (ZESTRIL) 20 MG tablet; Take 1 tablet (20 mg) by mouth daily    BMI 32.0-32.9,adult -weight has plateaued which is expected, even though she is not at goal weight. Discussed that maintenance of weight is as important of a goal as weight loss. When she is gone off her weight loss medications, her weight increases markedly and her mental health worsens. Therefore the benefit of the phentermine and Topamax outweighs the risk  - topiramate (TOPAMAX) 50 MG tablet; Take 1 tablet (50 mg) by mouth daily  - phentermine (ADIPEX-P) 30 MG capsule; Take 1 capsule (30 mg) by mouth every morning    Major depressive disorder, recurrent episode, moderate (H) -Known issue that I take into account for their medical decisions, no current exacerbations or new concerns                 BMI:   Estimated body mass index is 27.78 kg/m  as calculated from the following:    Height as of 6/12/21: 1.549 m (5' 1\").    Weight as of 6/12/21: 66.7 kg (147 lb).       Patient Instructions   Hypertension:  1. Increase lisinopril from 10 to 20 mg  2. RN blood pressure check and no-fasting lab in 2 weeks      Return in about 1 month (around 2/25/2022) for Annual Wellness Check-Up.    Fawn Talley DO  [facility]    Subjective   Cecile is a 41 year old who presents for the following health issues     HPI     Hypertension Follow-up      Do " you check your blood pressure regularly outside of the clinic? Yes     Are you following a low salt diet? No    Are your blood pressures ever more than 140 on the top number (systolic) OR more than 90 on the bottom number (diastolic), for example 140/90? Yes     Has been taking double the dose of lisinopril and says that it has helped reduce headaches because she thinks that the hypertension is related to the headaches she's been having.    Blood pressure at home 140-150/90    Headaches      Duration: daily for the last 2 weeks    Description  Location: bilateral in the frontal area   Character: throbbing pain  Frequency:  Almost daily for the last 2 weeks  Duration:  Hours if she doesn't take anything    Intensity:  moderate, 4/10    Accompanying signs and symptoms:    Precipitating or Alleviating factors:  Nausea/vomiting: no  Dizziness: no  Weakness or numbness: no  Visual changes: none  Fever: no   Sinus or URI symptoms no     History  Head trauma: no  Family history of migraines: no  Previous tests for headaches: no  Neurologist evaluations: no  Able to do daily activities when headache present: YES- but it is diffficult  Wake with headaches: no  Daily pain medication use: YES- tylenol and ibuprofen helps  Any changes in: horses are ill    Precipitating or Alleviating factors (light/sound/sleep/caffeine): just tylenol and ibuprofen    Therapies tried and outcome: Ibuprofen (Advil, Motrin) and Tylenol    Outcome - usually effective  Frequent/daily pain medication use: no  --she stopped the phentermine and topiramate to see if they were contributing to headache  --wt has also plateau, so did not feel it was helping.  --slick wt 143, current 155      Idiopathic Hypersomnia with long sleep time  --previously following with sleep clinic, lst visit 9/2019.  Was Rx ritalin 10 mg BID PRN  --since being off phentermine, she is much more tired, drowsy, sleeping too much      Current Outpatient Medications   Medication  Sig Dispense Refill     APPLE CIDER VINEGAR PO        lisinopril (ZESTRIL) 10 MG tablet Take 1 tablet (10 mg) by mouth daily Due for lab 90 tablet 0     phentermine (ADIPEX-P) 30 MG capsule Take 1 capsule (30 mg) by mouth every morning 30 capsule 3     topiramate (TOPAMAX) 50 MG tablet Take 1 tablet (50 mg) by mouth daily 90 tablet 3     venlafaxine (EFFEXOR-XR) 37.5 MG 24 hr capsule 112.5 mg (take with 75 mg) once daily 90 capsule 3     venlafaxine (EFFEXOR-XR) 75 MG 24 hr capsule 112.5 mg (take with 37.5 mg) once daily 90 capsule 3     cetirizine (ZYRTEC) 10 MG tablet Take 1 tablet (10 mg) by mouth daily (Patient not taking: Reported on 6/10/2021) 90 tablet 1         Review of Systems   Constitutional, HEENT, cardiovascular, pulmonary, gi and gu systems are negative, except as otherwise noted.      Objective           Vitals:  No vitals were obtained today due to virtual visit.    Physical Exam   GENERAL: Healthy, alert and no distress  EYES: Eyes grossly normal to inspection.  No discharge or erythema, or obvious scleral/conjunctival abnormalities.  RESP: No audible wheeze, cough, or visible cyanosis.  No visible retractions or increased work of breathing.    SKIN: Visible skin clear. No significant rash, abnormal pigmentation or lesions.  NEURO: Cranial nerves grossly intact.  Mentation and speech appropriate for age.  PSYCH: Mentation appears normal, affect normal/bright, judgement and insight intact, normal speech and appearance well-groomed.                Video-Visit Details    Type of service:  Video Visit    Video End Time:5:18 PM    Originating Location (pt. Location): Home    Distant Location (provider location):  [facility]     Platform used for Video Visit: [employer]

## 2022-02-09 ENCOUNTER — OFFICE VISIT (OUTPATIENT)
Dept: FAMILY MEDICINE | Facility: CLINIC | Age: 42
End: 2022-02-09
Payer: COMMERCIAL

## 2022-02-09 VITALS
BODY MASS INDEX: 28.89 KG/M2 | SYSTOLIC BLOOD PRESSURE: 110 MMHG | DIASTOLIC BLOOD PRESSURE: 78 MMHG | OXYGEN SATURATION: 100 % | HEART RATE: 73 BPM | WEIGHT: 153 LBS | HEIGHT: 61 IN | TEMPERATURE: 98.6 F | RESPIRATION RATE: 14 BRPM

## 2022-02-09 DIAGNOSIS — Z12.4 SCREENING FOR MALIGNANT NEOPLASM OF CERVIX: ICD-10-CM

## 2022-02-09 DIAGNOSIS — Z12.31 VISIT FOR SCREENING MAMMOGRAM: ICD-10-CM

## 2022-02-09 DIAGNOSIS — Z11.59 NEED FOR HEPATITIS C SCREENING TEST: ICD-10-CM

## 2022-02-09 DIAGNOSIS — Z13.1 SCREENING FOR DIABETES MELLITUS: ICD-10-CM

## 2022-02-09 DIAGNOSIS — K64.4 EXTERNAL HEMORRHOIDS: ICD-10-CM

## 2022-02-09 DIAGNOSIS — Z13.6 CARDIOVASCULAR SCREENING; LDL GOAL LESS THAN 160: ICD-10-CM

## 2022-02-09 DIAGNOSIS — I10 ESSENTIAL HYPERTENSION: ICD-10-CM

## 2022-02-09 DIAGNOSIS — Z00.00 ROUTINE GENERAL MEDICAL EXAMINATION AT A HEALTH CARE FACILITY: Primary | ICD-10-CM

## 2022-02-09 DIAGNOSIS — R53.82 CHRONIC FATIGUE: ICD-10-CM

## 2022-02-09 LAB
ALBUMIN SERPL-MCNC: 3.8 G/DL (ref 3.4–5)
ALP SERPL-CCNC: 66 U/L (ref 40–150)
ALT SERPL W P-5'-P-CCNC: 22 U/L (ref 0–50)
ANION GAP SERPL CALCULATED.3IONS-SCNC: 2 MMOL/L (ref 3–14)
ANION GAP SERPL CALCULATED.3IONS-SCNC: 5 MMOL/L (ref 3–14)
AST SERPL W P-5'-P-CCNC: 11 U/L (ref 0–45)
BILIRUB SERPL-MCNC: 0.4 MG/DL (ref 0.2–1.3)
BUN SERPL-MCNC: 12 MG/DL (ref 7–30)
BUN SERPL-MCNC: 12 MG/DL (ref 7–30)
CALCIUM SERPL-MCNC: 8.8 MG/DL (ref 8.5–10.1)
CALCIUM SERPL-MCNC: 9.1 MG/DL (ref 8.5–10.1)
CHLORIDE BLD-SCNC: 109 MMOL/L (ref 94–109)
CHLORIDE BLD-SCNC: 110 MMOL/L (ref 94–109)
CHOLEST SERPL-MCNC: 161 MG/DL
CO2 SERPL-SCNC: 23 MMOL/L (ref 20–32)
CO2 SERPL-SCNC: 26 MMOL/L (ref 20–32)
CREAT SERPL-MCNC: 0.61 MG/DL (ref 0.52–1.04)
CREAT SERPL-MCNC: 0.63 MG/DL (ref 0.52–1.04)
DEPRECATED CALCIDIOL+CALCIFEROL SERPL-MC: 39 UG/L (ref 20–75)
ERYTHROCYTE [DISTWIDTH] IN BLOOD BY AUTOMATED COUNT: 11.4 % (ref 10–15)
FASTING STATUS PATIENT QL REPORTED: NO
GFR SERPL CREATININE-BSD FRML MDRD: >90 ML/MIN/1.73M2
GFR SERPL CREATININE-BSD FRML MDRD: >90 ML/MIN/1.73M2
GLUCOSE BLD-MCNC: 93 MG/DL (ref 70–99)
GLUCOSE BLD-MCNC: 96 MG/DL (ref 70–99)
HCT VFR BLD AUTO: 41.6 % (ref 35–47)
HCV AB SERPL QL IA: NONREACTIVE
HDLC SERPL-MCNC: 56 MG/DL
HGB BLD-MCNC: 14 G/DL (ref 11.7–15.7)
LDLC SERPL CALC-MCNC: 94 MG/DL
MCH RBC QN AUTO: 32 PG (ref 26.5–33)
MCHC RBC AUTO-ENTMCNC: 33.7 G/DL (ref 31.5–36.5)
MCV RBC AUTO: 95 FL (ref 78–100)
NONHDLC SERPL-MCNC: 105 MG/DL
PLATELET # BLD AUTO: 309 10E3/UL (ref 150–450)
POTASSIUM BLD-SCNC: 4.1 MMOL/L (ref 3.4–5.3)
POTASSIUM BLD-SCNC: 4.2 MMOL/L (ref 3.4–5.3)
PROT SERPL-MCNC: 7.3 G/DL (ref 6.8–8.8)
RBC # BLD AUTO: 4.37 10E6/UL (ref 3.8–5.2)
SODIUM SERPL-SCNC: 137 MMOL/L (ref 133–144)
SODIUM SERPL-SCNC: 138 MMOL/L (ref 133–144)
TRIGL SERPL-MCNC: 53 MG/DL
TSH SERPL DL<=0.005 MIU/L-ACNC: 1.21 MU/L (ref 0.4–4)
VIT B12 SERPL-MCNC: 751 PG/ML (ref 193–986)
WBC # BLD AUTO: 4.6 10E3/UL (ref 4–11)

## 2022-02-09 PROCEDURE — 99396 PREV VISIT EST AGE 40-64: CPT | Performed by: INTERNAL MEDICINE

## 2022-02-09 PROCEDURE — 80053 COMPREHEN METABOLIC PANEL: CPT | Performed by: INTERNAL MEDICINE

## 2022-02-09 PROCEDURE — 82306 VITAMIN D 25 HYDROXY: CPT | Performed by: INTERNAL MEDICINE

## 2022-02-09 PROCEDURE — 84443 ASSAY THYROID STIM HORMONE: CPT | Performed by: INTERNAL MEDICINE

## 2022-02-09 PROCEDURE — 82607 VITAMIN B-12: CPT | Performed by: INTERNAL MEDICINE

## 2022-02-09 PROCEDURE — 86803 HEPATITIS C AB TEST: CPT | Performed by: INTERNAL MEDICINE

## 2022-02-09 PROCEDURE — 80061 LIPID PANEL: CPT | Performed by: INTERNAL MEDICINE

## 2022-02-09 PROCEDURE — 85027 COMPLETE CBC AUTOMATED: CPT | Performed by: INTERNAL MEDICINE

## 2022-02-09 PROCEDURE — G0145 SCR C/V CYTO,THINLAYER,RESCR: HCPCS | Performed by: INTERNAL MEDICINE

## 2022-02-09 PROCEDURE — 87624 HPV HI-RISK TYP POOLED RSLT: CPT | Performed by: INTERNAL MEDICINE

## 2022-02-09 PROCEDURE — 36415 COLL VENOUS BLD VENIPUNCTURE: CPT | Performed by: INTERNAL MEDICINE

## 2022-02-09 PROCEDURE — 99213 OFFICE O/P EST LOW 20 MIN: CPT | Mod: 25 | Performed by: INTERNAL MEDICINE

## 2022-02-09 ASSESSMENT — ENCOUNTER SYMPTOMS
JOINT SWELLING: 0
EYE PAIN: 0
HEADACHES: 0
FREQUENCY: 0
ABDOMINAL PAIN: 0
DIARRHEA: 0
DYSURIA: 0
CONSTIPATION: 0
CHILLS: 0
WEAKNESS: 0
FEVER: 0
NAUSEA: 0
MYALGIAS: 0
PARESTHESIAS: 0
BREAST MASS: 0
SORE THROAT: 0
COUGH: 0
NERVOUS/ANXIOUS: 0
ARTHRALGIAS: 0
HEMATURIA: 0
SHORTNESS OF BREATH: 0
HEMATOCHEZIA: 0
PALPITATIONS: 0
DIZZINESS: 0
HEARTBURN: 0

## 2022-02-09 ASSESSMENT — PATIENT HEALTH QUESTIONNAIRE - PHQ9: SUM OF ALL RESPONSES TO PHQ QUESTIONS 1-9: 10

## 2022-02-09 ASSESSMENT — MIFFLIN-ST. JEOR: SCORE: 1283.99

## 2022-02-09 NOTE — PROGRESS NOTES
SUBJECTIVE:   CC: Cecile Lim is an 41 year old woman who presents for preventive health visit.       Patient has been advised of split billing requirements and indicates understanding: Yes  Healthy Habits:     Getting at least 3 servings of Calcium per day:  Yes    Bi-annual eye exam:  NO    Dental care twice a year:  Yes    Sleep apnea or symptoms of sleep apnea:  Daytime drowsiness    Diet:  Regular (no restrictions)    Frequency of exercise:  None    Duration of exercise:  Less than 15 minutes    Taking medications regularly:  Yes    Barriers to taking medications:  None    Medication side effects:  None    PHQ-2 Total Score: 0    Additional concerns today:  Yes    Fatigue: blood work needed    Hemorrhoids:  Wonders what else to do; has tried various over the counter creams; wonders about removal; can be painful at times.          Today's PHQ-2 Score:   PHQ-2 ( 1999 [employer]) 2/9/2022   Q1: Little interest or pleasure in doing things 0   Q2: Feeling down, depressed or hopeless 0   PHQ-2 Score 0   PHQ-2 Total Score (12-17 Years)- Positive if 3 or more points; Administer PHQ-A if positive -   Q1: Little interest or pleasure in doing things Not at all   Q2: Feeling down, depressed or hopeless Not at all   PHQ-2 Score 0       Abuse: Current or Past (Physical, Sexual or Emotional) - No  Do you feel safe in your environment? Yes    Have you ever done Advance Care Planning? (For example, a Health Directive, POLST, or a discussion with a medical provider or your loved ones about your wishes): No, advance care planning information given to patient to review.  Patient plans to discuss their wishes with loved ones or provider.      Social History     Tobacco Use     Smoking status: Never Smoker     Smokeless tobacco: Never Used   Substance Use Topics     Alcohol use: Yes     Alcohol/week: 0.0 standard drinks     Comment: Rare. Maybe one drink a month     If you drink alcohol do you typically have >3 drinks per  day or >7 drinks per week? No    Alcohol Use 2/9/2022   Prescreen: >3 drinks/day or >7 drinks/week? No   Prescreen: >3 drinks/day or >7 drinks/week? -       Reviewed orders with patient.  Reviewed health maintenance and updated orders accordingly - Yes  Current Outpatient Medications   Medication Sig Dispense Refill     APPLE CIDER VINEGAR PO        lisinopril (ZESTRIL) 20 MG tablet Take 1 tablet (20 mg) by mouth daily 90 tablet 0     phentermine (ADIPEX-P) 30 MG capsule Take 1 capsule (30 mg) by mouth every morning 90 capsule 3     topiramate (TOPAMAX) 50 MG tablet Take 1 tablet (50 mg) by mouth daily 90 tablet 3     venlafaxine (EFFEXOR-XR) 37.5 MG 24 hr capsule 112.5 mg (take with 75 mg) once daily 90 capsule 3     venlafaxine (EFFEXOR-XR) 75 MG 24 hr capsule 112.5 mg (take with 37.5 mg) once daily 90 capsule 3     cetirizine (ZYRTEC) 10 MG tablet Take 1 tablet (10 mg) by mouth daily (Patient not taking: Reported on 6/10/2021) 90 tablet 1       Breast Cancer Screening:  Any new diagnosis of family breast, ovarian, or bowel cancer?no       FHS-7:   Breast CA Risk Assessment (FHS-7) 2/9/2022   Did any of your first-degree relatives have breast or ovarian cancer? No   Did any of your relatives have bilateral breast cancer? No   Did any man in your family have breast cancer? No   Did any woman in your family have breast and ovarian cancer? No   Did any woman in your family have breast cancer before age 50 y? No   Do you have 2 or more relatives with breast and/or ovarian cancer? No   Do you have 2 or more relatives with breast and/or bowel cancer? No       Mammogram Screening - Offered annual screening and updated Health Maintenance for mutual plan based on risk factor consideration    Pertinent mammograms are reviewed under the imaging tab.    History of abnormal Pap smear: NO - age 30-65 PAP every 5 years with negative HPV co-testing recommended  PAP / HPV Latest Ref Rng & Units 1/5/2017 1/17/2013 6/1/2011   PAP  "(Historical) - NIL NIL NIL   HPV16 NEG Negative - -   HPV18 NEG Negative - -   HRHPV NEG Negative - -     Reviewed and updated as needed this visit by clinical staff  Tobacco  Allergies  Meds   Med Hx  Surg Hx  Fam Hx  Soc Hx       Reviewed and updated as needed this visit by Provider                   Review of Systems   Constitutional: Negative for chills and fever.   HENT: Negative for congestion, ear pain, hearing loss and sore throat.    Eyes: Positive for visual disturbance. Negative for pain.   Respiratory: Negative for cough and shortness of breath.    Cardiovascular: Negative for chest pain, palpitations and peripheral edema.   Gastrointestinal: Negative for abdominal pain, constipation, diarrhea, heartburn, hematochezia and nausea.   Breasts:  Negative for tenderness, breast mass and discharge.   Genitourinary: Negative for dysuria, frequency, genital sores, hematuria, pelvic pain, urgency, vaginal bleeding and vaginal discharge.   Musculoskeletal: Negative for arthralgias, joint swelling and myalgias.   Skin: Negative for rash.   Neurological: Negative for dizziness, weakness, headaches and paresthesias.   Psychiatric/Behavioral: Negative for mood changes. The patient is not nervous/anxious.           OBJECTIVE:   /78 (BP Location: Right arm, Patient Position: Sitting, Cuff Size: Adult Large)   Pulse 73   Temp 98.6  F (37  C)   Resp 14   Ht 1.546 m (5' 0.85\")   Wt 69.4 kg (153 lb)   LMP 02/04/2022 (Approximate)   SpO2 100%   Breastfeeding No   BMI 29.05 kg/m    Physical Exam  GENERAL: healthy, alert and no distress  EYES: Eyes grossly normal to inspection, PERRL and conjunctivae and sclerae normal  HENT: ear canals and TM's normal, nose and mouth without ulcers or lesions  NECK: no adenopathy, no asymmetry, masses, or scars and thyroid normal to palpation  RESP: lungs clear to auscultation - no rales, rhonchi or wheezes  CV: regular rate and rhythm, normal S1 S2, no S3 or S4, no " murmur, click or rub, no peripheral edema and peripheral pulses strong  ABDOMEN: soft, nontender, no hepatosplenomegaly, no masses and bowel sounds normal   (female): normal female external genitalia, normal urethral meatus, vaginal mucosa pink, moist, well rugated, and normal cervix/adnexa/uterus without masses or discharge  RECTAL: external hemorrhoid  MS: no gross musculoskeletal defects noted, no edema  SKIN: no suspicious lesions or rashes  NEURO: Normal strength and tone, mentation intact and speech normal  PSYCH: mentation appears normal, affect normal/bright        ASSESSMENT/PLAN:     Problem List Items Addressed This Visit     Essential hypertension (Chronic)    Relevant Orders    Comprehensive metabolic panel (BMP + Alb, Alk Phos, ALT, AST, Total. Bili, TP) (Completed)    CARDIOVASCULAR SCREENING; LDL GOAL LESS THAN 160    Relevant Orders    Lipid panel reflex to direct LDL Non-fasting (Completed)      Other Visit Diagnoses     Routine general medical examination at a health care facility    -  Primary    Chronic fatigue        Relevant Orders    CBC with platelets (Completed)    TSH with free T4 reflex (Completed)    Vitamin B12    Vitamin D Deficiency    External hemorrhoids        Relevant Orders    Adult General Surg Referral    Screening for malignant neoplasm of cervix        Relevant Orders    Pap screen with HPV - recommended age 30 - 65 years    Screening for diabetes mellitus        Visit for screening mammogram        Relevant Orders    *MA Screening Digital Bilateral    Need for hepatitis C screening test        Relevant Orders    Hepatitis C Screen Reflex to HCV RNA Quant and Genotype          Patient has been advised of split billing requirements and indicates understanding: Yes    COUNSELING:  Reviewed preventive health counseling, as reflected in patient instructions    Estimated body mass index is 29.05 kg/m  as calculated from the following:    Height as of this encounter: 1.546 m (5'  "0.85\").    Weight as of this encounter: 69.4 kg (153 lb).    Weight management plan: Discussed healthy diet and exercise guidelines    She reports that she has never smoked. She has never used smokeless tobacco.      Counseling Resources:  ATP IV Guidelines  Pooled Cohorts Equation Calculator  Breast Cancer Risk Calculator  BRCA-Related Cancer Risk Assessment: FHS-7 Tool  FRAX Risk Assessment  ICSI Preventive Guidelines  Dietary Guidelines for Americans, 2010  USDA's MyPlate  ASA Prophylaxis  Lung CA Screening    Fawn Talley DO  [facility]  "

## 2022-02-09 NOTE — LETTER
February 15, 2022      Cecile Lim  [id number] [address] [id number]        Dear ,    We are writing to inform you of your test results.    Routine screening for hepatitis C is negative.  Vitamin D and B12 levels normal. Kidney function, blood sugar, electrolytes, thyroid, liver function, cholesterol, blood counts are normal.       Resulted Orders   Hepatitis C Screen Reflex to HCV RNA Quant and Genotype   Result Value Ref Range    Hepatitis C Antibody Nonreactive Nonreactive    Narrative    Assay performance characteristics have not been established for newborns, infants, and children.   Comprehensive metabolic panel (BMP + Alb, Alk Phos, ALT, AST, Total. Bili, TP)   Result Value Ref Range    Sodium 137 133 - 144 mmol/L    Potassium 4.1 3.4 - 5.3 mmol/L    Chloride 109 94 - 109 mmol/L    Carbon Dioxide (CO2) 26 20 - 32 mmol/L    Anion Gap 2 (L) 3 - 14 mmol/L    Urea Nitrogen 12 7 - 30 mg/dL    Creatinine 0.63 0.52 - 1.04 mg/dL    Calcium 8.8 8.5 - 10.1 mg/dL    Glucose 93 70 - 99 mg/dL    Alkaline Phosphatase 66 40 - 150 U/L    AST 11 0 - 45 U/L    ALT 22 0 - 50 U/L    Protein Total 7.3 6.8 - 8.8 g/dL    Albumin 3.8 3.4 - 5.0 g/dL    Bilirubin Total 0.4 0.2 - 1.3 mg/dL    GFR Estimate >90 >60 mL/min/1.73m2      Comment:      Effective December 21, 2021 eGFRcr in adults is calculated using the 2021 CKD-EPI creatinine equation which includes age and gender (Sienna key al., NEJ, DOI: 10.1056/FEEQdq8757844)   Lipid panel reflex to direct LDL Non-fasting   Result Value Ref Range    Cholesterol 161 <200 mg/dL    Triglycerides 53 <150 mg/dL    Direct Measure HDL 56 >=50 mg/dL    LDL Cholesterol Calculated 94 <=100 mg/dL    Non HDL Cholesterol 105 <130 mg/dL    Patient Fasting > 8hrs? No     Narrative    Cholesterol  Desirable:  <200 mg/dL    Triglycerides  Normal:  Less than 150 mg/dL  Borderline High:  150-199 mg/dL  High:  200-499 mg/dL  Very High:  Greater than or equal to 500 mg/dL    Direct  Measure HDL  Female:  Greater than or equal to 50 mg/dL   Male:  Greater than or equal to 40 mg/dL    LDL Cholesterol  Desirable:  <100mg/dL  Above Desirable:  100-129 mg/dL   Borderline High:  130-159 mg/dL   High:  160-189 mg/dL   Very High:  >= 190 mg/dL    Non HDL Cholesterol  Desirable:  130 mg/dL  Above Desirable:  130-159 mg/dL  Borderline High:  160-189 mg/dL  High:  190-219 mg/dL  Very High:  Greater than or equal to 220 mg/dL   CBC with platelets   Result Value Ref Range    WBC Count 4.6 4.0 - 11.0 10e3/uL    RBC Count 4.37 3.80 - 5.20 10e6/uL    Hemoglobin 14.0 11.7 - 15.7 g/dL    Hematocrit 41.6 35.0 - 47.0 %    MCV 95 78 - 100 fL    MCH 32.0 26.5 - 33.0 pg    MCHC 33.7 31.5 - 36.5 g/dL    RDW 11.4 10.0 - 15.0 %    Platelet Count 309 150 - 450 10e3/uL   TSH with free T4 reflex   Result Value Ref Range    TSH 1.21 0.40 - 4.00 mU/L   Vitamin B12   Result Value Ref Range    Vitamin B12 751 193 - 986 pg/mL   Vitamin D Deficiency   Result Value Ref Range    Vitamin D, Total (25-Hydroxy) 39 20 - 75 ug/L    Narrative    Season, race, dietary intake, and treatment affect the concentration of 25-hydroxy-Vitamin D. Values may decrease during winter months and increase during summer months. Values 20-29 ug/L may indicate Vitamin D insufficiency and values <20 ug/L may indicate Vitamin D deficiency.    Vitamin D determination is routinely performed by an immunoassay specific for 25 hydroxyvitamin D3.  If an individual is on vitamin D2(ergocalciferol) supplementation, please specify 25 OH vitamin D2 and D3 level determination by LCMSMS test [id number].         If you have any questions or concerns, please call the clinic at the number listed above.       Sincerely,      Fawn Talley, DO

## 2022-02-09 NOTE — PATIENT INSTRUCTIONS
Preventive Health Recommendations  Female Ages 40 to 49    Yearly exam:     See your health care provider every year in order to  1. Review health changes.   2. Discuss preventive care.    3. Review your medicines if your doctor prescribed any.      Get a Pap test every three years (unless you have an abnormal result and your provider advises testing more often).      If you get Pap tests with HPV test, you only need to test every 5 years, unless you have an abnormal result. You do not need a Pap test if your uterus was removed (hysterectomy) and you have not had cancer.      You should be tested each year for STDs (sexually transmitted diseases), if you're at risk.     Ask your doctor if you should have a mammogram.      Have a colonoscopy (test for colon cancer) if someone in your family has had colon cancer or polyps before age 50.       Have a cholesterol test every 5 years.       Have a diabetes test (fasting glucose) after age 45. If you are at risk for diabetes, you should have this test every 3 years.    Shots: Get a flu shot each year. Get a tetanus shot every 10 years.     Nutrition:     Eat at least 5 servings of fruits and vegetables each day.    Eat whole-grain bread, whole-wheat pasta and brown rice instead of white grains and rice.    Get adequate Calcium and Vitamin D.      Lifestyle    Exercise at least 150 minutes a week (an average of 30 minutes a day, 5 days a week). This will help you control your weight and prevent disease.    Limit alcohol to one drink per day.    No smoking.     Wear sunscreen to prevent skin cancer.    See your dentist every six months for an exam and cleaning.      1. Blood work today  2. Follow-up with Urology for bladder  3. Follow-up with General Surgeon for hemorrhoids  4. Pap done today  5. You are due for a mammogram.  Please call [phone] to schedule.

## 2022-02-10 NOTE — RESULT ENCOUNTER NOTE
Routine screening for hepatitis C is negative.  Vitamin D and B12 levels normal. Kidney function, blood sugar, electrolytes, thyroid, liver function, cholesterol, blood counts are normal.

## 2022-02-11 LAB
BKR LAB AP GYN ADEQUACY: NORMAL
BKR LAB AP GYN INTERPRETATION: NORMAL
BKR LAB AP HPV REFLEX: NORMAL
BKR LAB AP PREVIOUS ABNORMAL: NORMAL
PATH REPORT.COMMENTS IMP SPEC: NORMAL
PATH REPORT.COMMENTS IMP SPEC: NORMAL
PATH REPORT.RELEVANT HX SPEC: NORMAL

## 2022-02-15 LAB
HUMAN PAPILLOMA VIRUS 16 DNA: NEGATIVE
HUMAN PAPILLOMA VIRUS 18 DNA: NEGATIVE
HUMAN PAPILLOMA VIRUS FINAL DIAGNOSIS: NORMAL
HUMAN PAPILLOMA VIRUS OTHER HR: NEGATIVE

## 2022-06-17 ENCOUNTER — HOSPITAL ENCOUNTER (EMERGENCY)
Facility: CLINIC | Age: 42
Discharge: HOME | End: 2022-06-17
Attending: PHYSICIAN ASSISTANT | Admitting: PHYSICIAN ASSISTANT
Payer: COMMERCIAL

## 2022-06-17 VITALS
BODY MASS INDEX: 29.45 KG/M2 | OXYGEN SATURATION: 98 % | HEART RATE: 76 BPM | SYSTOLIC BLOOD PRESSURE: 143 MMHG | DIASTOLIC BLOOD PRESSURE: 85 MMHG | RESPIRATION RATE: 18 BRPM | TEMPERATURE: 98.3 F | WEIGHT: 156 LBS | HEIGHT: 61 IN

## 2022-06-17 DIAGNOSIS — R30.0 DYSURIA: ICD-10-CM

## 2022-06-17 LAB
ALBUMIN UR-MCNC: ABNORMAL MG/DL
APPEARANCE UR: ABNORMAL
BACTERIA #/AREA URNS HPF: ABNORMAL /HPF
BILIRUB UR QL STRIP: NEGATIVE
COLOR UR AUTO: YELLOW
GLUCOSE UR STRIP-MCNC: NEGATIVE MG/DL
HGB UR QL STRIP: ABNORMAL
KETONES UR STRIP-MCNC: 40 MG/DL
LEUKOCYTE ESTERASE UR QL STRIP: ABNORMAL
MUCOUS THREADS #/AREA URNS LPF: PRESENT /LPF
NITRATE UR QL: NEGATIVE
PH UR STRIP: 6 [PH] (ref 5–7)
RBC URINE: >182 /HPF
SP GR UR STRIP: 1.03 (ref 1–1.03)
SQUAMOUS EPITHELIAL: 6 /HPF
UROBILINOGEN UR STRIP-MCNC: NORMAL MG/DL
WBC URINE: 0 /HPF

## 2022-06-17 PROCEDURE — G0463 HOSPITAL OUTPT CLINIC VISIT: HCPCS | Performed by: PHYSICIAN ASSISTANT

## 2022-06-17 PROCEDURE — 81003 URINALYSIS AUTO W/O SCOPE: CPT | Performed by: PHYSICIAN ASSISTANT

## 2022-06-17 PROCEDURE — 87086 URINE CULTURE/COLONY COUNT: CPT | Performed by: PHYSICIAN ASSISTANT

## 2022-06-17 PROCEDURE — 99213 OFFICE O/P EST LOW 20 MIN: CPT | Performed by: PHYSICIAN ASSISTANT

## 2022-06-17 RX ORDER — SULFAMETHOXAZOLE/TRIMETHOPRIM 800-160 MG
1 TABLET ORAL 2 TIMES DAILY
Qty: 6 TABLET | Refills: 0 | Status: SHIPPED | OUTPATIENT
Start: 2022-06-17 | End: 2022-06-20

## 2022-06-18 NOTE — ED PROVIDER NOTES
History     Chief Complaint   Patient presents with     UTI     HPI  Cecile Lim is a 41 year old female who presents the urgent care with concern over possible urinary tract infection.  Beginning earlier today patient developed urinary frequency, urgency, voiding in small amounts, suprapubic pressure.  She is unsure if she has significant hematuria as she is currently menstruating.  She denies any fever, chills, nausea, cough, dyspnea, wheezing, flank pain.  She has had a history of her prior distant urinary tract infections previously and states symptoms feel similar.    Allergies:  No Known Allergies    Problem List:    Patient Active Problem List    Diagnosis Date Noted     Encounter for surveillance of contraceptives 03/10/2016     Priority: Medium      has had vasectomy       Lipoma of back 11/12/2015     Priority: Medium     Major depressive disorder, recurrent episode, in partial remission (HCC) 12/23/2014     Priority: Medium     Essential hypertension 09/18/2012     Priority: Medium     Stress incontinence, female 02/27/2012     Priority: Medium     CARDIOVASCULAR SCREENING; LDL GOAL LESS THAN 160 10/31/2010     Priority: Medium     Generalized anxiety disorder 10/20/2010     Priority: Medium     Ureteral stone 07/09/2009     Priority: Medium      Past Medical History:    Past Medical History:   Diagnosis Date     LAURA (generalised anxiety disorder)      HTN (hypertension)      Suicide attempt (H) 11/2014     Past Surgical History:    Past Surgical History:   Procedure Laterality Date     EXCISE MASS TRUNK N/A 11/29/2018    Procedure: Excision of Left Back Mass;  Surgeon: Rolando Claros DO;  Location: [facility]     EXCISE MASS TRUNK N/A 12/7/2018    Procedure: Evacuation of back hematoma with placement of drain;  Surgeon: Rolando Claros DO;  Location: [facility] OR     NO HISTORY OF SURGERY  1/2007     SLING TRANSVAGINAL N/A 4/27/2018    Procedure: SLING TRANSVAGINAL;  Pubovaginal  "sling ([place]);  Surgeon: Douglas Hopkisn MD;  Location: [facility] OR     SOFT TISSUE SURGERY       Family History:    Family History   Problem Relation Age of Onset     Hypertension Mother      Musculoskeletal Disorder Mother         degenerative disc disease     Thyroid Disease Mother      Depression Mother      Cerebrovascular Disease Mother      Heart Disease Mother      Hypertension Father      Cerebrovascular Disease Father         age 40     Hypertension Maternal Grandfather      Cardiovascular Maternal Grandfather      Cancer Paternal Grandfather         lung     Hypertension Brother      Bartter's syndrome Brother      Asthma No family hx of      C.A.D. No family hx of      Diabetes No family hx of      Breast Cancer No family hx of      Cancer - colorectal No family hx of      Prostate Cancer No family hx of        Social History:  Marital Status:   [2]  Social History     Tobacco Use     Smoking status: Never Smoker     Smokeless tobacco: Never Used   Vaping Use     Vaping Use: Never used   Substance Use Topics     Alcohol use: Yes     Alcohol/week: 0.0 standard drinks     Comment: Rare. Maybe one drink a month     Drug use: No        Medications:    APPLE CIDER VINEGAR PO  cetirizine (ZYRTEC) 10 MG tablet  lisinopril (ZESTRIL) 20 MG tablet  phentermine (ADIPEX-P) 30 MG capsule  topiramate (TOPAMAX) 50 MG tablet  venlafaxine (EFFEXOR-XR) 37.5 MG 24 hr capsule  venlafaxine (EFFEXOR-XR) 75 MG 24 hr capsule      Review of Systems  CONSTITUTIONAL:NEGATIVE for fever, chills, change in weight  INTEGUMENTARY/SKIN: NEGATIVE for worrisome rashes, moles or lesions  RESP:NEGATIVE for significant cough or SOB  GI: POSITIVE for suprapubic pressure NEGATIVE for nausea, vomiting, diarrhea   : POSITIVE for urinary frequency, urgency, voiding in small amounts  Physical Exam   BP: (!) 143/85  Pulse: 76  Temp: 98.3  F (36.8  C)  Resp: 18  Height: 154.9 cm (5' 1\")  Weight: 70.8 kg (156 lb)  SpO2: 98 %  Physical " Exam  GENERAL APPEARANCE: healthy, alert and no distress  RESP: lungs clear to auscultation - no rales, rhonchi or wheezes  CV: regular rates and rhythm, normal S1 S2, no murmur noted  ABDOMEN:  soft, nontender, no HSM or masses and bowel sounds normal  BACK: No CVA tenderness  SKIN: no suspicious lesions or rashes  ED Course             Procedures       Critical Care time:  none        Results for orders placed or performed during the hospital encounter of 06/17/22 (from the past 24 hour(s))   UA with Microscopic reflex to Culture    Specimen: Urine, Clean Catch   Result Value Ref Range    Color Urine Yellow Colorless, Straw, Light Yellow, Yellow    Appearance Urine Cloudy (A) Clear    Glucose Urine Negative Negative mg/dL    Bilirubin Urine Negative Negative    Ketones Urine 40  (A) Negative mg/dL    Specific Gravity Urine 1.030 1.003 - 1.035    Blood Urine Large (A) Negative    pH Urine 6.0 5.0 - 7.0    Protein Albumin Urine Trace (A) Negative mg/dL    Urobilinogen Urine Normal Normal, 2.0 mg/dL    Nitrite Urine Negative Negative    Leukocyte Esterase Urine Trace (A) Negative    Bacteria Urine Few (A) None Seen /HPF    Mucus Urine Present (A) None Seen /LPF    RBC Urine >182 (H) <=2 /HPF    WBC Urine 0 <=5 /HPF    Squamous Epithelials Urine 6 (H) <=1 /HPF    Narrative    Urine Culture not indicated     Medications - No data to display    Assessments & Plan (with Medical Decision Making)     I have reviewed the nursing notes.  I have reviewed the findings, diagnosis, plan and need for follow up with the patient.       Discharge Medication List as of 6/17/2022  7:51 PM      START taking these medications    Details   sulfamethoxazole-trimethoprim (BACTRIM DS) 800-160 MG tablet Take 1 tablet by mouth 2 times daily for 3 days, Disp-6 tablet, R-0, E-Prescribe           Final diagnoses:   Dysuria     41-year-old female presents to urgent care with concern for possible urinary tract infection given urinary frequency,  urgency, voiding in small amounts and suprapubic pressure which developed earlier today.  She had benign physical exam findings.  Urinalysis was significant for a large amount of blood, trace protein, trace leukocyte esterase and few bacteria however was contaminated by 6 squamous epithelial cells.  I suspect blood is contamination from menstrual bleeding.  I have a low suspicion for cystitis, pyelonephritis however did discuss her/benefits of empiric antibiotics given patient's complaints and she did elect to initiate prescription for Bactrim.  We did discuss possibility of nephrolithiasis also presenting with irritative urinary complaints and hematuria however as patient is not having any flank pain she agreed to defer imaging.  She was discharged home stable with prescription for bactrim with urine culture from today's visit pending.  Follow up with PCP if no improvement in 3-4 days.  worrisome reasons to return to ER/UC sooner discussed.     Disclaimer: This note consists of symbols derived from keyboarding, dictation, and/or voice recognition software. As a result, there may be errors in the script that have gone undetected.  Please consider this when interpreting information found in the chart.    6/17/2022   [facility] EMERGENCY DEPT     Jenae Dyer PA-C  06/19/22 1948

## 2022-06-19 LAB — BACTERIA UR CULT: NORMAL

## 2022-08-22 DIAGNOSIS — F41.1 GENERALIZED ANXIETY DISORDER: Chronic | ICD-10-CM

## 2022-08-22 DIAGNOSIS — F33.1 MAJOR DEPRESSIVE DISORDER, RECURRENT EPISODE, MODERATE (H): ICD-10-CM

## 2022-08-23 NOTE — TELEPHONE ENCOUNTER
"Requested Prescriptions   Pending Prescriptions Disp Refills     venlafaxine (EFFEXOR XR) 37.5 MG 24 hr capsule [Pharmacy Med Name: venlafaxine ER 37.5 mg capsule,extended release 24 hr] 90 capsule 3     Sig: TAKE 1 CAPSULE BY MOUTH EVERY DAY       Serotonin-Norepinephrine Reuptake Inhibitors  Failed - 8/22/2022  8:01 AM        Failed - Blood pressure under 140/90 in past 12 months     BP Readings from Last 3 Encounters:   06/17/22 (!) 143/85   02/09/22 110/78   06/12/21 132/89                 Failed - PHQ-9 score of less than 5 in past 6 months     Please review last PHQ-9 score.           Failed - Recent (6 mo) or future (30 days) visit within the authorizing provider's specialty     Patient had office visit in the last 6 months or has a visit in the next 30 days with authorizing provider or within the authorizing provider's specialty.  See \"Patient Info\" tab in inbasket, or \"Choose Columns\" in Meds & Orders section of the refill encounter.            Passed - Medication is active on med list        Passed - Patient is age 18 or older        Passed - No active pregnancy on record        Passed - Normal serum creatinine on file in past 12 months     Recent Labs   Lab Test 02/09/22  1009   CR 0.61  0.63       Ok to refill medication if creatinine is low          Passed - No positive pregnancy test in past 12 months           venlafaxine (EFFEXOR XR) 75 MG 24 hr capsule [Pharmacy Med Name: venlafaxine ER 75 mg capsule,extended release 24 hr] 90 capsule 3     Sig: TAKE 1 CAPSULE BY MOUTH EVERY DAY       Serotonin-Norepinephrine Reuptake Inhibitors  Failed - 8/22/2022  8:01 AM        Failed - Blood pressure under 140/90 in past 12 months     BP Readings from Last 3 Encounters:   06/17/22 (!) 143/85   02/09/22 110/78   06/12/21 132/89                 Failed - PHQ-9 score of less than 5 in past 6 months     Please review last PHQ-9 score.           Failed - Recent (6 mo) or future (30 days) visit within the authorizing " "provider's specialty     Patient had office visit in the last 6 months or has a visit in the next 30 days with authorizing provider or within the authorizing provider's specialty.  See \"Patient Info\" tab in inbasket, or \"Choose Columns\" in Meds & Orders section of the refill encounter.            Passed - Medication is active on med list        Passed - Patient is age 18 or older        Passed - No active pregnancy on record        Passed - Normal serum creatinine on file in past 12 months     Recent Labs   Lab Test 02/09/22  1009   CR 0.61  0.63       Ok to refill medication if creatinine is low          Passed - No positive pregnancy test in past 12 months             "

## 2022-08-25 RX ORDER — VENLAFAXINE HYDROCHLORIDE 37.5 MG/1
CAPSULE, EXTENDED RELEASE ORAL
Qty: 90 CAPSULE | Refills: 0 | Status: SHIPPED | OUTPATIENT
Start: 2022-08-25 | End: 2022-12-20

## 2022-08-25 RX ORDER — VENLAFAXINE HYDROCHLORIDE 75 MG/1
CAPSULE, EXTENDED RELEASE ORAL
Qty: 90 CAPSULE | Refills: 0 | Status: SHIPPED | OUTPATIENT
Start: 2022-08-25 | End: 2022-12-20

## 2022-09-08 ENCOUNTER — MYC MEDICAL ADVICE (OUTPATIENT)
Dept: FAMILY MEDICINE | Facility: CLINIC | Age: 42
End: 2022-09-08

## 2022-09-13 RX ORDER — PHENTERMINE HYDROCHLORIDE 30 MG/1
30 CAPSULE ORAL EVERY MORNING
Qty: 90 CAPSULE | Refills: 3 | Status: SHIPPED | OUTPATIENT
Start: 2022-09-13 | End: 2023-03-13

## 2022-11-09 NOTE — NURSING NOTE
"Initial /82 (BP Location: Right arm, Patient Position: Sitting, Cuff Size: Adult Regular)   Pulse 82   Temp 98.5  F (36.9  C) (Tympanic)   Ht 1.549 m (5' 1\")   Wt 71.7 kg (158 lb)   LMP 12/07/2018   BMI 29.85 kg/m   Estimated body mass index is 29.85 kg/m  as calculated from the following:    Height as of this encounter: 1.549 m (5' 1\").    Weight as of this encounter: 71.7 kg (158 lb). .    Britt Hallman MA    " Mohs Histo Method Verbiage: Each section was then chromacoded and processed in the Mohs lab using the Mohs protocol and submitted for frozen section.

## 2022-11-19 ENCOUNTER — HEALTH MAINTENANCE LETTER (OUTPATIENT)
Age: 42
End: 2022-11-19

## 2022-12-19 DIAGNOSIS — F41.1 GENERALIZED ANXIETY DISORDER: Chronic | ICD-10-CM

## 2022-12-19 DIAGNOSIS — F33.1 MAJOR DEPRESSIVE DISORDER, RECURRENT EPISODE, MODERATE (H): ICD-10-CM

## 2022-12-20 RX ORDER — VENLAFAXINE HYDROCHLORIDE 75 MG/1
CAPSULE, EXTENDED RELEASE ORAL
Qty: 90 CAPSULE | Refills: 0 | Status: SHIPPED | OUTPATIENT
Start: 2022-12-20 | End: 2022-12-30

## 2022-12-20 RX ORDER — VENLAFAXINE HYDROCHLORIDE 37.5 MG/1
CAPSULE, EXTENDED RELEASE ORAL
Qty: 90 CAPSULE | Refills: 0 | Status: SHIPPED | OUTPATIENT
Start: 2022-12-20 | End: 2022-12-30

## 2022-12-20 NOTE — TELEPHONE ENCOUNTER
"Requested Prescriptions   Pending Prescriptions Disp Refills    venlafaxine (EFFEXOR XR) 37.5 MG 24 hr capsule [Pharmacy Med Name: venlafaxine ER 37.5 mg capsule,extended release 24 hr] 90 capsule 0     Sig: TAKE 1 CAPSULE BY MOUTH EVERY DAY       Serotonin-Norepinephrine Reuptake Inhibitors  Failed - 12/19/2022  8:19 AM        Failed - Blood pressure under 140/90 in past 12 months     BP Readings from Last 3 Encounters:   06/17/22 (!) 143/85   02/09/22 110/78   06/12/21 132/89                 Failed - PHQ-9 score of less than 5 in past 6 months     Please review last PHQ-9 score.           Passed - Medication is active on med list        Passed - Patient is age 18 or older        Passed - No active pregnancy on record        Passed - Normal serum creatinine on file in past 12 months     Recent Labs   Lab Test 02/09/22  1009   CR 0.61  0.63       Ok to refill medication if creatinine is low          Passed - No positive pregnancy test in past 12 months        Passed - Recent (6 mo) or future (30 days) visit within the authorizing provider's specialty     Patient had office visit in the last 6 months or has a visit in the next 30 days with authorizing provider or within the authorizing provider's specialty.  See \"Patient Info\" tab in inbasket, or \"Choose Columns\" in Meds & Orders section of the refill encounter.              venlafaxine (EFFEXOR XR) 75 MG 24 hr capsule [Pharmacy Med Name: venlafaxine ER 75 mg capsule,extended release 24 hr] 90 capsule 0     Sig: TAKE 1 CAPSULE BY MOUTH EVERY DAY, due for office visit with PCP       Serotonin-Norepinephrine Reuptake Inhibitors  Failed - 12/19/2022  8:19 AM        Failed - Blood pressure under 140/90 in past 12 months     BP Readings from Last 3 Encounters:   06/17/22 (!) 143/85   02/09/22 110/78   06/12/21 132/89                 Failed - PHQ-9 score of less than 5 in past 6 months     Please review last PHQ-9 score.           Passed - Medication is active on med list " "       Passed - Patient is age 18 or older        Passed - No active pregnancy on record        Passed - Normal serum creatinine on file in past 12 months     Recent Labs   Lab Test 02/09/22  1009   CR 0.61  0.63       Ok to refill medication if creatinine is low          Passed - No positive pregnancy test in past 12 months        Passed - Recent (6 mo) or future (30 days) visit within the authorizing provider's specialty     Patient had office visit in the last 6 months or has a visit in the next 30 days with authorizing provider or within the authorizing provider's specialty.  See \"Patient Info\" tab in inbasket, or \"Choose Columns\" in Meds & Orders section of the refill encounter.                 "

## 2022-12-30 ENCOUNTER — VIRTUAL VISIT (OUTPATIENT)
Dept: FAMILY MEDICINE | Facility: CLINIC | Age: 42
End: 2022-12-30
Payer: COMMERCIAL

## 2022-12-30 DIAGNOSIS — I10 ESSENTIAL HYPERTENSION: Chronic | ICD-10-CM

## 2022-12-30 DIAGNOSIS — F33.41 MAJOR DEPRESSIVE DISORDER, RECURRENT EPISODE, IN PARTIAL REMISSION (H): Primary | ICD-10-CM

## 2022-12-30 DIAGNOSIS — F33.1 MAJOR DEPRESSIVE DISORDER, RECURRENT EPISODE, MODERATE (H): ICD-10-CM

## 2022-12-30 DIAGNOSIS — F41.1 GENERALIZED ANXIETY DISORDER: Chronic | ICD-10-CM

## 2022-12-30 PROCEDURE — 99214 OFFICE O/P EST MOD 30 MIN: CPT | Mod: 95 | Performed by: INTERNAL MEDICINE

## 2022-12-30 RX ORDER — TOPIRAMATE 25 MG/1
25 TABLET, FILM COATED ORAL DAILY
Qty: 90 TABLET | Refills: 3 | Status: SHIPPED | OUTPATIENT
Start: 2022-12-30 | End: 2024-01-02

## 2022-12-30 RX ORDER — VENLAFAXINE HYDROCHLORIDE 37.5 MG/1
37.5 CAPSULE, EXTENDED RELEASE ORAL DAILY
Qty: 90 CAPSULE | Refills: 3 | Status: SHIPPED | OUTPATIENT
Start: 2022-12-30 | End: 2023-03-31 | Stop reason: DRUGHIGH

## 2022-12-30 RX ORDER — VENLAFAXINE HYDROCHLORIDE 75 MG/1
75 CAPSULE, EXTENDED RELEASE ORAL DAILY
Qty: 90 CAPSULE | Refills: 3 | Status: SHIPPED | OUTPATIENT
Start: 2022-12-30 | End: 2023-03-31

## 2022-12-30 ASSESSMENT — PATIENT HEALTH QUESTIONNAIRE - PHQ9
SUM OF ALL RESPONSES TO PHQ QUESTIONS 1-9: 13
5. POOR APPETITE OR OVEREATING: MORE THAN HALF THE DAYS

## 2022-12-30 ASSESSMENT — ANXIETY QUESTIONNAIRES
GAD7 TOTAL SCORE: 17
7. FEELING AFRAID AS IF SOMETHING AWFUL MIGHT HAPPEN: MORE THAN HALF THE DAYS
GAD7 TOTAL SCORE: 17
3. WORRYING TOO MUCH ABOUT DIFFERENT THINGS: NEARLY EVERY DAY
1. FEELING NERVOUS, ANXIOUS, OR ON EDGE: NEARLY EVERY DAY
2. NOT BEING ABLE TO STOP OR CONTROL WORRYING: SEVERAL DAYS
5. BEING SO RESTLESS THAT IT IS HARD TO SIT STILL: NEARLY EVERY DAY
IF YOU CHECKED OFF ANY PROBLEMS ON THIS QUESTIONNAIRE, HOW DIFFICULT HAVE THESE PROBLEMS MADE IT FOR YOU TO DO YOUR WORK, TAKE CARE OF THINGS AT HOME, OR GET ALONG WITH OTHER PEOPLE: SOMEWHAT DIFFICULT
6. BECOMING EASILY ANNOYED OR IRRITABLE: NEARLY EVERY DAY

## 2022-12-30 NOTE — PROGRESS NOTES
"Cecile is a 42 year old who is being evaluated via a billable video visit.      How would you like to obtain your AVS? MyChart  If the video visit is dropped, the invitation should be resent by: Text to cell phone: [phone]  Will anyone else be joining your video visit? No          Assessment & Plan   Problem List Items Addressed This Visit     Essential hypertension (Chronic)    Generalized anxiety disorder (Chronic)    Relevant Medications    topiramate (TOPAMAX) 25 MG tablet    venlafaxine (EFFEXOR XR) 37.5 MG 24 hr capsule    venlafaxine (EFFEXOR XR) 75 MG 24 hr capsule    Major depressive disorder, recurrent episode, in partial remission (HCC) - Primary    Relevant Medications    venlafaxine (EFFEXOR XR) 37.5 MG 24 hr capsule    venlafaxine (EFFEXOR XR) 75 MG 24 hr capsule   Other Visit Diagnoses     BMI 29.0-29.9,adult        BMI 32.0-32.9,adult        Relevant Medications    topiramate (TOPAMAX) 25 MG tablet    Major depressive disorder, recurrent episode, moderate (H)        Relevant Medications    venlafaxine (EFFEXOR XR) 37.5 MG 24 hr capsule    venlafaxine (EFFEXOR XR) 75 MG 24 hr capsule                    BMI:   Estimated body mass index is 29.48 kg/m  as calculated from the following:    Height as of 6/17/22: 1.549 m (5' 1\").    Weight as of 6/17/22: 70.8 kg (156 lb).       Patient Instructions   Mental Health  1. Recommend to see counselor and work on self care; no increase to meds at this time since increase in anxiety is situational   2. Atomic Habits by Santi Sewell  3. Refill of venlafaxine x 1 year    Weight  1. Look into [employer] insurance coverage   2. Order for topiramate at 25 mg due to intolerance to higher dose      No follow-ups on file.    Fawn Talley,   [facility]    Subjective   Cecile is a 42 year old, presenting for the following health issues:  Recheck Medication      HPI     Hypertension Follow-up      Do you check your blood pressure regularly " outside of the clinic? Yes     Are you following a low salt diet? No    Are your blood pressures ever more than 140 on the top number (systolic) OR more   than 90 on the bottom number (diastolic), for example 140/90? No    Depression and Anxiety Follow-Up    How are you doing with your depression since your last visit? Worsened     How are you doing with your anxiety since your last visit?  Worsened     Are you having other symptoms that might be associated with depression or anxiety? No    Have you had a significant life event? OTHER: family health concerns     Do you have any concerns with your use of alcohol or other drugs? No     Feels anxiety is worse - holidays, family stressors    Has not seen counselor in several years- found it helpful int he past    Increase in chronic fatigue    Social History     Tobacco Use     Smoking status: Never     Smokeless tobacco: Never   Vaping Use     Vaping Use: Never used   Substance Use Topics     Alcohol use: Yes     Alcohol/week: 0.0 standard drinks     Comment: Rare. Maybe one drink a month     Drug use: No     PHQ 1/14/2021 2/9/2022 12/30/2022   PHQ-9 Total Score 4 10 13   Q9: Thoughts of better off dead/self-harm past 2 weeks Not at all Not at all Not at all     LAURA-7 SCORE 10/16/2020 1/14/2021 12/30/2022   Total Score - - -   Total Score - - -   Total Score 2 3 17         Suicide Assessment Five-step Evaluation and Treatment (SAFE-T)      How many servings of fruits and vegetables do you eat daily?  2-3    On average, how many sweetened beverages do you drink each day (Examples: soda, juice, sweet tea, etc.  Do NOT count diet or artificially sweetened beverages)?   3    How many days per week do you exercise enough to make your heart beat faster? 3 or less    How many minutes a day do you exercise enough to make your heart beat faster? 9 or less    How many days per week do you miss taking your medication? 0    Medication Followup of phentermine 30 mg    Taking  Medication as prescribed: yes    Side Effects:  None    Medication Helping Symptoms:  yes      Medication Followup of topiramate 50 mg    Taking Medication as prescribed: NO-taking half tablet due to side effect of being forgetful    Side Effects:  None if taking half dose    Medication Helping Symptoms:  Yes    Wt is 155        Current Outpatient Medications   Medication Sig Dispense Refill     APPLE CIDER VINEGAR PO        lisinopril (ZESTRIL) 20 MG tablet Take 1 tablet (20 mg) by mouth daily 90 tablet 2     phentermine (ADIPEX-P) 30 MG capsule Take 1 capsule (30 mg) by mouth every morning 90 capsule 3     topiramate (TOPAMAX) 50 MG tablet Take 1 tablet (50 mg) by mouth daily 90 tablet 3     venlafaxine (EFFEXOR XR) 37.5 MG 24 hr capsule TAKE 1 CAPSULE BY MOUTH EVERY DAY 90 capsule 0     venlafaxine (EFFEXOR XR) 75 MG 24 hr capsule TAKE 1 CAPSULE BY MOUTH EVERY DAY, due for office visit with PCP 90 capsule 0     cetirizine (ZYRTEC) 10 MG tablet Take 1 tablet (10 mg) by mouth daily (Patient not taking: Reported on 6/10/2021) 90 tablet 1         Review of Systems   Constitutional, HEENT, cardiovascular, pulmonary, gi and gu systems are negative, except as otherwise noted.      Objective           Vitals:  No vitals were obtained today due to virtual visit.    Physical Exam   GENERAL: Healthy, alert and no distress  EYES: Eyes grossly normal to inspection.  No discharge or erythema, or obvious scleral/conjunctival abnormalities.  RESP: No audible wheeze, cough, or visible cyanosis.  No visible retractions or increased work of breathing.    SKIN: Visible skin clear. No significant rash, abnormal pigmentation or lesions.  NEURO: Cranial nerves grossly intact.  Mentation and speech appropriate for age.  PSYCH: Mentation appears normal, affect normal/bright, judgement and insight intact, normal speech and appearance well-groomed.                Video-Visit Details    Type of service:  Video Visit     Originating Location  (pt. Location): Home    Distant Location (provider location):  On-site  Platform used for Video Visit: Marlyn

## 2022-12-30 NOTE — PATIENT INSTRUCTIONS
Mental Health  Recommend to see counselor and work on self care; no increase to meds at this time since increase in anxiety is situational   Atomic Habits by Santi Clear  Refill of venlafaxine x 1 year    Weight  Look into [employer] insurance coverage   Order for topiramate at 25 mg due to intolerance to higher dose

## 2023-01-09 ENCOUNTER — ANCILLARY PROCEDURE (OUTPATIENT)
Dept: MAMMOGRAPHY | Facility: CLINIC | Age: 43
End: 2023-01-09
Attending: INTERNAL MEDICINE
Payer: COMMERCIAL

## 2023-01-09 DIAGNOSIS — Z12.31 VISIT FOR SCREENING MAMMOGRAM: ICD-10-CM

## 2023-01-09 PROCEDURE — 77067 SCR MAMMO BI INCL CAD: CPT | Mod: TC | Performed by: RADIOLOGY

## 2023-01-09 PROCEDURE — 77063 BREAST TOMOSYNTHESIS BI: CPT | Mod: TC | Performed by: RADIOLOGY

## 2023-03-10 DIAGNOSIS — I10 ESSENTIAL HYPERTENSION: ICD-10-CM

## 2023-03-10 NOTE — TELEPHONE ENCOUNTER
"Requested Prescriptions   Pending Prescriptions Disp Refills     lisinopril (ZESTRIL) 20 MG tablet [Pharmacy Med Name: lisinopril 20 mg tablet] 90 tablet 2     Sig: Take 1 tablet (20 mg) by mouth daily       ACE Inhibitors (Including Combos) Protocol Failed - 3/10/2023  8:00 AM        Failed - Blood pressure under 140/90 in past 12 months     BP Readings from Last 3 Encounters:   06/17/22 (!) 143/85   02/09/22 110/78   06/12/21 132/89                 Failed - Normal serum creatinine on file in past 12 months     Recent Labs   Lab Test 02/09/22  1009   CR 0.61  0.63       Ok to refill medication if creatinine is low          Failed - Normal serum potassium on file in past 12 months     Recent Labs   Lab Test 02/09/22  1009   POTASSIUM 4.2  4.1             Passed - Recent (12 mo) or future (30 days) visit within the authorizing provider's specialty     Patient has had an office visit with the authorizing provider or a provider within the authorizing providers department within the previous 12 mos or has a future within next 30 days. See \"Patient Info\" tab in inbasket, or \"Choose Columns\" in Meds & Orders section of the refill encounter.              Passed - Medication is active on med list        Passed - Patient is age 18 or older        Passed - No active pregnancy on record        Passed - No positive pregnancy test within past 12 months             "

## 2023-03-13 RX ORDER — LISINOPRIL 20 MG/1
20 TABLET ORAL DAILY
Qty: 90 TABLET | Refills: 0 | Status: SHIPPED | OUTPATIENT
Start: 2023-03-13 | End: 2023-03-31

## 2023-03-31 ENCOUNTER — OFFICE VISIT (OUTPATIENT)
Dept: FAMILY MEDICINE | Facility: CLINIC | Age: 43
End: 2023-03-31
Payer: COMMERCIAL

## 2023-03-31 VITALS
HEIGHT: 61 IN | OXYGEN SATURATION: 98 % | TEMPERATURE: 99 F | SYSTOLIC BLOOD PRESSURE: 122 MMHG | DIASTOLIC BLOOD PRESSURE: 88 MMHG | WEIGHT: 146.9 LBS | BODY MASS INDEX: 27.73 KG/M2 | HEART RATE: 80 BPM | RESPIRATION RATE: 16 BRPM

## 2023-03-31 DIAGNOSIS — I10 ESSENTIAL HYPERTENSION: ICD-10-CM

## 2023-03-31 DIAGNOSIS — F41.1 GENERALIZED ANXIETY DISORDER: Chronic | ICD-10-CM

## 2023-03-31 DIAGNOSIS — Z00.00 ROUTINE GENERAL MEDICAL EXAMINATION AT A HEALTH CARE FACILITY: Primary | ICD-10-CM

## 2023-03-31 DIAGNOSIS — F33.1 MAJOR DEPRESSIVE DISORDER, RECURRENT EPISODE, MODERATE (H): ICD-10-CM

## 2023-03-31 DIAGNOSIS — E66.3 OVERWEIGHT (BMI 25.0-29.9): ICD-10-CM

## 2023-03-31 LAB
ANION GAP SERPL CALCULATED.3IONS-SCNC: 12 MMOL/L (ref 7–15)
BUN SERPL-MCNC: 12.4 MG/DL (ref 6–20)
CALCIUM SERPL-MCNC: 9.6 MG/DL (ref 8.6–10)
CHLORIDE SERPL-SCNC: 105 MMOL/L (ref 98–107)
CREAT SERPL-MCNC: 0.73 MG/DL (ref 0.51–0.95)
DEPRECATED HCO3 PLAS-SCNC: 20 MMOL/L (ref 22–29)
GFR SERPL CREATININE-BSD FRML MDRD: >90 ML/MIN/1.73M2
GLUCOSE SERPL-MCNC: 96 MG/DL (ref 70–99)
POTASSIUM SERPL-SCNC: 4.3 MMOL/L (ref 3.4–5.3)
SODIUM SERPL-SCNC: 137 MMOL/L (ref 136–145)

## 2023-03-31 PROCEDURE — 90471 IMMUNIZATION ADMIN: CPT | Performed by: INTERNAL MEDICINE

## 2023-03-31 PROCEDURE — 90715 TDAP VACCINE 7 YRS/> IM: CPT | Performed by: INTERNAL MEDICINE

## 2023-03-31 PROCEDURE — 99396 PREV VISIT EST AGE 40-64: CPT | Mod: 25 | Performed by: INTERNAL MEDICINE

## 2023-03-31 PROCEDURE — 36415 COLL VENOUS BLD VENIPUNCTURE: CPT | Performed by: INTERNAL MEDICINE

## 2023-03-31 PROCEDURE — 80048 BASIC METABOLIC PNL TOTAL CA: CPT | Performed by: INTERNAL MEDICINE

## 2023-03-31 PROCEDURE — 99214 OFFICE O/P EST MOD 30 MIN: CPT | Mod: 25 | Performed by: INTERNAL MEDICINE

## 2023-03-31 RX ORDER — LISINOPRIL 20 MG/1
20 TABLET ORAL DAILY
Qty: 90 TABLET | Refills: 3 | Status: SHIPPED | OUTPATIENT
Start: 2023-03-31 | End: 2024-01-02

## 2023-03-31 RX ORDER — VENLAFAXINE HYDROCHLORIDE 150 MG/1
150 CAPSULE, EXTENDED RELEASE ORAL DAILY
Qty: 90 CAPSULE | Refills: 3 | Status: SHIPPED | OUTPATIENT
Start: 2023-03-31 | End: 2024-01-02

## 2023-03-31 RX ORDER — PHENTERMINE HYDROCHLORIDE 30 MG/1
30 CAPSULE ORAL EVERY MORNING
Qty: 90 CAPSULE | Refills: 3 | Status: SHIPPED | OUTPATIENT
Start: 2023-03-31 | End: 2023-12-19

## 2023-03-31 ASSESSMENT — PATIENT HEALTH QUESTIONNAIRE - PHQ9
10. IF YOU CHECKED OFF ANY PROBLEMS, HOW DIFFICULT HAVE THESE PROBLEMS MADE IT FOR YOU TO DO YOUR WORK, TAKE CARE OF THINGS AT HOME, OR GET ALONG WITH OTHER PEOPLE: SOMEWHAT DIFFICULT
SUM OF ALL RESPONSES TO PHQ QUESTIONS 1-9: 15
SUM OF ALL RESPONSES TO PHQ QUESTIONS 1-9: 15

## 2023-03-31 NOTE — LETTER
April 5, 2023      Cecile Lim  [id number] [address] [place] [id number]        Dear ,    We are writing to inform you of your test results.    Kidney function, blood sugar, electrolytes are normal.  Recheck in 1 year.    Resulted Orders   Basic metabolic panel  (Ca, Cl, CO2, Creat, Gluc, K, Na, BUN)   Result Value Ref Range    Sodium 137 136 - 145 mmol/L    Potassium 4.3 3.4 - 5.3 mmol/L    Chloride 105 98 - 107 mmol/L    Carbon Dioxide (CO2) 20 (L) 22 - 29 mmol/L    Anion Gap 12 7 - 15 mmol/L    Urea Nitrogen 12.4 6.0 - 20.0 mg/dL    Creatinine 0.73 0.51 - 0.95 mg/dL    Calcium 9.6 8.6 - 10.0 mg/dL    Glucose 96 70 - 99 mg/dL    GFR Estimate >90 >60 mL/min/1.73m2      Comment:      eGFR calculated using 2021 CKD-EPI equation.       If you have any questions or concerns, please call the clinic at the number listed above.       Sincerely,      Fawn Talley, DO

## 2023-03-31 NOTE — PATIENT INSTRUCTIONS
Mental health  Continue working with counselor  Increase venlafaxine to 150 mg once daily  Follow-up in 4-6 weeks, face to face or virtual  If you have side effects, let me know    Hypertension  Refill sent  Blood work today          Preventive Health Recommendations  Female Ages 40 to 49    Yearly exam:   See your health care provider every year in order to  Review health changes.   Discuss preventive care.    Review your medicines if your doctor prescribed any.    Get a Pap test every three years (unless you have an abnormal result and your provider advises testing more often).    If you get Pap tests with HPV test, you only need to test every 5 years, unless you have an abnormal result. You do not need a Pap test if your uterus was removed (hysterectomy) and you have not had cancer.    You should be tested each year for STDs (sexually transmitted diseases), if you're at risk.   Ask your doctor if you should have a mammogram.    Have a colonoscopy (test for colon cancer) if someone in your family has had colon cancer or polyps before age 50.     Have a cholesterol test every 5 years.     Have a diabetes test (fasting glucose) after age 45. If you are at risk for diabetes, you should have this test every 3 years.    Shots: Get a flu shot each year. Get a tetanus shot every 10 years.     Nutrition:   Eat at least 5 servings of fruits and vegetables each day.  Eat whole-grain bread, whole-wheat pasta and brown rice instead of white grains and rice.  Get adequate Calcium and Vitamin D.      Lifestyle  Exercise at least 150 minutes a week (an average of 30 minutes a day, 5 days a week). This will help you control your weight and prevent disease.  Limit alcohol to one drink per day.  No smoking.   Wear sunscreen to prevent skin cancer.  See your dentist every six months for an exam and cleaning.

## 2023-03-31 NOTE — PROGRESS NOTES
SUBJECTIVE:   CC: Cecile is an 42 year old who presents for preventive health visit.   No flowsheet data found.{Split Bill scripting  The purpose of this visit is to discuss your medical history and prevent health problems before you are sick. You may be responsible for a co-pay, coinsurance, or deductible if your visit today includes services such as checking on a sore throat, having an x-ray or lab test, or treating and evaluating a new or existing condition :839217}  {Patient advised of split billing (Optional):451768}  Healthy Habits:   PHQ-2 Total Score: 2    {Add if <65 person on Medicare  - Required Questions (Optional):885616}  {Outside tests to abstract? :046685}    {additional problems to add (Optional):007759}    Today's PHQ-2 Score:   PHQ-2 ( 1999 Pfizer) 3/31/2023   Q1: Little interest or pleasure in doing things -   Q2: Feeling down, depressed or hopeless -   PHQ-2 Score -   PHQ-2 Total Score (12-17 Years)- Positive if 3 or more points; Administer PHQ-A if positive -   Q1: Little interest or pleasure in doing things -   Q2: Feeling down, depressed or hopeless -   PHQ-2 Score Incomplete           Social History     Tobacco Use     Smoking status: Never     Smokeless tobacco: Never   Substance Use Topics     Alcohol use: Yes     Alcohol/week: 0.0 standard drinks     Comment: Rare. Maybe one drink a month     {Rooming staff  Click this link to complete the Prescreen if response below is not for today's visit  Alcohol Use Prescreen >3 drinks/day or > 7 drinks/week.  If the prescreen question answer is YES, complete the full AUDIT  :780328}    Alcohol Use 2/9/2022   Prescreen: >3 drinks/day or >7 drinks/week? No   {add AUDIT responses (Optional) (A score of 7 for adult men is an indication of hazardous drinking; a score of 8 or more is an indication of an alcohol use disorder.  A score of 7 or more for adult women is an indication of hazardous drinking or an alchohol use disorder):905619}  Reviewed  "orders with patient.  Reviewed health maintenance and updated orders accordingly - { :911680::\"Yes\"}  {Chronicprobdata (optional):816864}    Breast Cancer Screening:    Breast CA Risk Assessment (FHS-7) 2/9/2022 2/9/2022 2/9/2022   Do you have a family history of breast, colon, or ovarian cancer? No / Unknown No / Unknown No / Unknown       {If any of the questions to the BCRA (FHS-7) are answered yes, consider ordering referral for genetic counseling (Optional) :601792::\"click delete button to remove this line now\"}  {AMB Mammogram Decision Support (Optional) :501648}  Pertinent mammograms are reviewed under the imaging tab.    History of abnormal Pap smear: { :983905}  PAP / HPV Latest Ref Rng & Units 2/9/2022 1/5/2017 1/17/2013   PAP   Negative for Intraepithelial Lesion or Malignancy (NILM) - -   PAP (Historical) - - NIL NIL   HPV16 Negative Negative Negative -   HPV18 Negative Negative Negative -   HRHPV Negative Negative Negative -     Reviewed and updated as needed this visit by clinical staff                  Reviewed and updated as needed this visit by Provider                 {HISTORY OPTIONS (Optional):076009}    Review of Systems  {FEMALE ROS (Optional):622750}     OBJECTIVE:   There were no vitals taken for this visit.  Physical Exam  {Exam Choices (Optional):651849}    {Diagnostic Test Results (Optional):109966::\"Diagnostic Test Results:\",\"Labs reviewed in Epic\"}    ASSESSMENT/PLAN:   {Diag Picklist:388999}    {Patient advised of split billing (Optional):956105}      COUNSELING:  {FEMALE COUNSELING MESSAGES:511764::\"Reviewed preventive health counseling, as reflected in patient instructions\"}      BMI:   Estimated body mass index is 29.48 kg/m  as calculated from the following:    Height as of 6/17/22: 1.549 m (5' 1\").    Weight as of 6/17/22: 70.8 kg (156 lb).   {Weight Management Plan needed for ACO:289236}      She reports that she has never smoked. She has never used smokeless " tobacco.      {Counseling Resources  [place] Preventive Services Task Force  Cholesterol Screening  Health diet/nutrition  Pooled Cohorts Equation Calculator  [employer]'s MyPlate  ASA Prophylaxis  Lung CA Screening  Osteoporosis prevention/bone health :925720}  {Breast Cancer Risk Calculator  BRCA-Related Cancer Risk Assessment FHS-7 Tool :188757}  Fawn Talley DO  [facility]  Answers for HPI/ROS submitted by the patient on 3/31/2023  If you checked off any problems, how difficult have these problems made it for you to do your work, take care of things at home, or get along with other people?: Somewhat difficult  PHQ9 TOTAL SCORE: 15

## 2023-03-31 NOTE — PROGRESS NOTES
SUBJECTIVE:   CC: Cecile is an 42 year old who presents for preventive health visit.   Patient has been advised of split billing requirements and indicates understanding: Yes  Healthy Habits:     Getting at least 3 servings of Calcium per day:  Yes    Bi-annual eye exam:  Yes    Dental care twice a year:  Yes    Sleep apnea or symptoms of sleep apnea:  Daytime drowsiness    Diet:  Breakfast skipped and Carbohydrate counting    Frequency of exercise:  2-3 days/week    Duration of exercise:  30-45 minutes    Taking medications regularly:  Yes    Barriers to taking medications:  None    Medication side effects:  None    PHQ-2 Total Score: 2    Additional concerns today:  No      Medication Followup of phentermine     Taking Medication as prescribed: yes    Side Effects:  None    Medication Helping Symptoms:  yes    Hypertension Follow-up      Do you check your blood pressure regularly outside of the clinic? No     Are you following a low salt diet? Yes    Are your blood pressures ever more than 140 on the top number (systolic) OR more   than 90 on the bottom number (diastolic), for example 140/90? No    Mental health  --seeing counselor which is somewhat helpful  --still finds mental health is not doing well  --may be starting divorce process  --has been on fluoxetine, escitalopram, citalopram  --No side effects to venlafaxine  --has mild-mod w/d effects if she misses a dose  -- has a history of recording her phone calls.  No history of abuse/violence.  She feels safe at home    Today's PHQ-2 Score:   PHQ-2 ( 1999 Pfizer) 3/31/2023   Q1: Little interest or pleasure in doing things -   Q2: Feeling down, depressed or hopeless -   PHQ-2 Score -   PHQ-2 Total Score (12-17 Years)- Positive if 3 or more points; Administer PHQ-A if positive -   Q1: Little interest or pleasure in doing things -   Q2: Feeling down, depressed or hopeless -   PHQ-2 Score Incomplete           Social History     Tobacco Use     Smoking  status: Never     Smokeless tobacco: Never   Substance Use Topics     Alcohol use: Yes     Alcohol/week: 0.0 standard drinks     Comment: Rare. Maybe one drink a month         Alcohol Use 2/9/2022   Prescreen: >3 drinks/day or >7 drinks/week? No     Reviewed orders with patient.  Reviewed health maintenance and updated orders accordingly - Yes  Current Outpatient Medications   Medication Sig Dispense Refill     APPLE CIDER VINEGAR PO        lisinopril (ZESTRIL) 20 MG tablet Take 1 tablet (20 mg) by mouth daily 90 tablet 0     phentermine (ADIPEX-P) 30 MG capsule Take 1 capsule (30 mg) by mouth every morning 90 capsule 0     topiramate (TOPAMAX) 25 MG tablet Take 1 tablet (25 mg) by mouth daily 90 tablet 3     venlafaxine (EFFEXOR XR) 37.5 MG 24 hr capsule Take 1 capsule (37.5 mg) by mouth daily Take with 75 mg tablet 90 capsule 3     venlafaxine (EFFEXOR XR) 75 MG 24 hr capsule Take 1 capsule (75 mg) by mouth daily Take with 37.5 mg 90 capsule 3       Breast Cancer Screening:    Breast CA Risk Assessment (FHS-7) 2/9/2022 2/9/2022 2/9/2022   Do you have a family history of breast, colon, or ovarian cancer? No / Unknown No / Unknown No / Unknown         Mammogram Screening - Offered annual screening and updated Health Maintenance for mutual plan based on risk factor consideration    Pertinent mammograms are reviewed under the imaging tab.    History of abnormal Pap smear: NO - age 30-65 PAP every 5 years with negative HPV co-testing recommended  PAP / HPV Latest Ref Rng & Units 2/9/2022 1/5/2017 1/17/2013   PAP   Negative for Intraepithelial Lesion or Malignancy (NILM) - -   PAP (Historical) - - NIL NIL   HPV16 Negative Negative Negative -   HPV18 Negative Negative Negative -   HRHPV Negative Negative Negative -     Reviewed and updated as needed this visit by clinical staff   Tobacco  Allergies  Meds              Reviewed and updated as needed this visit by Provider                     Review of  "Systems  CONSTITUTIONAL: NEGATIVE for fever, chills, change in weight  INTEGUMENTARU/SKIN: NEGATIVE for worrisome rashes, moles or lesions  EYES: NEGATIVE for vision changes or irritation  ENT: NEGATIVE for ear, mouth and throat problems  RESP: NEGATIVE for significant cough or SOB  BREAST: NEGATIVE for masses, tenderness or discharge  CV: NEGATIVE for chest pain, palpitations or peripheral edema  GI: NEGATIVE for nausea, abdominal pain, heartburn, or change in bowel habits  : NEGATIVE for unusual urinary or vaginal symptoms. Periods are regular.  MUSCULOSKELETAL: NEGATIVE for significant arthralgias or myalgia  NEURO: NEGATIVE for weakness, dizziness or paresthesias  PSYCHIATRIC: POSITIVE foranxiety and depressed mood     OBJECTIVE:   /88   Pulse 80   Temp 99  F (37.2  C) (Tympanic)   Resp 16   Ht 1.543 m (5' 0.75\")   Wt 66.6 kg (146 lb 14.4 oz)   SpO2 98%   BMI 27.99 kg/m    Physical Exam  GENERAL: healthy, alert and no distress  EYES: Eyes grossly normal to inspection, PERRL and conjunctivae and sclerae normal  HENT: ear canals and TM's normal, nose and mouth without ulcers or lesions  NECK: no adenopathy, no asymmetry, masses, or scars and thyroid normal to palpation  RESP: lungs clear to auscultation - no rales, rhonchi or wheezes  CV: regular rate and rhythm, normal S1 S2, no S3 or S4, no murmur, click or rub, no peripheral edema and peripheral pulses strong  ABDOMEN: soft, nontender, no hepatosplenomegaly, no masses and bowel sounds normal  MS: no gross musculoskeletal defects noted, no edema  SKIN: no suspicious lesions or rashes  NEURO: Normal strength and tone, mentation intact and speech normal  PSYCH: mentation appears normal, tearful and worried    Diagnostic Test Results:  Labs reviewed in Epic  Results for orders placed or performed in visit on 03/31/23 (from the past 24 hour(s))   Basic metabolic panel  (Ca, Cl, CO2, Creat, Gluc, K, Na, BUN)   Result Value Ref Range    Sodium 137 136 - " "145 mmol/L    Potassium 4.3 3.4 - 5.3 mmol/L    Chloride 105 98 - 107 mmol/L    Carbon Dioxide (CO2) 20 (L) 22 - 29 mmol/L    Anion Gap 12 7 - 15 mmol/L    Urea Nitrogen 12.4 6.0 - 20.0 mg/dL    Creatinine 0.73 0.51 - 0.95 mg/dL    Calcium 9.6 8.6 - 10.0 mg/dL    Glucose 96 70 - 99 mg/dL    GFR Estimate >90 >60 mL/min/1.73m2       ASSESSMENT/PLAN:       ICD-10-CM    1. Routine general medical examination at a health care facility  Z00.00       2. Essential hypertension  I10 lisinopril (ZESTRIL) 20 MG tablet     phentermine (ADIPEX-P) 30 MG capsule     Basic metabolic panel  (Ca, Cl, CO2, Creat, Gluc, K, Na, BUN)     Basic metabolic panel  (Ca, Cl, CO2, Creat, Gluc, K, Na, BUN)     OFFICE/OUTPT VISIT,EST,LEVL IV     CANCELED: Basic metabolic panel  (Ca, Cl, CO2, Creat, Gluc, K, Na, BUN)      3. Generalized anxiety disorder  F41.1 venlafaxine (EFFEXOR XR) 150 MG 24 hr capsule     OFFICE/OUTPT VISIT,EST,LEVL IV      4. Major depressive disorder, recurrent episode, moderate (H)  F33.1 venlafaxine (EFFEXOR XR) 150 MG 24 hr capsule     OFFICE/OUTPT VISIT,EST,LEVL IV      5. Overweight (BMI 25.0-29.9)  E66.3 phentermine (ADIPEX-P) 30 MG capsule        Mental health  1. Continue working with counselor  2. Increase venlafaxine to 150 mg once daily  3. Follow-up in 4-6 weeks, face to face or virtual  4. If you have side effects, let me know    Hypertension  1. Refill sent  2. Blood work today        Patient has been advised of split billing requirements and indicates understanding: Yes      COUNSELING:  Reviewed preventive health counseling, as reflected in patient instructions      BMI:   Estimated body mass index is 27.99 kg/m  as calculated from the following:    Height as of this encounter: 1.543 m (5' 0.75\").    Weight as of this encounter: 66.6 kg (146 lb 14.4 oz).   Weight management plan: Discussed healthy diet and exercise guidelines      She reports that she has never smoked. She has never used smokeless " tobacco.          Fawn Talley, [facility]  Answers for HPI/ROS submitted by the patient on 3/31/2023  If you checked off any problems, how difficult have these problems made it for you to do your work, take care of things at home, or get along with other people?: Somewhat difficult  PHQ9 TOTAL SCORE: 15

## 2023-05-05 ENCOUNTER — HOSPITAL ENCOUNTER (EMERGENCY)
Facility: CLINIC | Age: 43
Discharge: HOME | End: 2023-05-05
Payer: COMMERCIAL

## 2023-05-05 ENCOUNTER — APPOINTMENT (OUTPATIENT)
Dept: GENERAL RADIOLOGY | Facility: CLINIC | Age: 43
End: 2023-05-05
Payer: COMMERCIAL

## 2023-05-05 VITALS
RESPIRATION RATE: 22 BRPM | SYSTOLIC BLOOD PRESSURE: 135 MMHG | DIASTOLIC BLOOD PRESSURE: 84 MMHG | OXYGEN SATURATION: 98 % | HEART RATE: 88 BPM | TEMPERATURE: 98.5 F

## 2023-05-05 DIAGNOSIS — R07.81 RIB PAIN ON LEFT SIDE: ICD-10-CM

## 2023-05-05 DIAGNOSIS — M25.552 HIP PAIN, LEFT: ICD-10-CM

## 2023-05-05 PROCEDURE — G0463 HOSPITAL OUTPT CLINIC VISIT: HCPCS

## 2023-05-05 PROCEDURE — 71101 X-RAY EXAM UNILAT RIBS/CHEST: CPT | Mod: LT

## 2023-05-05 PROCEDURE — 99213 OFFICE O/P EST LOW 20 MIN: CPT

## 2023-05-05 PROCEDURE — 73502 X-RAY EXAM HIP UNI 2-3 VIEWS: CPT

## 2023-05-05 RX ORDER — HYDROCODONE BITARTRATE AND ACETAMINOPHEN 5; 325 MG/1; MG/1
1 TABLET ORAL EVERY 6 HOURS PRN
Qty: 10 TABLET | Refills: 0 | Status: SHIPPED | OUTPATIENT
Start: 2023-05-05 | End: 2023-05-08

## 2023-05-05 ASSESSMENT — ACTIVITIES OF DAILY LIVING (ADL): ADLS_ACUITY_SCORE: 35

## 2023-05-05 ASSESSMENT — ENCOUNTER SYMPTOMS
COLOR CHANGE: 1
NECK PAIN: 0
CARDIOVASCULAR NEGATIVE: 1
ARTHRALGIAS: 1
GASTROINTESTINAL NEGATIVE: 1
NEUROLOGICAL NEGATIVE: 1
CONSTITUTIONAL NEGATIVE: 1
BACK PAIN: 0
ABDOMINAL PAIN: 0
SHORTNESS OF BREATH: 1

## 2023-05-06 NOTE — DISCHARGE INSTRUCTIONS
Incentive spirometer a few times through the day to promote lung expansion.  Pain meds as needed for discomfort.  Please return for any new or worsening symptoms such as fever, shortness of breath, worsening pain or any other new concerns.

## 2023-05-06 NOTE — ED PROVIDER NOTES
History     Chief Complaint   Patient presents with     Rib Pain     Left rib pain. Pt fell off a horse yesterday.      HPI  Cecile Lim is a 42 year old female who presents urgent care for concern of left-sided rib and hip pain.  Patient states that she was riding her horse yesterday, and the saddle came loose forcing her to jump off the horse.  Patient reports that she suspects source was going approximately 30 miles an hour when she jumped off.  States she landed directly onto her right side.  Patient denies any head injury.  She has no neck pain or headache.  Patient denies any loss of consciousness.  States that she initially had difficulty getting up as she was having severe muscle spasms but was eventually able to get up and ride the horse back to the barn. Patient reports pain throughout the left rib cage and also in the left hip.  Patient denies any significant back pain.  Patient does report some shortness of breath but suspects this may be due to the pain in her ribs.  Patient denies any flank pain, hematuria, abdominal pain or other concerns at this time.    Allergies:  No Known Allergies    Problem List:    Patient Active Problem List    Diagnosis Date Noted     Encounter for surveillance of contraceptives 03/10/2016     Priority: Medium      has had vasectomy       Lipoma of back 11/12/2015     Priority: Medium     Major depressive disorder, recurrent episode, in partial remission (HCC) 12/23/2014     Priority: Medium     Essential hypertension 09/18/2012     Priority: Medium     Stress incontinence, female 02/27/2012     Priority: Medium     CARDIOVASCULAR SCREENING; LDL GOAL LESS THAN 160 10/31/2010     Priority: Medium     Generalized anxiety disorder 10/20/2010     Priority: Medium     Ureteral stone 07/09/2009     Priority: Medium        Past Medical History:    Past Medical History:   Diagnosis Date     LAURA (generalised anxiety disorder)      HTN (hypertension)      Suicide  attempt (H) 11/2014       Past Surgical History:    Past Surgical History:   Procedure Laterality Date     EXCISE MASS TRUNK N/A 11/29/2018    Procedure: Excision of Left Back Mass;  Surgeon: Rolando Claros DO;  Location: [facility] OR     EXCISE MASS TRUNK N/A 12/7/2018    Procedure: Evacuation of back hematoma with placement of drain;  Surgeon: Rolando Claros DO;  Location: [facility] OR     NO HISTORY OF SURGERY  1/2007     SLING TRANSVAGINAL N/A 4/27/2018    Procedure: SLING TRANSVAGINAL;  Pubovaginal sling ([place]);  Surgeon: Douglas Hopkins MD;  Location: [facility] OR     SOFT TISSUE SURGERY         Family History:    Family History   Problem Relation Age of Onset     Hypertension Mother      Musculoskeletal Disorder Mother         degenerative disc disease     Thyroid Disease Mother      Depression Mother      Cerebrovascular Disease Mother      Heart Disease Mother      Hypertension Father      Cerebrovascular Disease Father         age 40     Hypertension Maternal Grandfather      Cardiovascular Maternal Grandfather      Cancer Paternal Grandfather         lung     Hypertension Brother      Bartter's syndrome Brother      Asthma No family hx of      C.A.D. No family hx of      Diabetes No family hx of      Breast Cancer No family hx of      Cancer - colorectal No family hx of      Prostate Cancer No family hx of        Social History:  Marital Status:   [2]  Social History     Tobacco Use     Smoking status: Never     Smokeless tobacco: Never   Vaping Use     Vaping status: Never Used   Substance Use Topics     Alcohol use: Yes     Alcohol/week: 0.0 standard drinks of alcohol     Comment: Rare. Maybe one drink a month     Drug use: No        Medications:    APPLE CIDER VINEGAR PO  lisinopril (ZESTRIL) 20 MG tablet  phentermine (ADIPEX-P) 30 MG capsule  topiramate (TOPAMAX) 25 MG tablet  venlafaxine (EFFEXOR XR) 150 MG 24 hr capsule          Review of Systems   Constitutional: Negative.     Respiratory: Positive for shortness of breath.    Cardiovascular: Negative.    Gastrointestinal: Negative.  Negative for abdominal pain.   Genitourinary: Negative.    Musculoskeletal: Positive for arthralgias (left side rib pain and hip pain. ). Negative for back pain and neck pain.   Skin: Positive for color change (bruising left hip and rib cage. ).   Neurological: Negative.    All other systems reviewed and are negative.      Physical Exam   BP: 135/84  Pulse: 88  Temp: 98.5  F (36.9  C)  Resp: 22  SpO2: 98 %      Physical Exam  Constitutional:       General: She is not in acute distress.     Appearance: Normal appearance. She is not diaphoretic.   HENT:      Head: Atraumatic.      Mouth/Throat:      Mouth: Mucous membranes are moist.   Eyes:      General: No scleral icterus.     Conjunctiva/sclera: Conjunctivae normal.   Cardiovascular:      Rate and Rhythm: Normal rate.      Heart sounds: Normal heart sounds.   Pulmonary:      Effort: No respiratory distress.      Breath sounds: Normal breath sounds.   Abdominal:      General: Abdomen is flat.   Musculoskeletal:        Arms:       Cervical back: Neck supple.      Comments: Site of pain.    Skin:     General: Skin is warm.      Findings: No rash.   Neurological:      Mental Status: She is alert.         ED Course                 Procedures             Results for orders placed or performed during the hospital encounter of 05/05/23 (from the past 24 hour(s))   Ribs XR, unilat 3 views + PA chest,  left    Narrative    EXAM: XR RIBS AND CHEST LEFT 3 VIEWS  LOCATION: [facility]  DATE/TIME: 5/5/2023 7:40 PM CDT    INDICATION: fall from horse yesterday, pain through lower left ribs  COMPARISON: None.      Impression    IMPRESSION: The visualized heart and lungs are negative. No rib fractures.   Pelvis XR w/ unilateral hip left    Narrative    EXAM: XR PELVIS AND HIP LEFT 1 VIEW  LOCATION: [facility]  DATE/TIME:  5/5/2023 7:40 PM CDT    INDICATION: fall from horse, hip pain  COMPARISON: None.      Impression    IMPRESSION: No fractures are evident. The hip and SI joints are unremarkable.       Medications - No data to display    Assessments & Plan (with Medical Decision Making)   Patient presented for concern of rib pain and hip pain on the left side following a fall from her horse yesterday.  Patient is afebrile on arrival and is nonhypoxic.  Rib detail xrays performed on the left which were negative for pneumothorax or rib fractures.  X-rays of the left hip and pelvis were also completed which did not reveal any acute fractures or dislocation.  Denies any abdominal pain or hematuria low concern for any splenic injury or kidney injury at this time.  Recommended Tylenol ibuprofen as needed for discomfort.  Patient was provided with Norco for severe pain and to use at night.  Patient also provided with incentive spirometer and recommended using this also times throughout the day.  Patient to return for any new or worsening symptoms such as increased shortness of breath, fever, chest pain, or any other new concerns.  Patient was discharged in good condition is agreeable to the plan.    I have reviewed the nursing notes.    I have reviewed the findings, diagnosis, plan and need for follow up with the patient.        Discharge Medication List as of 5/5/2023  8:06 PM      START taking these medications    Details   HYDROcodone-acetaminophen (NORCO) 5-325 MG tablet Take 1 tablet by mouth every 6 hours as needed for severe pain, Disp-10 tablet, R-0, E-Prescribe             Final diagnoses:   Rib pain on left side   Hip pain, left       5/5/2023   [facility] EMERGENCY DEPT    Disclaimer:  This note consists of symbols derived from keyboarding, dictation and/or voice recognition software.  As a result, there may be errors in the script that have gone undetected.  Please consider this when interpreting information found  in this chart.       Giuseppe Ramirez APRN CNP  05/05/23 2040

## 2023-10-11 ENCOUNTER — VIRTUAL VISIT (OUTPATIENT)
Dept: UROLOGY | Facility: CLINIC | Age: 43
End: 2023-10-11
Payer: COMMERCIAL

## 2023-10-11 DIAGNOSIS — N39.3 STRESS INCONTINENCE, FEMALE: Primary | ICD-10-CM

## 2023-10-11 PROCEDURE — 99203 OFFICE O/P NEW LOW 30 MIN: CPT | Mod: VID | Performed by: STUDENT IN AN ORGANIZED HEALTH CARE EDUCATION/TRAINING PROGRAM

## 2023-10-11 NOTE — PROGRESS NOTES
Cecile Lim is a 42 year old year old who is being evaluated via a billable video visit.      How would you like to obtain your AVS? MyChart  If the video visit is dropped, the invitation should be resent by: Text to cell phone: [phone]  Will anyone else be joining your video visit? No    Video-Visit Details    Type of service:  Video Visit   Video Start Time: 9:30 AM  Video End Time:9:37 AM    Originating Location (pt. Location): Home    Distant Location (provider location):  On-site  Platform used for Video Visit: Tasted Menu

## 2023-10-30 ENCOUNTER — HOSPITAL ENCOUNTER (EMERGENCY)
Facility: CLINIC | Age: 43
Discharge: HOME | End: 2023-10-30
Attending: PHYSICIAN ASSISTANT | Admitting: PHYSICIAN ASSISTANT
Payer: COMMERCIAL

## 2023-10-30 ENCOUNTER — APPOINTMENT (OUTPATIENT)
Dept: GENERAL RADIOLOGY | Facility: CLINIC | Age: 43
End: 2023-10-30
Attending: PHYSICIAN ASSISTANT
Payer: COMMERCIAL

## 2023-10-30 VITALS
RESPIRATION RATE: 16 BRPM | OXYGEN SATURATION: 100 % | TEMPERATURE: 98.1 F | HEART RATE: 72 BPM | DIASTOLIC BLOOD PRESSURE: 82 MMHG | SYSTOLIC BLOOD PRESSURE: 133 MMHG

## 2023-10-30 DIAGNOSIS — R07.81 RIB PAIN ON RIGHT SIDE: ICD-10-CM

## 2023-10-30 PROCEDURE — G0463 HOSPITAL OUTPT CLINIC VISIT: HCPCS | Mod: 25 | Performed by: PHYSICIAN ASSISTANT

## 2023-10-30 PROCEDURE — 99213 OFFICE O/P EST LOW 20 MIN: CPT | Performed by: PHYSICIAN ASSISTANT

## 2023-10-30 PROCEDURE — 71101 X-RAY EXAM UNILAT RIBS/CHEST: CPT | Mod: RT

## 2023-10-30 RX ORDER — HYDROCODONE BITARTRATE AND ACETAMINOPHEN 5; 325 MG/1; MG/1
1 TABLET ORAL EVERY 6 HOURS PRN
Qty: 10 TABLET | Refills: 0 | Status: SHIPPED | OUTPATIENT
Start: 2023-10-30 | End: 2023-11-02

## 2023-10-30 ASSESSMENT — ACTIVITIES OF DAILY LIVING (ADL): ADLS_ACUITY_SCORE: 35

## 2023-10-30 ASSESSMENT — ENCOUNTER SYMPTOMS
WHEEZING: 0
NECK PAIN: 0
CHILLS: 0
LIGHT-HEADEDNESS: 0
COUGH: 0
HEADACHES: 0
PHOTOPHOBIA: 0
PALPITATIONS: 0
NUMBNESS: 0
NAUSEA: 0
WOUND: 0
COLOR CHANGE: 0
DIZZINESS: 0
BACK PAIN: 0
FEVER: 0
DIARRHEA: 0
WEAKNESS: 0
VOMITING: 0
ABDOMINAL PAIN: 0

## 2023-10-30 NOTE — ED PROVIDER NOTES
History   No chief complaint on file.    GILMER Lim is a 42 year old female who presents to urgent care with concern over right rib/chest wall pain after she sustained a fall from a horse yesterday.  Patient  was riding her horse when animal tripped over boulders resulting in her falling landing on her right side.  She was not wearing helmet or protective gear at that time.  She complains of pain of the right inferior anterior and lateral chest wall which is worse with movement, palpation and deep breathing.  She is unsure if she actually feels short of breath or is just self restricting breathing due to discomfort.  She also complains of sensation of movement of bones when breathing.   She is unaware of any ecchymosis, lacerations or abrasions.  She denies any significant current headache, dizziness, lightheadedness, neck pain, palpitations, nausea, vomiting, abdominal.  She has attempted to treat with ibuprofen 800 mg every six hours and has been wearing compressive binding.   Patient had similar 5/5/23 and state only got relief with prescribed pain medications.      Allergies:  No Known Allergies    Problem List:    Patient Active Problem List    Diagnosis Date Noted    Encounter for surveillance of contraceptives 03/10/2016     Priority: Medium      has had vasectomy      Lipoma of back 11/12/2015     Priority: Medium    Major depressive disorder, recurrent episode, in partial remission (HCC) 12/23/2014     Priority: Medium    Essential hypertension 09/18/2012     Priority: Medium    Stress incontinence, female 02/27/2012     Priority: Medium    CARDIOVASCULAR SCREENING; LDL GOAL LESS THAN 160 10/31/2010     Priority: Medium    Generalized anxiety disorder 10/20/2010     Priority: Medium    Ureteral stone 07/09/2009     Priority: Medium      Past Medical History:    Past Medical History:   Diagnosis Date    LAURA (generalised anxiety disorder)     HTN (hypertension)     Suicide attempt (H)  11/2014     Past Surgical History:    Past Surgical History:   Procedure Laterality Date    EXCISE MASS TRUNK N/A 11/29/2018    Procedure: Excision of Left Back Mass;  Surgeon: Rolando Claros DO;  Location: [facility]    EXCISE MASS TRUNK N/A 12/7/2018    Procedure: Evacuation of back hematoma with placement of drain;  Surgeon: Rolando Claros DO;  Location: [facility]    NO HISTORY OF SURGERY  1/2007    SLING TRANSVAGINAL N/A 4/27/2018    Procedure: SLING TRANSVAGINAL;  Pubovaginal sling ([facility]);  Surgeon: Douglas Hopkins MD;  Location: [facility]    SOFT TISSUE SURGERY       Family History:    Family History   Problem Relation Age of Onset    Hypertension Mother     Musculoskeletal Disorder Mother         degenerative disc disease    Thyroid Disease Mother     Depression Mother     Cerebrovascular Disease Mother     Heart Disease Mother     Hypertension Father     Cerebrovascular Disease Father         age 40    Hypertension Maternal Grandfather     Cardiovascular Maternal Grandfather     Cancer Paternal Grandfather         lung    Hypertension Brother     Bartter's syndrome Brother     Asthma No family hx of     C.A.D. No family hx of     Diabetes No family hx of     Breast Cancer No family hx of     Cancer - colorectal No family hx of     Prostate Cancer No family hx of        Social History:  Marital Status:   [2]  Social History     Tobacco Use    Smoking status: Never    Smokeless tobacco: Never   Vaping Use    Vaping Use: Never used   Substance Use Topics    Alcohol use: Yes     Alcohol/week: 0.0 standard drinks of alcohol     Comment: Rare. Maybe one drink a month    Drug use: No        Medications:    APPLE CIDER VINEGAR PO  lisinopril (ZESTRIL) 20 MG tablet  phentermine (ADIPEX-P) 30 MG capsule  topiramate (TOPAMAX) 25 MG tablet  venlafaxine (EFFEXOR XR) 150 MG 24 hr capsule      Review of Systems   Constitutional:  Negative for chills and fever.   Eyes:  Negative for photophobia and  visual disturbance.   Respiratory:  Negative for cough and wheezing.    Cardiovascular:  Positive for chest pain (right sided chest wall). Negative for palpitations.   Gastrointestinal:  Negative for abdominal pain, diarrhea, nausea and vomiting.   Musculoskeletal:  Negative for back pain and neck pain.   Skin:  Negative for color change, rash and wound.   Neurological:  Negative for dizziness, weakness, light-headedness, numbness and headaches.     Physical Exam   BP: 133/82  Pulse: 72  Temp: 98.1  F (36.7  C)  Resp: 16  SpO2: 100 %  Physical Exam  Constitutional:       Appearance: She is not ill-appearing or toxic-appearing.      Comments: Patient appears in some discomfort due to pain   Cardiovascular:      Rate and Rhythm: Normal rate and regular rhythm.      Heart sounds: No murmur heard.     No friction rub. No gallop.   Pulmonary:      Effort: No respiratory distress.      Breath sounds: No wheezing.   Chest:      Chest wall: Tenderness present. No lacerations, deformity, swelling, crepitus or edema. There is no dullness to percussion.      Comments: Tenderness to palpation of right inferior anterior and lateral ribs  Abdominal:      Palpations: Abdomen is soft.      Tenderness: There is no abdominal tenderness. There is no guarding.   Skin:     General: Skin is warm and dry.      Findings: No abrasion, erythema, laceration or rash.   Neurological:      Mental Status: She is alert and oriented to person, place, and time.      GCS: GCS eye subscore is 4. GCS verbal subscore is 5. GCS motor subscore is 6.      Sensory: No sensory deficit.       ED Course           Procedures       Critical Care time:  none        Results for orders placed or performed during the hospital encounter of 10/30/23   Ribs XR, unilat 3 views + PA chest, right     Status: None    Narrative    EXAM: XR RIBS and CHEST RT 3VW  LOCATION: [facility]  DATE: 10/30/2023    INDICATION: pain after fall from horse  yesterday  COMPARISON: None.      Impression    IMPRESSION: The visualized heart and lungs are negative. No rib fractures.       Medications - No data to display    Assessments & Plan (with Medical Decision Making)     I have reviewed the nursing notes.  I have reviewed the findings, diagnosis, plan and need for follow up with the patient.         Discharge Medication List as of 10/30/2023  7:29 PM        START taking these medications    Details   HYDROcodone-acetaminophen (NORCO) 5-325 MG tablet Take 1 tablet by mouth every 6 hours as needed for severe pain, Disp-10 tablet, R-0, E-Prescribe             Final diagnoses:   Rib pain on right side     42-year-old female presents to urgent care with concern over right-sided rib/chest wall pain after she sustained a fall from a horse yesterday.  X-ray of her ribs was obtained and did not titrate admits of acute fracture.  We discussed potential limitations of rib fractures and risk of potential occult injury versus rib contusion.  She did not have any intra-abdominal tenderness or worrisome physical exam and findings to suggest intra-abdominal pathology.  She was discharged home stable with instructions for symptomatic treatment with ice, Tylenol/ibuprofen as needed.  I did agree to provide small number of Norco to be used as needed for breakthrough pain.  She was instructed to avoid use of binder.  Follow up if no improvement in 5-7 days. Worrisome reasons to return to ER/UC sooner discussed.     Disclaimer: This note consists of symbols derived from keyboarding, dictation, and/or voice recognition software. As a result, there may be errors in the script that have gone undetected.  Please consider this when interpreting information found in the chart.      10/30/2023   [facility] EMERGENCY DEPT       Jenae Dyer PA-C  11/01/23 2044

## 2023-11-01 ENCOUNTER — OFFICE VISIT (OUTPATIENT)
Dept: UROLOGY | Facility: CLINIC | Age: 43
End: 2023-11-01
Payer: COMMERCIAL

## 2023-11-01 VITALS
WEIGHT: 154 LBS | BODY MASS INDEX: 29.34 KG/M2 | SYSTOLIC BLOOD PRESSURE: 122 MMHG | HEART RATE: 69 BPM | OXYGEN SATURATION: 100 % | DIASTOLIC BLOOD PRESSURE: 84 MMHG

## 2023-11-01 DIAGNOSIS — N39.3 STRESS INCONTINENCE, FEMALE: Primary | ICD-10-CM

## 2023-11-01 LAB
ALBUMIN UR-MCNC: NEGATIVE MG/DL
APPEARANCE UR: CLEAR
BACTERIA #/AREA URNS HPF: ABNORMAL /HPF
BILIRUB UR QL STRIP: NEGATIVE
COLOR UR AUTO: YELLOW
GLUCOSE UR STRIP-MCNC: NEGATIVE MG/DL
HGB UR QL STRIP: ABNORMAL
KETONES UR STRIP-MCNC: ABNORMAL MG/DL
LEUKOCYTE ESTERASE UR QL STRIP: NEGATIVE
MUCOUS THREADS #/AREA URNS LPF: PRESENT /LPF
NITRATE UR QL: NEGATIVE
PH UR STRIP: 7 [PH] (ref 5–7)
RBC #/AREA URNS AUTO: ABNORMAL /HPF
SP GR UR STRIP: 1.02 (ref 1–1.03)
SQUAMOUS #/AREA URNS AUTO: ABNORMAL /LPF
UROBILINOGEN UR STRIP-ACNC: 1 E.U./DL
WBC #/AREA URNS AUTO: ABNORMAL /HPF

## 2023-11-01 PROCEDURE — 81001 URINALYSIS AUTO W/SCOPE: CPT | Performed by: UROLOGY

## 2023-11-01 PROCEDURE — 52000 CYSTOURETHROSCOPY: CPT | Performed by: UROLOGY

## 2023-11-01 PROCEDURE — 99205 OFFICE O/P NEW HI 60 MIN: CPT | Mod: 25 | Performed by: UROLOGY

## 2023-11-01 NOTE — PROGRESS NOTES
Cecile Lim is a 42 year old female seen in consultation for incontinence. Consult from Fawn Talley.        2018 Sling by me  10/11/23 Negin Doan: MARYELLEN >> to us      Pt reports leakage with horseback riding and running, needs to wear pad. No leakage with other activities. No urge or insensate loss.      Denies dysuria, gross hematuria, frequency and nocturia.     Drinks 3 caf coffee, 4 [place] per day.      Considerable back pain due to fall of a horse recently.      PMH includes recent fall off horse, anxiety, HTN, suicide attempt, adjustment disorder, major depression        Past Medical History:   Diagnosis Date    LAURA (generalised anxiety disorder)     HTN (hypertension)     Suicide attempt (H) 11/2014       Past Surgical History:   Procedure Laterality Date    EXCISE MASS TRUNK N/A 11/29/2018    Procedure: Excision of Left Back Mass;  Surgeon: Rolando Claros DO;  Location: [facility]    EXCISE MASS TRUNK N/A 12/7/2018    Procedure: Evacuation of back hematoma with placement of drain;  Surgeon: Rolando Claros DO;  Location: [facility]    NO HISTORY OF SURGERY  1/2007    SLING TRANSVAGINAL N/A 4/27/2018    Procedure: SLING TRANSVAGINAL;  Pubovaginal sling ([facility]);  Surgeon: Douglas Hopkins MD;  Location: [facility]    SOFT TISSUE SURGERY         Social History     Socioeconomic History    Marital status:      Spouse name: Kenn    Number of children: 0    Years of education: Not on file    Highest education level: Not on file   Occupational History    Occupation: Nurse     Employer: [employer]   Tobacco Use    Smoking status: Never    Smokeless tobacco: Never   Vaping Use    Vaping Use: Never used   Substance and Sexual Activity    Alcohol use: Yes     Alcohol/week: 0.0 standard drinks of alcohol     Comment: Rare. Maybe one drink a month    Drug use: No    Sexual activity: Yes     Partners: Male     Birth control/protection: Condom   Other Topics Concern      Service No    Blood Transfusions No    Caffeine Concern No    Occupational Exposure No    Hobby Hazards No    Sleep Concern No    Stress Concern No    Weight Concern No    Special Diet No    Back Care No    Exercise Yes     Comment: runner,walk    Bike Helmet No     Comment: recommended    Seat Belt Yes    Self-Exams No    Parent/sibling w/ CABG, MI or angioplasty before 65F 55M? Yes     Comment: maternal grandfather   Social History Narrative    .    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       Social Determinants of Health     Financial Resource Strain: Not on file   Food Insecurity: Not on file   Transportation Needs: Not on file   Physical Activity: Not on file   Stress: Not on file   Social Connections: Not on file   Interpersonal Safety: Not on file   Housing Stability: Not on file       Current Outpatient Medications   Medication Sig Dispense Refill    APPLE CIDER VINEGAR PO       HYDROcodone-acetaminophen (NORCO) 5-325 MG tablet Take 1 tablet by mouth every 6 hours as needed for severe pain 10 tablet 0    lisinopril (ZESTRIL) 20 MG tablet Take 1 tablet (20 mg) by mouth daily 90 tablet 3    phentermine (ADIPEX-P) 30 MG capsule Take 1 capsule (30 mg) by mouth every morning 90 capsule 3    topiramate (TOPAMAX) 25 MG tablet Take 1 tablet (25 mg) by mouth daily 90 tablet 3    venlafaxine (EFFEXOR XR) 150 MG 24 hr capsule Take 1 capsule (150 mg) by mouth daily 90 capsule 3       Physical Exam:    GENL: NAD.    ABD: Soft, non-tender, no masses.    EG: Well-estrogenized, no masses.    VAGINA: Well-estrogenized, no masses.    BN HYPERMOBILITY: Mild.    CYSTOCELE: Grade 1.    APICAL PROLAPSE: Mild.    RECTOCELE: Mild.    BIMANUAL: No mass or tenderness.    Cysto:    (Informed consent obtained. Pause for cause performed)   Sterile prep.    17 Fr scope inserted through urethra. Systematic examination w 70 degree lens.   PVR: 10 cc   MUCOSA: Normal without lesion   ORIFICES: Normal location and morphology   CAPACITY:  400 cc; no pain with filling   Scope withdrawn without untoward effect.    Valsalva:   Mild hypermobility, no leakage noted.    (Pt tolerated procedure without difficulty).      Results for orders placed or performed in visit on 11/01/23   UA reflex to Microscopic     Status: Abnormal   Result Value Ref Range    Color Urine Yellow Colorless, Straw, Light Yellow, Yellow    Appearance Urine Clear Clear    Glucose Urine Negative Negative mg/dL    Bilirubin Urine Negative Negative    Ketones Urine Trace (A) Negative mg/dL    Specific Gravity Urine 1.020 1.003 - 1.035    Blood Urine Moderate (A) Negative    pH Urine 7.0 5.0 - 7.0    Protein Albumin Urine Negative Negative mg/dL    Urobilinogen Urine 1.0 0.2, 1.0 E.U./dL    Nitrite Urine Negative Negative    Leukocyte Esterase Urine Negative Negative           IMP:  1. MARYELLEN after sling several years ago, only with horsebackriding and running      PLAN:  Discussed situation with patient in detail.  Consider urethral bulking with Bulkamid; discussed rationale, technical aspects, risk, complications, etc; pt elects to proceed; will schedule once she recovers from her horse accident  Total time spent in reviewing patient records, discussing history, performing exam, discussing diagnosis, outlining treatment plan and documentation: 60 minutes

## 2023-12-04 ENCOUNTER — TELEPHONE (OUTPATIENT)
Dept: UROLOGY | Facility: CLINIC | Age: 43
End: 2023-12-04
Payer: COMMERCIAL

## 2023-12-04 NOTE — LETTER
[facility]  [address] [facility] [address]            December 29, 2023    Cecile Ramírez Strub                                                                                                                     [address] [address] [place] [id number]            Dear Cecile,    We have been unsuccessful in reaching you. Are you still interested in Bulkamid with Dr. Hopkins?  I am looking at scheduling this for 1/11/24 at 1330. Please let me know if this works for you.    Sincerely,         Douglas Hopkins MD/OZZIE Evans RN 12/29/2023 11:44 AM

## 2023-12-18 ENCOUNTER — PATIENT OUTREACH (OUTPATIENT)
Dept: CARE COORDINATION | Facility: CLINIC | Age: 43
End: 2023-12-18
Payer: COMMERCIAL

## 2023-12-18 DIAGNOSIS — E66.3 OVERWEIGHT (BMI 25.0-29.9): ICD-10-CM

## 2023-12-18 DIAGNOSIS — I10 ESSENTIAL HYPERTENSION: ICD-10-CM

## 2023-12-19 RX ORDER — PHENTERMINE HYDROCHLORIDE 30 MG/1
30 CAPSULE ORAL EVERY MORNING
Qty: 90 CAPSULE | Refills: 0 | Status: SHIPPED | OUTPATIENT
Start: 2023-12-19 | End: 2024-01-02

## 2024-01-02 ENCOUNTER — VIRTUAL VISIT (OUTPATIENT)
Dept: FAMILY MEDICINE | Facility: CLINIC | Age: 44
End: 2024-01-02
Payer: COMMERCIAL

## 2024-01-02 DIAGNOSIS — Z12.31 VISIT FOR SCREENING MAMMOGRAM: ICD-10-CM

## 2024-01-02 DIAGNOSIS — E66.3 OVERWEIGHT (BMI 25.0-29.9): ICD-10-CM

## 2024-01-02 DIAGNOSIS — Z12.11 SCREEN FOR COLON CANCER: Primary | ICD-10-CM

## 2024-01-02 DIAGNOSIS — I10 ESSENTIAL HYPERTENSION: ICD-10-CM

## 2024-01-02 DIAGNOSIS — F33.1 MAJOR DEPRESSIVE DISORDER, RECURRENT EPISODE, MODERATE (H): ICD-10-CM

## 2024-01-02 DIAGNOSIS — F41.1 GENERALIZED ANXIETY DISORDER: Chronic | ICD-10-CM

## 2024-01-02 PROCEDURE — 99214 OFFICE O/P EST MOD 30 MIN: CPT | Mod: 95 | Performed by: INTERNAL MEDICINE

## 2024-01-02 RX ORDER — PHENTERMINE HYDROCHLORIDE 30 MG/1
30 CAPSULE ORAL EVERY MORNING
Qty: 90 CAPSULE | Refills: 1 | Status: SHIPPED | OUTPATIENT
Start: 2024-01-02 | End: 2024-05-08 | Stop reason: DRUGHIGH

## 2024-01-02 RX ORDER — VENLAFAXINE HYDROCHLORIDE 150 MG/1
150 CAPSULE, EXTENDED RELEASE ORAL DAILY
Qty: 90 CAPSULE | Refills: 3 | Status: SHIPPED | OUTPATIENT
Start: 2024-01-02

## 2024-01-02 RX ORDER — TOPIRAMATE 25 MG/1
25 TABLET, FILM COATED ORAL DAILY
Qty: 90 TABLET | Refills: 3 | Status: SHIPPED | OUTPATIENT
Start: 2024-01-02

## 2024-01-02 RX ORDER — LISINOPRIL 20 MG/1
20 TABLET ORAL DAILY
Qty: 90 TABLET | Refills: 3 | Status: SHIPPED | OUTPATIENT
Start: 2024-01-02

## 2024-01-02 ASSESSMENT — ANXIETY QUESTIONNAIRES
7. FEELING AFRAID AS IF SOMETHING AWFUL MIGHT HAPPEN: NOT AT ALL
4. TROUBLE RELAXING: MORE THAN HALF THE DAYS
5. BEING SO RESTLESS THAT IT IS HARD TO SIT STILL: NEARLY EVERY DAY
IF YOU CHECKED OFF ANY PROBLEMS ON THIS QUESTIONNAIRE, HOW DIFFICULT HAVE THESE PROBLEMS MADE IT FOR YOU TO DO YOUR WORK, TAKE CARE OF THINGS AT HOME, OR GET ALONG WITH OTHER PEOPLE: SOMEWHAT DIFFICULT
3. WORRYING TOO MUCH ABOUT DIFFERENT THINGS: NOT AT ALL
IF YOU CHECKED OFF ANY PROBLEMS ON THIS QUESTIONNAIRE, HOW DIFFICULT HAVE THESE PROBLEMS MADE IT FOR YOU TO DO YOUR WORK, TAKE CARE OF THINGS AT HOME, OR GET ALONG WITH OTHER PEOPLE: NOT DIFFICULT AT ALL
3. WORRYING TOO MUCH ABOUT DIFFERENT THINGS: NOT AT ALL
2. NOT BEING ABLE TO STOP OR CONTROL WORRYING: NOT AT ALL
6. BECOMING EASILY ANNOYED OR IRRITABLE: SEVERAL DAYS
5. BEING SO RESTLESS THAT IT IS HARD TO SIT STILL: MORE THAN HALF THE DAYS
1. FEELING NERVOUS, ANXIOUS, OR ON EDGE: NOT AT ALL
6. BECOMING EASILY ANNOYED OR IRRITABLE: SEVERAL DAYS
2. NOT BEING ABLE TO STOP OR CONTROL WORRYING: NOT AT ALL
7. FEELING AFRAID AS IF SOMETHING AWFUL MIGHT HAPPEN: NOT AT ALL
GAD7 TOTAL SCORE: 7
GAD7 TOTAL SCORE: 5
GAD7 TOTAL SCORE: 5
7. FEELING AFRAID AS IF SOMETHING AWFUL MIGHT HAPPEN: NOT AT ALL
1. FEELING NERVOUS, ANXIOUS, OR ON EDGE: NOT AT ALL
GAD7 TOTAL SCORE: 5
8. IF YOU CHECKED OFF ANY PROBLEMS, HOW DIFFICULT HAVE THESE MADE IT FOR YOU TO DO YOUR WORK, TAKE CARE OF THINGS AT HOME, OR GET ALONG WITH OTHER PEOPLE?: SOMEWHAT DIFFICULT

## 2024-01-02 ASSESSMENT — PATIENT HEALTH QUESTIONNAIRE - PHQ9
5. POOR APPETITE OR OVEREATING: NEARLY EVERY DAY
10. IF YOU CHECKED OFF ANY PROBLEMS, HOW DIFFICULT HAVE THESE PROBLEMS MADE IT FOR YOU TO DO YOUR WORK, TAKE CARE OF THINGS AT HOME, OR GET ALONG WITH OTHER PEOPLE: SOMEWHAT DIFFICULT
SUM OF ALL RESPONSES TO PHQ QUESTIONS 1-9: 10
SUM OF ALL RESPONSES TO PHQ QUESTIONS 1-9: 10

## 2024-01-02 NOTE — PATIENT INSTRUCTIONS
Weight  Can check into insurance coverage on Saxenda or Wegovy  Continue phentermine and topamax  Discussed meal planning for lunch, adding on breakfast then dinner  Work to incorporate movement into your day - stretching, walking, etc    Mental Health  Refill of medications  Follow-up if you feel medications need to be changed    Health Care Maintenance  You are due for a colonoscopy.  Please call [phone] to schedule  You are due for a mammogram.  Please call [phone] to schedule.

## 2024-01-02 NOTE — PROGRESS NOTES
Cecile is a 43 year old who is being evaluated via a billable video visit.      How would you like to obtain your AVS? MyChart  If the video visit is dropped, the invitation should be resent by: Text to cell phone: [phone]  Will anyone else be joining your video visit? No          Assessment & Plan   Problem List Items Addressed This Visit       Essential hypertension (Chronic)    Relevant Medications    lisinopril (ZESTRIL) 20 MG tablet    phentermine (ADIPEX-P) 30 MG capsule    Other Relevant Orders    Basic metabolic panel  (Ca, Cl, CO2, Creat, Gluc, K, Na, BUN)    Generalized anxiety disorder (Chronic)    Relevant Medications    topiramate (TOPAMAX) 25 MG tablet    venlafaxine (EFFEXOR XR) 150 MG 24 hr capsule     Other Visit Diagnoses       Screen for colon cancer    -  Primary    Relevant Orders    Colonoscopy Screening  Referral    Overweight (BMI 25.0-29.9)        Relevant Medications    phentermine (ADIPEX-P) 30 MG capsule    BMI 32.0-32.9,adult        Relevant Medications    topiramate (TOPAMAX) 25 MG tablet    Major depressive disorder, recurrent episode, moderate (H)        Relevant Medications    venlafaxine (EFFEXOR XR) 150 MG 24 hr capsule    Visit for screening mammogram        Relevant Orders    MA SCREENING DIGITAL BILAT - Future  (s+30)           We discussed her suicidal ideation that she had 5+ months ago.  She was able to identify a safety plan if those thoughts were to return.  She has good support from friends and her psychologist.      Patient Instructions   Weight  Can check into insurance coverage on Saxenda or Wegovy  Continue phentermine and topamax  Discussed meal planning for lunch, adding on breakfast then dinner  Work to incorporate movement into your day - stretching, walking, etc    Mental Health  Refill of medications  Follow-up if you feel medications need to be changed    Health Care Maintenance  You are due for a colonoscopy.  Please call [phone] to  schedule  You are due for a mammogram.  Please call [phone] to schedule.        Fawn Talley DO  [facility]    Jeanmarie Huber is a 43 year old, presenting for the following health issues:  Hypertension, Depression, and Anxiety        1/2/2024    11:06 AM   Additional Questions   Roomed by ryann harding   Accompanied by self         1/2/2024    11:06 AM   Patient Reported Additional Medications   Patient reports taking the following new medications none       HPI     Chief Complaint   Patient presents with    Hypertension    Depression    Anxiety         Hypertension Follow-up    Do you check your blood pressure regularly outside of the clinic? Yes   Are you following a low salt diet? Yes  Are your blood pressures ever more than 140 on the top number (systolic) OR more   than 90 on the bottom number (diastolic), for example 140/90? No    Depression and Anxiety Follow-Up  How are you doing with your depression since your last visit? No change  How are you doing with your anxiety since your last visit?  No change  Are you having other symptoms that might be associated with depression or anxiety? No  Have you had a significant life event? No   Do you have any concerns with your use of alcohol or other drugs? No  Seeing a counselor and working on childhood trauma.  Still having relationship issues with .  Plans to go to marriage retreat in Feb  Had suicidal thoughts in Aug; counselor is aware  No longer having SI; if thoughts returned, she would reach out to her counselor or contact her friend;  music is comforting to her as well  Doesn't feel need to change medications at this time    Social History     Tobacco Use    Smoking status: Never    Smokeless tobacco: Never   Vaping Use    Vaping Use: Never used   Substance Use Topics    Alcohol use: Yes     Alcohol/week: 0.0 standard drinks of alcohol     Comment: Rare. Maybe one drink a month    Drug use: No         12/30/2022      1:15 PM 3/31/2023    10:13 AM 1/2/2024     5:15 PM   PHQ   PHQ-9 Total Score 13 15 7   Q9: Thoughts of better off dead/self-harm past 2 weeks Not at all Several days Not at all   F/U: Thoughts of suicide or self-harm  No    F/U: Safety concerns  No          1/14/2021     1:50 PM 12/30/2022     1:15 PM 1/2/2024     5:15 PM   LAURA-7 SCORE   Total Score 3 17 7         1/2/2024     5:15 PM   Last PHQ-9   1.  Little interest or pleasure in doing things 0   2.  Feeling down, depressed, or hopeless 0   3.  Trouble falling or staying asleep, or sleeping too much 3   4.  Feeling tired or having little energy 3   5.  Poor appetite or overeating 1   6.  Feeling bad about yourself 0   7.  Trouble concentrating 0   8.  Moving slowly or restless 0   Q9: Thoughts of better off dead/self-harm past 2 weeks 0   PHQ-9 Total Score 7   Difficulty at work, home, or with people Somewhat difficult         1/2/2024     5:15 PM   LAURA-7    1. Feeling nervous, anxious, or on edge 0   2. Not being able to stop or control worrying 0   3. Worrying too much about different things 0   4. Trouble relaxing 3   5. Being so restless that it is hard to sit still 3   6. Becoming easily annoyed or irritable 1   7. Feeling afraid, as if something awful might happen 0   LAURA-7 Total Score 7   If you checked any problems, how difficult have they made it for you to do your work, take care of things at home, or get along with other people? Not difficult at all       Suicide Assessment Five-step Evaluation and Treatment (SAFE-T)    How many servings of fruits and vegetables do you eat daily?  2-3  On average, how many sweetened beverages do you drink each day (Examples: soda, juice, sweet tea, etc.  Do NOT count diet or artificially sweetened beverages)?   2  How many days per week do you exercise enough to make your heart beat faster? 0  How many minutes a day do you exercise enough to make your heart beat faster? 0  How many days per week do you miss taking  your medication? 0    Medication Followup of topiramate (TOPAMAX) 25 MG tablet / phentermine (ADIPEX-P) 30 MG capsule     Taking Medication as prescribed: NO  Side Effects:  None  Medication Helping Symptoms:  NO not losing any weight, 156   Intolerance to higher dose of topamax      Current Outpatient Medications   Medication Sig Dispense Refill    lisinopril (ZESTRIL) 20 MG tablet Take 1 tablet (20 mg) by mouth daily 90 tablet 3    phentermine (ADIPEX-P) 30 MG capsule Take 1 capsule (30 mg) by mouth every morning 90 capsule 0    topiramate (TOPAMAX) 25 MG tablet Take 1 tablet (25 mg) by mouth daily 90 tablet 3    venlafaxine (EFFEXOR XR) 150 MG 24 hr capsule Take 1 capsule (150 mg) by mouth daily 90 capsule 3    APPLE CIDER VINEGAR PO            Review of Systems   Constitutional, HEENT, cardiovascular, pulmonary, gi and gu systems are negative, except as otherwise noted.      Objective           Vitals:  No vitals were obtained today due to virtual visit.    Physical Exam   GENERAL: Healthy, alert and no distress  EYES: Eyes grossly normal to inspection.  No discharge or erythema, or obvious scleral/conjunctival abnormalities.  RESP: No audible wheeze, cough, or visible cyanosis.  No visible retractions or increased work of breathing.    SKIN: Visible skin clear. No significant rash, abnormal pigmentation or lesions.  NEURO: Cranial nerves grossly intact.  Mentation and speech appropriate for age.  PSYCH: Mentation appears normal, affect normal/bright, judgement and insight intact, normal speech and appearance well-groomed.                Video-Visit Details    Type of service:  Video Visit     Originating Location (pt. Location): Home    Distant Location (provider location):  On-site  Platform used for Video Visit: Marlyn

## 2024-02-09 ENCOUNTER — TELEPHONE (OUTPATIENT)
Dept: UROLOGY | Facility: CLINIC | Age: 44
End: 2024-02-09
Payer: COMMERCIAL

## 2024-02-09 NOTE — LETTER
[address]  [facility] [place] [address]  [phone]          February 16, 2024    Cecile Lim                                                                                                                     [address] [address] [place] [id number]            Dear Cecile,        We have been unable to reach you by phone.  Please call our office to set up your bulkamid treatment when you ready, [phone].  Thank you,  [facility]

## 2024-02-09 NOTE — TELEPHONE ENCOUNTER
[facility] Call Center    Phone Message    May a detailed message be left on voicemail: yes     Reason for Call: Appointment Intake    Referring Provider Name: Kellie  Diagnosis and/or Symptoms: Bulkamid Injection    Pt would like an appt set up for a Bulkamid injection. Pt stated she would be available Feb 12 anytime, or Wednesdays after 1pm. Please call. Thank you!    Action Taken: Message routed to:  [facility] ([facility]    Travel Screening: Not Applicable

## 2024-02-12 NOTE — TELEPHONE ENCOUNTER
Writer called and left VM for patient to call back to schedule Bulkamid at Dr. Hopkins's next avilable Wednesday 1pm= 4/17/24  Awaiting call back.    Nighat UGARTE RN Urology 2/12/2024 9:30 AM

## 2024-02-15 NOTE — TELEPHONE ENCOUNTER
Writer called and left [facility] for patient to call back to schedule Bulkamid on 4/17/24  Awaiting call back.    Nighat UGARTE RN Urology 2/15/2024 11:38 AM

## 2024-02-21 NOTE — TELEPHONE ENCOUNTER
[facility] Call Center    Phone Message    May a detailed message be left on voicemail: yes     Reason for Call: Other: Patient is calling back to schedule, she is wondering if the procedure can be done on a Wednesday, she is available on 02/28, 03/13, 03/27, 04/10, 04/24.  Patient is able to arrive at 1pm or after, please call patient back. OK to schedule on these dates and leave her detail information. You can also reach out to patient via zappit.        Action Taken: Message routed to:  Other: [facility]    Travel Screening: Not Applicable

## 2024-03-06 ENCOUNTER — PATIENT OUTREACH (OUTPATIENT)
Dept: CARE COORDINATION | Facility: CLINIC | Age: 44
End: 2024-03-06
Payer: COMMERCIAL

## 2024-03-20 ENCOUNTER — PATIENT OUTREACH (OUTPATIENT)
Dept: CARE COORDINATION | Facility: CLINIC | Age: 44
End: 2024-03-20
Payer: COMMERCIAL

## 2024-04-10 ENCOUNTER — OFFICE VISIT (OUTPATIENT)
Dept: UROLOGY | Facility: CLINIC | Age: 44
End: 2024-04-10
Payer: COMMERCIAL

## 2024-04-10 VITALS
HEART RATE: 84 BPM | OXYGEN SATURATION: 99 % | DIASTOLIC BLOOD PRESSURE: 89 MMHG | SYSTOLIC BLOOD PRESSURE: 137 MMHG | TEMPERATURE: 98.5 F

## 2024-04-10 DIAGNOSIS — N39.3 STRESS INCONTINENCE, FEMALE: Primary | ICD-10-CM

## 2024-04-10 PROCEDURE — 51715 ENDOSCOPIC INJECTION/IMPLANT: CPT | Performed by: UROLOGY

## 2024-04-10 PROCEDURE — 99213 OFFICE O/P EST LOW 20 MIN: CPT | Mod: 25 | Performed by: UROLOGY

## 2024-04-10 RX ORDER — LEVOFLOXACIN 250 MG/1
250 TABLET, FILM COATED ORAL DAILY
Qty: 1 TABLET | Refills: 0 | Status: SHIPPED | OUTPATIENT
Start: 2024-04-10 | End: 2024-05-08

## 2024-04-10 RX ORDER — CIPROFLOXACIN 500 MG/1
500 TABLET, FILM COATED ORAL ONCE
Status: DISCONTINUED | OUTPATIENT
Start: 2024-04-10 | End: 2024-04-10

## 2024-04-10 NOTE — PROGRESS NOTES
Clinic Administered Transurethral Documentation    Medication: Bulkamid  Site: Urethra  Dose: 1ml  Lot #: [id number]  Exp: 08/31/2026   : Contura     Education documentation:     To improve patient experience and health outcomes, patient was educated on Bulkamid.     Teaching method:  Patient Received written materials handout on Post Bulkamid     Patient acknowledged understanding on the education.         Nighat UGARTE RN Urology 4/10/2024 2:59 PM

## 2024-04-10 NOTE — PROGRESS NOTES
2018Sling by me  10/11/23Negin Moncadasaritamauro: MARYELLEN >> to us    Here today for Bulkamid. Again reviewed technical aspects, risks, complications, etc; informed consent obtained.    Sterile prep and drape. Periurethral block with lido. Cysto followed by injection at two sites on pt left side. Good bleb development.    At this point pt had significant vasovagal response >> procedure aborted, cold water towel to head, trendy, vitals as recorded.    Further questioning with pt reveals that she had no breakfast and only a couple sips of peanut butter shake for lunch >> more sips of shake with prompt recovery.    Discussed situation with pt in detail. She now feels fine. We will arrange virtual visit in one week to discuss progress, likely schedule additional rx at surgery center. Precautions given in detail. Cipro x 1 tablet. Questions answered.        4/11/24  Called patient to check in. Left message on voicemail.    KENDRA

## 2024-04-17 ENCOUNTER — VIRTUAL VISIT (OUTPATIENT)
Dept: UROLOGY | Facility: CLINIC | Age: 44
End: 2024-04-17
Payer: COMMERCIAL

## 2024-04-17 ENCOUNTER — TELEPHONE (OUTPATIENT)
Dept: UROLOGY | Facility: CLINIC | Age: 44
End: 2024-04-17

## 2024-04-17 DIAGNOSIS — N39.3 SUI (STRESS URINARY INCONTINENCE, FEMALE): Primary | ICD-10-CM

## 2024-04-17 PROCEDURE — 99213 OFFICE O/P EST LOW 20 MIN: CPT | Mod: 95 | Performed by: UROLOGY

## 2024-04-17 NOTE — TELEPHONE ENCOUNTER
[facility] Call Center    Phone Message    May a detailed message be left on voicemail: yes     Reason for Call: Other: Pt returning call from clinic to get checked in for her VV appt today. Writer unable to get in contact with clinic. Please call pt     Action Taken: Message routed to:  Other: Uro    Travel Screening: Not Applicable

## 2024-04-17 NOTE — PROGRESS NOTES
F/up MARYELLEN      2018  Sling by me  10/11/23 Negin Doan: MARYELLEN >> to us  4/10/24 Bulkamid attempt; only one bleb >> pt with vasovagal      Now completely dry and pad-free, although doing only moderate activity; has yet to ride her horse vigorously.    Denies dysuria, gross hematuria, frequency and nocturia.      IMP:  MARYELLEN improved with (incomplete) Bulkamid      PLAN:  OK to do full activity  F/up with virtual visit in one month; if needs more Bulakmid would to at ASC under sedation  Total time spent in reviewing patient's previous records, discussion with patient and documentation: 20 minutes

## 2024-04-23 ENCOUNTER — MYC MEDICAL ADVICE (OUTPATIENT)
Dept: UROLOGY | Facility: CLINIC | Age: 44
End: 2024-04-23
Payer: COMMERCIAL

## 2024-05-07 SDOH — HEALTH STABILITY: PHYSICAL HEALTH: ON AVERAGE, HOW MANY MINUTES DO YOU ENGAGE IN EXERCISE AT THIS LEVEL?: 30 MIN

## 2024-05-07 SDOH — HEALTH STABILITY: PHYSICAL HEALTH: ON AVERAGE, HOW MANY DAYS PER WEEK DO YOU ENGAGE IN MODERATE TO STRENUOUS EXERCISE (LIKE A BRISK WALK)?: 1 DAY

## 2024-05-07 ASSESSMENT — PATIENT HEALTH QUESTIONNAIRE - PHQ9
10. IF YOU CHECKED OFF ANY PROBLEMS, HOW DIFFICULT HAVE THESE PROBLEMS MADE IT FOR YOU TO DO YOUR WORK, TAKE CARE OF THINGS AT HOME, OR GET ALONG WITH OTHER PEOPLE: SOMEWHAT DIFFICULT
SUM OF ALL RESPONSES TO PHQ QUESTIONS 1-9: 13
SUM OF ALL RESPONSES TO PHQ QUESTIONS 1-9: 13

## 2024-05-07 ASSESSMENT — SOCIAL DETERMINANTS OF HEALTH (SDOH): HOW OFTEN DO YOU GET TOGETHER WITH FRIENDS OR RELATIVES?: THREE TIMES A WEEK

## 2024-05-07 NOTE — COMMUNITY RESOURCES LIST (ENGLISH)
May 7, 2024           YOUR PERSONALIZED LIST OF SERVICES & PROGRAMS               Bill Payment Assistance      and [facility] - Energy Assistance Program  [address] [place] [address] (Distance: 12.5 miles)  Phone: [phone]  Language: English  Fee: Free      Helping Hand - Utility payment assistance  [address] [place] [address] (Distance: 10.8 miles)  Phone: [phone]) [phone]  Website: http://www.[facility] access hospitalSOPATec.org  Language: English  Fee: Free      - Dislocated Worker/Adult [employer] Employment Program  Phone: (453) [phone]  Email: doreene@[employer]  Website: https://[employer]/services/employment-services/dislocated-worker-program/  Language: English, Romanian  Hours: Mon 8:00 AM - 4:30 PM Tue 8:00 AM - 4:30 PM Wed 8:00 AM - 4:30 PM Thu 8:00 AM - 4:30 PM Fri 8:00 AM - 4:30 PM  Fee: Free  Accessibility: Ada accessible               IMPORTANT NUMBERS & WEBSITES        Emergency Services  911  .   [facility]  211 http://211unitedway.org  .   Poison Control  (280) 739-7680 http://mnpoison.org http://wisconsinpoison.org  .     Suicide and Crisis Lifeline  988 http://988lifeline.org  .   Childhelp [place] Child Abuse Hotline  638.653.9959 http://Childhelphotline.org   .   National Sexual Assault Hotline  (922) 161-3829 (HOPE) http://Rainn.org   .     National Runaway Safeline  (729) 594-7151 (RUNAWAY) http://1800runaway.org  .   Pregnancy & Postpartum Support  Call/text 365-553-6629  [place]: http://ppsupportmn.org  [place]: http://[employer].com/wi  .   Substance Abuse National Helpline ([facility])  198-961-HELP (0872) http://Findtreatment.gov   .                DISCLAIMER: These resources have been generated via the [employer] Platform. [employer] does not endorse any service providers mentioned in this resource list. [employer] does not guarantee that the services mentioned in this resource list will be available to you or will improve your health or wellness.    [facility]

## 2024-05-08 ENCOUNTER — OFFICE VISIT (OUTPATIENT)
Dept: FAMILY MEDICINE | Facility: CLINIC | Age: 44
End: 2024-05-08
Payer: COMMERCIAL

## 2024-05-08 VITALS
TEMPERATURE: 98.2 F | HEIGHT: 61 IN | SYSTOLIC BLOOD PRESSURE: 118 MMHG | RESPIRATION RATE: 12 BRPM | BODY MASS INDEX: 29.76 KG/M2 | OXYGEN SATURATION: 98 % | WEIGHT: 157.6 LBS | DIASTOLIC BLOOD PRESSURE: 80 MMHG | HEART RATE: 81 BPM

## 2024-05-08 DIAGNOSIS — Z00.00 ROUTINE GENERAL MEDICAL EXAMINATION AT A HEALTH CARE FACILITY: Primary | ICD-10-CM

## 2024-05-08 DIAGNOSIS — G89.29 CHRONIC MIDLINE LOW BACK PAIN WITHOUT SCIATICA: ICD-10-CM

## 2024-05-08 DIAGNOSIS — Z13.6 CARDIOVASCULAR SCREENING; LDL GOAL LESS THAN 100: ICD-10-CM

## 2024-05-08 DIAGNOSIS — M54.50 CHRONIC MIDLINE LOW BACK PAIN WITHOUT SCIATICA: ICD-10-CM

## 2024-05-08 DIAGNOSIS — E66.811 CLASS 1 OBESITY: ICD-10-CM

## 2024-05-08 DIAGNOSIS — Z12.11 SCREEN FOR COLON CANCER: ICD-10-CM

## 2024-05-08 LAB
ALBUMIN SERPL BCG-MCNC: 4.1 G/DL (ref 3.5–5.2)
ALP SERPL-CCNC: 73 U/L (ref 40–150)
ALT SERPL W P-5'-P-CCNC: 13 U/L (ref 0–50)
ANION GAP SERPL CALCULATED.3IONS-SCNC: 8 MMOL/L (ref 7–15)
AST SERPL W P-5'-P-CCNC: 11 U/L (ref 0–45)
BILIRUB SERPL-MCNC: 0.3 MG/DL
BUN SERPL-MCNC: 14.9 MG/DL (ref 6–20)
CALCIUM SERPL-MCNC: 9.2 MG/DL (ref 8.6–10)
CHLORIDE SERPL-SCNC: 106 MMOL/L (ref 98–107)
CHOLEST SERPL-MCNC: 143 MG/DL
CREAT SERPL-MCNC: 0.67 MG/DL (ref 0.51–0.95)
DEPRECATED HCO3 PLAS-SCNC: 23 MMOL/L (ref 22–29)
EGFRCR SERPLBLD CKD-EPI 2021: >90 ML/MIN/1.73M2
FASTING STATUS PATIENT QL REPORTED: NO
FASTING STATUS PATIENT QL REPORTED: NO
GLUCOSE SERPL-MCNC: 99 MG/DL (ref 70–99)
HDLC SERPL-MCNC: 50 MG/DL
LDLC SERPL CALC-MCNC: 73 MG/DL
NONHDLC SERPL-MCNC: 93 MG/DL
POTASSIUM SERPL-SCNC: 4.4 MMOL/L (ref 3.4–5.3)
PROT SERPL-MCNC: 6.9 G/DL (ref 6.4–8.3)
SODIUM SERPL-SCNC: 137 MMOL/L (ref 135–145)
TRIGL SERPL-MCNC: 100 MG/DL

## 2024-05-08 PROCEDURE — 99396 PREV VISIT EST AGE 40-64: CPT | Performed by: INTERNAL MEDICINE

## 2024-05-08 PROCEDURE — 36415 COLL VENOUS BLD VENIPUNCTURE: CPT | Performed by: INTERNAL MEDICINE

## 2024-05-08 PROCEDURE — 80061 LIPID PANEL: CPT | Performed by: INTERNAL MEDICINE

## 2024-05-08 PROCEDURE — 80053 COMPREHEN METABOLIC PANEL: CPT | Performed by: INTERNAL MEDICINE

## 2024-05-08 PROCEDURE — 99214 OFFICE O/P EST MOD 30 MIN: CPT | Mod: 25 | Performed by: INTERNAL MEDICINE

## 2024-05-08 RX ORDER — PHENTERMINE HYDROCHLORIDE 15 MG/1
15 CAPSULE ORAL 2 TIMES DAILY
Qty: 60 CAPSULE | Refills: 5 | Status: SHIPPED | OUTPATIENT
Start: 2024-05-08

## 2024-05-08 ASSESSMENT — PAIN SCALES - GENERAL: PAINLEVEL: MODERATE PAIN (5)

## 2024-05-08 NOTE — PATIENT INSTRUCTIONS
"Preventive Care Advice   This is general advice we often give to help people stay healthy. Your care team may have specific advice just for you. Please talk to your care team about your own preventive care needs.  Lifestyle  Exercise at least 150 minutes each week (30 minutes a day, 5 days a week).  Do muscle strengthening activities 2 days a week. These help control your weight and prevent disease.  No smoking.  Wear sunscreen to prevent skin cancer.  Have your home tested for radon every 2 to 5 years. Radon is a colorless, odorless gas that can harm your lungs. To learn more, go to www.health.[facility].mn. and search for \"Radon in Homes.\"  Keep guns unloaded and locked up in a safe place like a safe or gun vault, or, use a gun lock and hide the keys. Always lock away bullets separately. To learn more, visit ChipIn.[place].gov and search for \"safe gun storage.\"  Nutrition  Eat 5 or more servings of fruits and vegetables each day.  Try wheat bread, brown rice and whole grain pasta (instead of white bread, rice, and pasta).  Get enough calcium and vitamin D. Check the label on foods and aim for 100% of the RDA (recommended daily allowance).  Regular exams  Have a dental exam and cleaning every 6 months.  See your health care team every year to talk about:  Any changes in your health.  Any medicines your care team has prescribed.  Preventive care, family planning, and ways to prevent chronic diseases.  Shots (vaccines)   HPV shots (up to age 26), if you've never had them before.  Hepatitis B shots (up to age 59), if you've never had them before.  COVID-19 shot: Get this shot when it's due.  Flu shot: Get a flu shot every year.  Tetanus shot: Get a tetanus shot every 10 years.  Pneumococcal, hepatitis A, and RSV shots: Ask your care team if you need these based on your risk.  Shingles shot (for age 50 and up).  General health tests  Diabetes screening:  Starting at age 35, Get screened for diabetes at least every 3 years.  If " you are younger than age 35, ask your care team if you should be screened for diabetes.  Cholesterol test: At age 39, start having a cholesterol test every 5 years, or more often if advised.  Bone density scan (DEXA): At age 50, ask your care team if you should have this scan for osteoporosis (brittle bones).  Hepatitis C: Get tested at least once in your life.  Abdominal aortic aneurysm screening: Talk to your doctor about having this screening if you:  Have ever smoked; and  Are biologically male; and  Are between the ages of 65 and 75.  STIs (sexually transmitted infections)  Before age 24: Ask your care team if you should be screened for STIs.  After age 24: Get screened for STIs if you're at risk. You are at risk for STIs (including HIV) if:  You are sexually active with more than one person.  You don't use condoms every time.  You or a partner was diagnosed with a sexually transmitted infection.  If you are at risk for HIV, ask about PrEP medicine to prevent HIV.  Get tested for HIV at least once in your life, whether you are at risk for HIV or not.  Cancer screening tests  Cervical cancer screening: If you have a cervix, begin getting regular cervical cancer screening tests at age 21. Most people who have regular screenings with normal results can stop after age 65. Talk about this with your provider.  Breast cancer scan (mammogram): If you've ever had breasts, begin having regular mammograms starting at age 40. This is a scan to check for breast cancer.  Colon cancer screening: It is important to start screening for colon cancer at age 45.  Have a colonoscopy test every 10 years (or more often if you're at risk) Or, ask your provider about stool tests like a FIT test every year or Cologuard test every 3 years.  To learn more about your testing options, visit: www.Kiyon/054214.pdf.  For help making a decision, visit: trisha/qr55068.  Prostate cancer screening test: If you have a prostate and are age 55  to 69, ask your provider if you would benefit from a yearly prostate cancer screening test.  Lung cancer screening: If you are a current or former smoker age 50 to 80, ask your care team if ongoing lung cancer screenings are right for you.  For informational purposes only. Not to replace the advice of your health care provider. Copyright   2023 [employer]. All rights reserved. Clinically reviewed by the [facility] Transitions Program. Curvo 067921 - REV 04/24.    Learning About Stress  What is stress?     Stress is your body's response to a hard situation. Your body can have a physical, emotional, or mental response. Stress is a fact of life for most people, and it affects everyone differently. What causes stress for you may not be stressful for someone else.  A lot of things can cause stress. You may feel stress when you go on a job interview, take a test, or run a race. This kind of short-term stress is normal and even useful. It can help you if you need to work hard or react quickly. For example, stress can help you finish an important job on time.  Long-term stress is caused by ongoing stressful situations or events. Examples of long-term stress include long-term health problems, ongoing problems at work, or conflicts in your family. Long-term stress can harm your health.  How does stress affect your health?  When you are stressed, your body responds as though you are in danger. It makes hormones that speed up your heart, make you breathe faster, and give you a burst of energy. This is called the fight-or-flight stress response. If the stress is over quickly, your body goes back to normal and no harm is done.  But if stress happens too often or lasts too long, it can have bad effects. Long-term stress can make you more likely to get sick, and it can make symptoms of some diseases worse. If you tense up when you are stressed, you may develop neck, shoulder, or low back pain. Stress is  linked to high blood pressure and heart disease.  Stress also harms your emotional health. It can make you leone, tense, or depressed. Your relationships may suffer, and you may not do well at work or school.  What can you do to manage stress?  You can try these things to help manage stress:   Do something active. Exercise or activity can help reduce stress. Walking is a great way to get started. Even everyday activities such as housecleaning or yard work can help.  Try yoga or gregory chi. These techniques combine exercise and meditation. You may need some training at first to learn them.  Do something you enjoy. For example, listen to music or go to a movie. Practice your hobby or do volunteer work.  Meditate. This can help you relax, because you are not worrying about what happened before or what may happen in the future.  Do guided imagery. Imagine yourself in any setting that helps you feel calm. You can use online videos, books, or a teacher to guide you.  Do breathing exercises. For example:  From a standing position, bend forward from the waist with your knees slightly bent. Let your arms dangle close to the floor.  Breathe in slowly and deeply as you return to a standing position. Roll up slowly and lift your head last.  Hold your breath for just a few seconds in the standing position.  Breathe out slowly and bend forward from the waist.  Let your feelings out. Talk, laugh, cry, and express anger when you need to. Talking with supportive friends or family, a counselor, or a dylon leader about your feelings is a healthy way to relieve stress. Avoid discussing your feelings with people who make you feel worse.  Write. It may help to write about things that are bothering you. This helps you find out how much stress you feel and what is causing it. When you know this, you can find better ways to cope.  What can you do to prevent stress?  You might try some of these things to help prevent stress:  Manage your time.  "This helps you find time to do the things you want and need to do.  Get enough sleep. Your body recovers from the stresses of the day while you are sleeping.  Get support. Your family, friends, and community can make a difference in how you experience stress.  Limit your news feed. Avoid or limit time on social media or news that may make you feel stressed.  Do something active. Exercise or activity can help reduce stress. Walking is a great way to get started.  Where can you learn more?  Go to https://www.mgMEDIA.net/patiented  Enter N032 in the search box to learn more about \"Learning About Stress.\"  Current as of: October 24, 2023               Content Version: 14.0    9172-9908 [employer].   Care instructions adapted under license by your healthcare professional. If you have questions about a medical condition or this instruction, always ask your healthcare professional. [employer] disclaims any warranty or liability for your use of this information.      Learning About Depression Screening  What is depression screening?  Depression screening is a way to see if you have depression symptoms. It may be done by a doctor or counselor. It's often part of a routine checkup. That's because your mental health is just as important as your physical health.  Depression is a mental health condition that affects how you feel, think, and act. You may:  Have less energy.  Lose interest in your daily activities.  Feel sad and grouchy for a long time.  Depression is very common. It affects people of all ages.  Many things can lead to depression. Some people become depressed after they have a stroke or find out they have a major illness like cancer or heart disease. The death of a loved one or a breakup may lead to depression. It can run in families. Most experts believe that a combination of inherited genes and stressful life events can cause it.  What happens during screening?  You may be asked to " "fill out a form about your depression symptoms. You and the doctor will discuss your answers. The doctor may ask you more questions to learn more about how you think, act, and feel.  What happens after screening?  If you have symptoms of depression, your doctor will talk to you about your options.  Doctors usually treat depression with medicines or counseling. Often, combining the two works best. Many people don't get help because they think that they'll get over the depression on their own. But people with depression may not get better unless they get treatment.  The cause of depression is not well understood. There may be many factors involved. But if you have depression, it's not your fault.  A serious symptom of depression is thinking about death or suicide. If you or someone you care about talks about this or about feeling hopeless, get help right away.  It's important to know that depression can be treated. Medicine, counseling, and self-care may help.  Where can you learn more?  Go to https://www.[employer].net/patiented  Enter T185 in the search box to learn more about \"Learning About Depression Screening.\"  Current as of: June 24, 2023               Content Version: 14.0    8376-6825 [employer].   Care instructions adapted under license by your healthcare professional. If you have questions about a medical condition or this instruction, always ask your healthcare professional. [employer] disclaims any warranty or liability for your use of this information.      Procedure reaction  Sounds like vasovagal reaction, not an allergic reaction to lidocaine  Discussed ways to prevent -hydrate, avoid caffeine, have a good meal, informing provider    Back pain  Recommend physical therapy   Would consider imaging if symptoms worsen (sciatica type symptoms) or if physical therapy is not helpful    Weight  Ok to split phentermine to 15 mg twice daily; take 2nd dose midday, at least 8 hrs " before bedtime  If you have insomnia with this or want to change back to 30 mg once daily, let me know via My Chart  Blood work today    Health Care Maintenance  Return FIT test

## 2024-05-08 NOTE — LETTER
May 13, 2024      Cecile Lim  [address] [address] [place] [id number]        Dear ,    We are writing to inform you of your test results.    Kidney and liver function, electrolytes, cholesterol are normal     Resulted Orders   Lipid panel reflex to direct LDL Fasting   Result Value Ref Range    Cholesterol 143 <200 mg/dL    Triglycerides 100 <150 mg/dL    Direct Measure HDL 50 >=50 mg/dL    LDL Cholesterol Calculated 73 <=100 mg/dL    Non HDL Cholesterol 93 <130 mg/dL    Patient Fasting > 8hrs? No     Narrative    Cholesterol  Desirable:  <200 mg/dL    Triglycerides  Normal:  Less than 150 mg/dL  Borderline High:  150-199 mg/dL  High:  200-499 mg/dL  Very High:  Greater than or equal to 500 mg/dL    Direct Measure HDL  Female:  Greater than or equal to 50 mg/dL   Male:  Greater than or equal to 40 mg/dL    LDL Cholesterol  Desirable:  <100mg/dL  Above Desirable:  100-129 mg/dL   Borderline High:  130-159 mg/dL   High:  160-189 mg/dL   Very High:  >= 190 mg/dL    Non HDL Cholesterol  Desirable:  130 mg/dL  Above Desirable:  130-159 mg/dL  Borderline High:  160-189 mg/dL  High:  190-219 mg/dL  Very High:  Greater than or equal to 220 mg/dL       If you have any questions or concerns, please call the clinic at the number listed above.       Sincerely,      Fawn Talley, DO

## 2024-05-08 NOTE — PROGRESS NOTES
"Preventive Care Visit  [facility]  Fawn Talley DO, Internal Medicine  May 8, 2024      Assessment & Plan     Routine general medical examination at a health care facility    Chronic midline low back pain without sciatica  Exam and symptoms are consistent with mechanical low back pain.  Start physical therapy.  Discussed modified activity and core muscle strengthening.  - Physical Therapy  Referral; Future  - OFFICE/OUTPT VISIT,EST,LEVL IV    Class 1 obesity  Split phentermine;  check lab  - Comprehensive metabolic panel (BMP + Alb, Alk Phos, ALT, AST, Total. Bili, TP); Future  - phentermine 15 MG capsule; Take 1 capsule (15 mg) by mouth 2 times daily  - OFFICE/OUTPT VISIT,EST,LEVL IV    CARDIOVASCULAR SCREENING; LDL GOAL LESS THAN 100  Due   - Lipid panel reflex to direct LDL Fasting; Future    Screen for colon cancer  Due   - Fecal colorectal cancer screen (FIT); Future    Patient has been advised of split billing requirements and indicates understanding: Yes        BMI  Estimated body mass index is 30.14 kg/m  as calculated from the following:    Height as of this encounter: 1.54 m (5' 0.63\").    Weight as of this encounter: 71.5 kg (157 lb 9.6 oz).   Weight management plan: phentermine above    Counseling  Appropriate preventive services were discussed with this patient, including applicable screening as appropriate for fall prevention, nutrition, physical activity, Tobacco-use cessation, weight loss and cognition.  Checklist reviewing preventive services available has been given to the patient.  Reviewed patient's diet, addressing concerns and/or questions.   She is at risk for lack of exercise and has been provided with information to increase physical activity for the benefit of her well-being.   She is at risk for psychosocial distress and has been provided with information to reduce risk.   The patient's PHQ-9 score is consistent with moderate depression. She was " provided with information regarding depression.           Subjective   Cecile is a 43 year old, presenting for the following:  Physical, Hypertension, and Health Maintenance (No shots)        5/8/2024     6:42 AM   Additional Questions   Roomed by ryann   Accompanied by self         5/8/2024     6:42 AM   Patient Reported Additional Medications   Patient reports taking the following new medications none        Health Care Directive  Patient does not have a Health Care Directive or Living Will: - not discussed    HPI      Chief Complaint   Patient presents with    Physical    Hypertension    Health Maintenance     No shots     Back pain  --low back pain, onset ~ 1 yr  --started after lifting a snowblower; worsened after fall after horse  --chiro has been somewhat helpful; chiro did xray which was negative   --wonders if she needs MRI because mom has severe DJD  --using Ibu daily  --no radicular pain, thoracic or cervical pain  --no days w/out pain  --pain is worse with menses  --sometimes hard to fall asleep due to pain, but pain is not waking up  --pain causes frequent position changes at work, home, etc   --no leg weakness, numbness, tingling in legs  --follows with Urology for stress urinary incontinence - see below    Procedure reaction  --urology told her to follow-up with PCP about 'reaction' to procedure  --she was given lidocaine for Bulkamid;  she reports she felt palpitations, 'out of body' type reaction - heart racing, inability to talk, but was conscious.  Had similar reactions to dental work    Weight  --continues on phentermine   --wt is leveled off if not up a bit  --wonders about taking phentermine 15 mg BID because she feels med wears off midday    Wt Readings from Last 5 Encounters:   05/08/24 71.5 kg (157 lb 9.6 oz)   11/01/23 69.9 kg (154 lb)   03/31/23 66.6 kg (146 lb 14.4 oz)   06/17/22 70.8 kg (156 lb)   02/09/22 69.4 kg (153 lb)     Estimated body mass index is 30.14 kg/m  as calculated from  "the following:    Height as of this encounter: 1.54 m (5' 0.63\").    Weight as of this encounter: 71.5 kg (157 lb 9.6 oz).      Hypertension Follow-up    Do you check your blood pressure regularly outside of the clinic? Yes sometimes   Are you following a low salt diet? No  Are your blood pressures ever more than 140 on the top number (systolic) OR more than 90 on the bottom number (diastolic), for example 140/90? No        5/7/2024   General Health   How would you rate your overall physical health? Good   Feel stress (tense, anxious, or unable to sleep) Only a little   (!) STRESS CONCERN      5/7/2024   Nutrition   Three or more servings of calcium each day? (!) NO   Diet: Regular (no restrictions)   How many servings of fruit and vegetables per day? (!) 2-3   How many sweetened beverages each day? (!) 3         5/7/2024   Exercise   Days per week of moderate/strenous exercise 1 day   Average minutes spent exercising at this level 30 min   (!) EXERCISE CONCERN      5/7/2024   Social Factors   Frequency of gathering with friends or relatives Three times a week   Worry food won't last until get money to buy more No   Food not last or not have enough money for food? No   Do you have housing?  Yes   Are you worried about losing your housing? No   Lack of transportation? No   Unable to get utilities (heat,electricity)? Yes   Want help with housing or utility concern? No   (!) FINANCIAL RESOURCE STRAIN CONCERN      5/7/2024   Dental   Dentist two times every year? Yes         5/7/2024   TB Screening   Were you born outside of the [place]? No       Today's PHQ-9 Score:       5/7/2024     3:25 PM   PHQ-9 SCORE   PHQ-9 Total Score MyChart 13 (Moderate depression)   PHQ-9 Total Score 13         5/7/2024   Substance Use   Alcohol more than 3/day or more than 7/wk No   Do you use any other substances recreationally? No     Social History     Tobacco Use    Smoking status: Never    Smokeless tobacco: Never   Vaping Use    Vaping " status: Never Used   Substance Use Topics    Alcohol use: Yes     Alcohol/week: 0.0 standard drinks of alcohol     Comment: Rare. Maybe one drink a month    Drug use: No           2/9/2022   LAST FHS-7 RESULTS   1st degree relative breast or ovarian cancer No   Any relative bilateral breast cancer No   Any male have breast cancer No   Any ONE woman have BOTH breast AND ovarian cancer No   Any woman with breast cancer before 50yrs No   2 or more relatives with breast AND/OR ovarian cancer No   2 or more relatives with breast AND/OR bowel cancer No        Mammogram Screening - Mammogram every 1-2 years updated in Health Maintenance based on mutual decision making        5/7/2024   STI Screening   New sexual partner(s) since last STI/HIV test? No     History of abnormal Pap smear: NO - age 30-65 PAP every 5 years with negative HPV co-testing recommended        Latest Ref Rng & Units 2/9/2022     9:46 AM 1/5/2017     8:13 AM 1/17/2013     4:39 PM   PAP / HPV   PAP  Negative for Intraepithelial Lesion or Malignancy (NILM)      PAP (Historical)   NIL  NIL    HPV 16 DNA Negative Negative  Negative     HPV 18 DNA Negative Negative  Negative     Other HR HPV Negative Negative  Negative       ASCVD Risk   The 10-year ASCVD risk score (Oumou HART, et al., 2019) is: 0.6%    Values used to calculate the score:      Age: 43 years      Sex: Female      Is Non- : No      Diabetic: No      Tobacco smoker: No      Systolic Blood Pressure: 118 mmHg      Is BP treated: Yes      HDL Cholesterol: 56 mg/dL      Total Cholesterol: 161 mg/dL        5/7/2024   Contraception/Family Planning   Questions about contraception or family planning No        Reviewed and updated as needed this visit by Provider                    Current Outpatient Medications   Medication Sig Dispense Refill    APPLE CIDER VINFAVIOR PO       lisinopril (ZESTRIL) 20 MG tablet Take 1 tablet (20 mg) by mouth daily 90 tablet 3     "phentermine (ADIPEX-P) 30 MG capsule Take 1 capsule (30 mg) by mouth every morning 90 capsule 1    topiramate (TOPAMAX) 25 MG tablet Take 1 tablet (25 mg) by mouth daily 90 tablet 3    venlafaxine (EFFEXOR XR) 150 MG 24 hr capsule Take 1 capsule (150 mg) by mouth daily 90 capsule 3    levofloxacin (LEVAQUIN) 250 MG tablet Take 1 tablet (250 mg) by mouth daily Take after procedure today 4.10.24 1 tablet 0         Review of Systems  Constitutional, neuro, ENT, endocrine, pulmonary, cardiac, gastrointestinal, genitourinary, musculoskeletal, integument and psychiatric systems are negative, except as otherwise noted.     Objective    Exam  /80 (BP Location: Right arm, Patient Position: Sitting, Cuff Size: Adult Regular)   Pulse 81   Temp 98.2  F (36.8  C) (Tympanic)   Resp 12   Ht 1.54 m (5' 0.63\")   Wt 71.5 kg (157 lb 9.6 oz)   LMP 04/10/2024 (Approximate)   SpO2 98%   BMI 30.14 kg/m     Estimated body mass index is 30.14 kg/m  as calculated from the following:    Height as of this encounter: 1.54 m (5' 0.63\").    Weight as of this encounter: 71.5 kg (157 lb 9.6 oz).    Physical Exam  GENERAL: alert and no distress  EYES: Eyes grossly normal to inspection, PERRL and conjunctivae and sclerae normal  HENT: ear canals and TM's normal, nose and mouth without ulcers or lesions  NECK: no adenopathy, no asymmetry, masses, or scars  RESP: lungs clear to auscultation - no rales, rhonchi or wheezes  CV: regular rate and rhythm, normal S1 S2, no S3 or S4, no murmur, click or rub, no peripheral edema  ABDOMEN: soft, nontender, no hepatosplenomegaly, no masses and bowel sounds normal  MS: no gross musculoskeletal defects noted, no edema  SKIN: no suspicious lesions or rashes  Comprehensive back pain exam:  Tenderness of L2-4 midline, Range of motion not limited by pain, Lower extremity strength functional and equal on both sides, Lower extremity reflexes within normal limits bilaterally, and Straight leg raise negative " bilaterally  PSYCH: mentation appears normal, affect normal/bright        Signed Electronically by: Fawn Talley DO

## 2024-07-07 ENCOUNTER — HOSPITAL ENCOUNTER (EMERGENCY)
Facility: CLINIC | Age: 44
Discharge: HOME | End: 2024-07-07
Attending: NURSE PRACTITIONER | Admitting: NURSE PRACTITIONER
Payer: COMMERCIAL

## 2024-07-07 VITALS
OXYGEN SATURATION: 98 % | RESPIRATION RATE: 16 BRPM | TEMPERATURE: 97.8 F | SYSTOLIC BLOOD PRESSURE: 144 MMHG | HEART RATE: 72 BPM | DIASTOLIC BLOOD PRESSURE: 90 MMHG

## 2024-07-07 DIAGNOSIS — L03.011 PARONYCHIA OF FINGER OF RIGHT HAND: ICD-10-CM

## 2024-07-07 PROCEDURE — G0463 HOSPITAL OUTPT CLINIC VISIT: HCPCS | Mod: 25 | Performed by: NURSE PRACTITIONER

## 2024-07-07 PROCEDURE — 10140 I&D HMTMA SEROMA/FLUID COLLJ: CPT | Mod: F8 | Performed by: NURSE PRACTITIONER

## 2024-07-07 PROCEDURE — 99213 OFFICE O/P EST LOW 20 MIN: CPT | Mod: 25 | Performed by: NURSE PRACTITIONER

## 2024-07-07 PROCEDURE — 26010 DRAINAGE OF FINGER ABSCESS: CPT | Mod: F8 | Performed by: NURSE PRACTITIONER

## 2024-07-07 RX ORDER — CEPHALEXIN 500 MG/1
500 CAPSULE ORAL 4 TIMES DAILY
Qty: 28 CAPSULE | Refills: 0 | Status: SHIPPED | OUTPATIENT
Start: 2024-07-07 | End: 2024-07-14

## 2024-07-07 ASSESSMENT — ACTIVITIES OF DAILY LIVING (ADL): ADLS_ACUITY_SCORE: 35

## 2024-07-07 NOTE — ED PROVIDER NOTES
ED Provider Note  [facility]      History     Chief Complaint   Patient presents with    Wound Check     Fourth finger on right hand     HPI  Cecile Lim is a 43 year old female who presents with fingernail pain on her right hand ring finger.  Reports that she is fingernail biter has had previous nail infections related to this.  At this time reports redness and pain around the fingernail fold and has an area of tissue growth extending out of the nail fold after pulling on a hangnail several weeks ago.  Denies any fever or chills.            Allergies:  No Known Allergies    Problem List:    Patient Active Problem List    Diagnosis Date Noted    Encounter for surveillance of contraceptives 03/10/2016     Priority: Medium      has had vasectomy      Lipoma of back 11/12/2015     Priority: Medium    Major depressive disorder, recurrent episode, in partial remission (HCC) 12/23/2014     Priority: Medium    Essential hypertension 09/18/2012     Priority: Medium    Stress incontinence, female 02/27/2012     Priority: Medium    CARDIOVASCULAR SCREENING; LDL GOAL LESS THAN 160 10/31/2010     Priority: Medium    Generalized anxiety disorder 10/20/2010     Priority: Medium    Ureteral stone 07/09/2009     Priority: Medium        Past Medical History:    Past Medical History:   Diagnosis Date    LAURA (generalised anxiety disorder)     HTN (hypertension)     Suicide attempt (H) 11/2014       Past Surgical History:    Past Surgical History:   Procedure Laterality Date    EXCISE MASS TRUNK N/A 11/29/2018    Procedure: Excision of Left Back Mass;  Surgeon: Rolando Claros DO;  Location: [facility] OR    EXCISE MASS TRUNK N/A 12/7/2018    Procedure: Evacuation of back hematoma with placement of drain;  Surgeon: Rolando Claros DO;  Location: [facility] OR    NO HISTORY OF SURGERY  1/2007    SLING TRANSVAGINAL N/A 4/27/2018    Procedure: SLING TRANSVAGINAL;  Pubovaginal sling ([place]);   Surgeon: Douglas Hopkins MD;  Location: [facility] OR    SOFT TISSUE SURGERY         Family History:    Family History   Problem Relation Age of Onset    Hypertension Mother     Musculoskeletal Disorder Mother         degenerative disc disease    Thyroid Disease Mother     Depression Mother     Cerebrovascular Disease Mother     Heart Disease Mother     Hypertension Father     Cerebrovascular Disease Father         age 40    Hypertension Maternal Grandfather     Cardiovascular Maternal Grandfather     Cancer Paternal Grandfather         lung    Hypertension Brother     Bartter's syndrome Brother     Asthma No family hx of     C.A.D. No family hx of     Diabetes No family hx of     Breast Cancer No family hx of     Cancer - colorectal No family hx of     Prostate Cancer No family hx of        Social History:  Marital Status:   [2]  Social History     Tobacco Use    Smoking status: Never    Smokeless tobacco: Never   Vaping Use    Vaping status: Never Used   Substance Use Topics    Alcohol use: Yes     Alcohol/week: 0.0 standard drinks of alcohol     Comment: Rare. Maybe one drink a month    Drug use: No        Medications:    cephALEXin (KEFLEX) 500 MG capsule  APPLE CIDER VINEGAR PO  lisinopril (ZESTRIL) 20 MG tablet  phentermine 15 MG capsule  topiramate (TOPAMAX) 25 MG tablet  venlafaxine (EFFEXOR XR) 150 MG 24 hr capsule          Review of Systems  A medically appropriate review of systems was performed with pertinent positives and negatives noted in the HPI, and all other systems negative.    Physical Exam   Patient Vitals for the past 24 hrs:   BP Temp Pulse Resp SpO2   07/07/24 1149 (!) 144/90 97.8  F (36.6  C) 72 16 98 %          Physical Exam  Musculoskeletal:        Hands:       Comments: Paronychia present of right hand ring finger nail.   General: alert and in no acute distress on arrival  Head: atraumatic, normocephalic  Lungs:  nonlabored  CV:  extremities warm and perfused  Skin: no rashes, no  diaphoresis and skin color normal  Neuro: Patient awake, alert, speech is fluent, no focal deficits  Psychiatric: affect/mood normal, appropriate historian.      ED Course                 [facility]    PROCEDURE: -Incision/Drainage    Date/Time: 7/7/2024 6:48 PM    Performed by: Charissa Hancock APRN CNP  Authorized by: Charissa Hancock APRN CNP    Risks, benefits and alternatives discussed.      LOCATION:      Type:  Abscess    Size:  1mm    Location:  Upper extremity    Upper extremity location:  Finger    Finger location:  R ring finger    PRE-PROCEDURE DETAILS:     Skin preparation:  Antiseptic wash    PROCEDURE TYPE:     Complexity:  Simple    ANESTHESIA (see MAR for exact dosages):     Anesthesia method:  None    PROCEDURE DETAILS:     Needle aspiration: yes      Needle size:  20 G    Incision types:  Stab incision    Incision depth:  Dermal    Drainage amount:  Scant    Wound treatment:  Wound left open    Packing materials:  None    PROCEDURE    Patient Tolerance:  Patient tolerated the procedure well with no immediate complications                    No results found for this or any previous visit (from the past 24 hour(s)).    MEDICATIONS GIVEN IN THE EMERGENCY DEPARTMENT:  Medications - No data to display             Assessments & Plan (with Medical Decision Making)  43 year old female who presents to the Urgent Care for evaluation of paronychia of finger of right hand.  Needle aspiration of paronychia without purulent material present.  Due to the amount of redness around the finger will order Keflex 4 times daily for 7 days.  Advised to keep clean, avoid biting nails.  Return if symptoms or not improving despite recommended treatment plan.       I have reviewed the nursing notes.    I have reviewed the findings, diagnosis, plan and need for follow up with the patient.        NEW PRESCRIPTIONS STARTED AT TODAY'S ER VISIT  Discharge Medication List as of 7/7/2024 12:16 PM         START taking these medications    Details   cephALEXin (KEFLEX) 500 MG capsule Take 1 capsule (500 mg) by mouth 4 times daily for 7 days, Disp-28 capsule, R-0, E-Prescribe             Final diagnoses:   Paronychia of finger of right hand       7/7/2024   [facility] EMERGENCY DEPT       Charissa Hancock APRN CNP  07/07/24 [id number]

## 2024-07-10 ENCOUNTER — TELEPHONE (OUTPATIENT)
Dept: DERMATOLOGY | Facility: CLINIC | Age: 44
End: 2024-07-10
Payer: COMMERCIAL

## 2024-07-10 NOTE — TELEPHONE ENCOUNTER
At ED visit on 7/7, an I&D was done, patient was given antibiotics and advised not to bite her fingernails.     There is no Derm referral from the ED. If this is an ED follow up, patient can schedule a follow up with her PCP.

## 2024-07-10 NOTE — TELEPHONE ENCOUNTER
[facility] Call Center    Phone Message    May a detailed message be left on voicemail: yes     Reason for Call: Appointment Intake    Referring Provider Name: Charissa Hancock APRN CNP - ED in [facility]     Diagnosis and/or Symptoms: Paronychia of finger, pt states it's swollen and tender, very uncomfortable    Action Taken: Message routed to:  Other: [facility] DERM    Travel Screening: Not Applicable     Date of Service:

## 2024-07-15 ENCOUNTER — OFFICE VISIT (OUTPATIENT)
Dept: FAMILY MEDICINE | Facility: CLINIC | Age: 44
End: 2024-07-15
Payer: COMMERCIAL

## 2024-07-15 VITALS
SYSTOLIC BLOOD PRESSURE: 116 MMHG | HEIGHT: 61 IN | BODY MASS INDEX: 30.02 KG/M2 | TEMPERATURE: 99.1 F | OXYGEN SATURATION: 97 % | WEIGHT: 159 LBS | HEART RATE: 62 BPM | DIASTOLIC BLOOD PRESSURE: 80 MMHG | RESPIRATION RATE: 16 BRPM

## 2024-07-15 DIAGNOSIS — L03.011 PARONYCHIA OF FINGER OF RIGHT HAND: Primary | ICD-10-CM

## 2024-07-15 PROCEDURE — 99213 OFFICE O/P EST LOW 20 MIN: CPT | Performed by: NURSE PRACTITIONER

## 2024-07-15 ASSESSMENT — PAIN SCALES - GENERAL: PAINLEVEL: MILD PAIN (2)

## 2024-07-15 ASSESSMENT — PATIENT HEALTH QUESTIONNAIRE - PHQ9
SUM OF ALL RESPONSES TO PHQ QUESTIONS 1-9: 6
10. IF YOU CHECKED OFF ANY PROBLEMS, HOW DIFFICULT HAVE THESE PROBLEMS MADE IT FOR YOU TO DO YOUR WORK, TAKE CARE OF THINGS AT HOME, OR GET ALONG WITH OTHER PEOPLE: NOT DIFFICULT AT ALL
SUM OF ALL RESPONSES TO PHQ QUESTIONS 1-9: 6

## 2024-07-15 NOTE — PROGRESS NOTES
Assessment & Plan     Paronychia of finger of right hand  Infection treated with antibiotics.  Patient reports the area is  when bumped.  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  Patient has a nervous habit of chewing on her cuticles and feels like this was likely the start of the infection.  No further antibiotics needed at this time.  Patient was instructed to apply bacitracin to the area  twice per day.  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  Return criteria given.    The risks, benefits and treatment options of prescribed medications or other treatments have been discussed with the patient. The patient verbalized their understanding and should call or follow up if no improvement or if they develop further problems.  Marina NOVA Landin is a 43 year old, presenting for the following health issues:  Finger (Right Ring Finger Infection Follow Up )        7/15/2024     8:40 AM   Additional Questions   Roomed by Luis M ADAME CMA   Accompanied by self     History of Present Illness       Reason for visit:  Right Ring Finger Infection Follow Up    She eats 2-3 servings of fruits and vegetables daily.She consumes 3 sweetened beverage(s) daily.She exercises with enough effort to increase her heart rate 60 or more minutes per day.  She exercises with enough effort to increase her heart rate 3 or less days per week.   She is taking medications regularly.         Concern - Right Ring Finger   Onset: x 2 weeks   Description: still swollen and tender near nail   Redness is gone  She feels like the infection was treated  Intensity: mild  Progression of Symptoms:  same  Accompanying Signs & Symptoms: tender, swelling , can bleed at times   Previous history of similar problem:  "none   Precipitating factors:        Worsened by: bumping it   Alleviating factors:        Improved by: Cephalexin   Therapies tried and outcome: Cephalexin - helped some, but area is  and she feels like \"something needs to be cut out\"         Review of Systems  Constitutional, HEENT, cardiovascular, pulmonary, gi and gu systems are negative, except as otherwise noted.      Objective    /80 (BP Location: Right arm, Patient Position: Sitting, Cuff Size: Adult Regular)   Pulse 62   Temp 99.1  F (37.3  C) (Tympanic)   Resp 16   Ht 1.543 m (5' 0.75\")   Wt 72.1 kg (159 lb)   LMP  (LMP Unknown)   SpO2 97%   BMI 30.29 kg/m    Body mass index is 30.29 kg/m .  Physical Exam   GENERAL: alert and no distress  SKIN: right hand, 4th finger -2 mm scabbed over area on the lateral edge of the nail.  No erythema or edema.  Mild tenderness to touch over the lesion.            Signed Electronically by: JOAQUIN Yee CNP    "

## 2024-07-20 ENCOUNTER — LAB (OUTPATIENT)
Dept: LAB | Facility: CLINIC | Age: 44
End: 2024-07-20
Payer: COMMERCIAL

## 2024-07-20 DIAGNOSIS — Z12.11 SCREEN FOR COLON CANCER: ICD-10-CM

## 2024-07-20 NOTE — LETTER
August 1, 2024      Cecile Ramírez Carina  [address] [address] [place] [id number]        Dear ,    We are writing to inform you of your test results.    Negative for colon cancer, repeat test in 1 year.    Resulted Orders   Fecal colorectal cancer screen (FIT)   Result Value Ref Range    Occult Blood Screen FIT Negative Negative       If you have any questions or concerns, please call the clinic at the number listed above.       Sincerely,      Fawn Talley, DO

## 2024-07-28 PROCEDURE — 82274 ASSAY TEST FOR BLOOD FECAL: CPT | Performed by: INTERNAL MEDICINE

## 2024-07-31 ENCOUNTER — HOSPITAL ENCOUNTER (EMERGENCY)
Facility: CLINIC | Age: 44
Discharge: HOME | End: 2024-07-31
Attending: PHYSICIAN ASSISTANT | Admitting: PHYSICIAN ASSISTANT
Payer: COMMERCIAL

## 2024-07-31 VITALS
SYSTOLIC BLOOD PRESSURE: 129 MMHG | DIASTOLIC BLOOD PRESSURE: 82 MMHG | TEMPERATURE: 98.2 F | HEART RATE: 75 BPM | RESPIRATION RATE: 17 BRPM | OXYGEN SATURATION: 99 %

## 2024-07-31 DIAGNOSIS — R39.9 LOWER URINARY TRACT SYMPTOMS: ICD-10-CM

## 2024-07-31 DIAGNOSIS — R82.90 ABNORMAL URINALYSIS: ICD-10-CM

## 2024-07-31 LAB
ALBUMIN UR-MCNC: NEGATIVE MG/DL
AMORPH CRY #/AREA URNS HPF: ABNORMAL /HPF
APPEARANCE UR: ABNORMAL
BACTERIA #/AREA URNS HPF: ABNORMAL /HPF
BILIRUB UR QL STRIP: NEGATIVE
COLOR UR AUTO: YELLOW
GLUCOSE UR STRIP-MCNC: NEGATIVE MG/DL
HGB UR QL STRIP: NEGATIVE
KETONES UR STRIP-MCNC: NEGATIVE MG/DL
LEUKOCYTE ESTERASE UR QL STRIP: ABNORMAL
MUCOUS THREADS #/AREA URNS LPF: PRESENT /LPF
NITRATE UR QL: NEGATIVE
PH UR STRIP: 7 [PH] (ref 5–7)
RBC URINE: 3 /HPF
SP GR UR STRIP: 1.02 (ref 1–1.03)
SQUAMOUS EPITHELIAL: 7 /HPF
UROBILINOGEN UR STRIP-MCNC: NORMAL MG/DL
WBC URINE: 5 /HPF

## 2024-07-31 PROCEDURE — 99213 OFFICE O/P EST LOW 20 MIN: CPT | Performed by: PHYSICIAN ASSISTANT

## 2024-07-31 PROCEDURE — G0463 HOSPITAL OUTPT CLINIC VISIT: HCPCS | Performed by: PHYSICIAN ASSISTANT

## 2024-07-31 PROCEDURE — 87086 URINE CULTURE/COLONY COUNT: CPT | Performed by: PHYSICIAN ASSISTANT

## 2024-07-31 PROCEDURE — 81001 URINALYSIS AUTO W/SCOPE: CPT | Performed by: PHYSICIAN ASSISTANT

## 2024-07-31 RX ORDER — SULFAMETHOXAZOLE/TRIMETHOPRIM 800-160 MG
1 TABLET ORAL 2 TIMES DAILY
Qty: 6 TABLET | Refills: 0 | Status: SHIPPED | OUTPATIENT
Start: 2024-07-31 | End: 2024-08-03

## 2024-07-31 ASSESSMENT — ENCOUNTER SYMPTOMS
DYSURIA: 1
FREQUENCY: 1
BACK PAIN: 1
FEVER: 0
HEMATURIA: 0
GASTROINTESTINAL NEGATIVE: 1
CONSTITUTIONAL NEGATIVE: 1

## 2024-07-31 ASSESSMENT — COLUMBIA-SUICIDE SEVERITY RATING SCALE - C-SSRS
6. HAVE YOU EVER DONE ANYTHING, STARTED TO DO ANYTHING, OR PREPARED TO DO ANYTHING TO END YOUR LIFE?: NO
1. IN THE PAST MONTH, HAVE YOU WISHED YOU WERE DEAD OR WISHED YOU COULD GO TO SLEEP AND NOT WAKE UP?: NO
2. HAVE YOU ACTUALLY HAD ANY THOUGHTS OF KILLING YOURSELF IN THE PAST MONTH?: NO

## 2024-07-31 ASSESSMENT — ACTIVITIES OF DAILY LIVING (ADL): ADLS_ACUITY_SCORE: 35

## 2024-07-31 NOTE — ED PROVIDER NOTES
History     Chief Complaint   Patient presents with    Urinary Frequency     HPI  Cecile Lim is a 43 year old female who presents to Urgent Care with complaints of dysuria, urinary frequency and urgency, pelvic pressure, and diffuse low back pain.  Symptoms have been present for the past 2 days.  Denies history of frequent recurrent UTIs.  Denies fevers, chills, nausea, vomiting, diarrhea, abdominal pain,  flank pain, or hematuria.      Allergies:  No Known Allergies    Problem List:    Patient Active Problem List    Diagnosis Date Noted    Encounter for surveillance of contraceptives 03/10/2016     Priority: Medium      has had vasectomy      Lipoma of back 11/12/2015     Priority: Medium    Major depressive disorder, recurrent episode, in partial remission (HCC) 12/23/2014     Priority: Medium    Essential hypertension 09/18/2012     Priority: Medium    Stress incontinence, female 02/27/2012     Priority: Medium    CARDIOVASCULAR SCREENING; LDL GOAL LESS THAN 160 10/31/2010     Priority: Medium    Generalized anxiety disorder 10/20/2010     Priority: Medium    Ureteral stone 07/09/2009     Priority: Medium        Past Medical History:    Past Medical History:   Diagnosis Date    LAURA (generalised anxiety disorder)     HTN (hypertension)     Suicide attempt (H) 11/2014       Past Surgical History:    Past Surgical History:   Procedure Laterality Date    EXCISE MASS TRUNK N/A 11/29/2018    Procedure: Excision of Left Back Mass;  Surgeon: Rolando Claros DO;  Location: [facility] OR    EXCISE MASS TRUNK N/A 12/7/2018    Procedure: Evacuation of back hematoma with placement of drain;  Surgeon: Rolando Claros DO;  Location: [facility]    NO HISTORY OF SURGERY  1/2007    SLING TRANSVAGINAL N/A 4/27/2018    Procedure: SLING TRANSVAGINAL;  Pubovaginal sling ([facility]);  Surgeon: Douglas Hopkins MD;  Location: [facility] OR    SOFT TISSUE SURGERY         Family History:    Family History   Problem Relation  Age of Onset    Hypertension Mother     Musculoskeletal Disorder Mother         degenerative disc disease    Thyroid Disease Mother     Depression Mother     Cerebrovascular Disease Mother     Heart Disease Mother     Hypertension Father     Cerebrovascular Disease Father         age 40    Hypertension Maternal Grandfather     Cardiovascular Maternal Grandfather     Cancer Paternal Grandfather         lung    Hypertension Brother     Bartter's syndrome Brother     Asthma No family hx of     C.A.D. No family hx of     Diabetes No family hx of     Breast Cancer No family hx of     Cancer - colorectal No family hx of     Prostate Cancer No family hx of        Social History:  Marital Status:   [2]  Social History     Tobacco Use    Smoking status: Never    Smokeless tobacco: Never   Vaping Use    Vaping status: Never Used   Substance Use Topics    Alcohol use: Yes     Alcohol/week: 0.0 standard drinks of alcohol     Comment: Rare. Maybe one drink a month    Drug use: No        Medications:    sulfamethoxazole-trimethoprim (BACTRIM DS) 800-160 MG tablet  APPLE CIDER VINEGAR PO  lisinopril (ZESTRIL) 20 MG tablet  phentermine 15 MG capsule  topiramate (TOPAMAX) 25 MG tablet  venlafaxine (EFFEXOR XR) 150 MG 24 hr capsule          Review of Systems   Constitutional: Negative.  Negative for fever.   Gastrointestinal: Negative.    Genitourinary:  Positive for dysuria, frequency and urgency. Negative for hematuria.   Musculoskeletal:  Positive for back pain.   All other systems reviewed and are negative.      Physical Exam   BP: 129/82  Pulse: 75  Temp: 98.2  F (36.8  C)  Resp: 17  SpO2: 99 %      Physical Exam  Constitutional:       General: She is not in acute distress.     Appearance: Normal appearance. She is not ill-appearing, toxic-appearing or diaphoretic.   HENT:      Head: Normocephalic and atraumatic.   Eyes:      Conjunctiva/sclera: Conjunctivae normal.   Pulmonary:      Effort: Pulmonary effort is normal.    Abdominal:      General: There is no distension.      Palpations: Abdomen is soft.      Tenderness: There is no abdominal tenderness. There is no right CVA tenderness, left CVA tenderness, guarding or rebound.   Musculoskeletal:         General: Normal range of motion.      Cervical back: Neck supple.   Skin:     General: Skin is warm and dry.   Neurological:      Mental Status: She is alert.         ED Course        Procedures      Results for orders placed or performed during the hospital encounter of 07/31/24 (from the past 24 hour(s))   UA with Microscopic reflex to Culture    Specimen: Urine, Clean Catch   Result Value Ref Range    Color Urine Yellow Colorless, Straw, Light Yellow, Yellow    Appearance Urine Cloudy (A) Clear    Glucose Urine Negative Negative mg/dL    Bilirubin Urine Negative Negative    Ketones Urine Negative Negative mg/dL    Specific Gravity Urine 1.020 1.003 - 1.035    Blood Urine Negative Negative    pH Urine 7.0 5.0 - 7.0    Protein Albumin Urine Negative Negative mg/dL    Urobilinogen Urine Normal Normal, 2.0 mg/dL    Nitrite Urine Negative Negative    Leukocyte Esterase Urine Trace (A) Negative    Bacteria Urine Few (A) None Seen /HPF    Mucus Urine Present (A) None Seen /LPF    Amorphous Crystals Urine Few (A) None Seen /HPF    RBC Urine 3 (H) <=2 /HPF    WBC Urine 5 <=5 /HPF    Squamous Epithelials Urine 7 (H) <=1 /HPF    Narrative    Urine Culture not indicated       Medications - No data to display    Assessments & Plan (with Medical Decision Making)     Pt is a 43 year old female who presents to Urgent Care with complaints of dysuria, urinary frequency and urgency, pelvic pressure, and diffuse low back pain.  Symptoms have been present for the past 2 days.      Pt is afebrile on arrival.  Exam as above.  Urinalysis was positive for trace leuk esterase, bacteria, 5 WBCs, and 3 RBCs.  Sample was contaminated with 7 squamous cells unfortunately.  Urine was sent for culture.   Discussed results with patient.  She states she has had a kidney stone in the past and these symptoms do not feel consistent with that.  She has not had flank pain.  Denies vaginal symptoms.  Will place on a 3-day course of Bactrim while awaiting urine culture results.  Encouraged continued symptomatic treatments at home.  Return precautions were reviewed.  Hand-outs were provided.    Patient was instructed to follow-up with PCP for continued care and management.  She is to return to the ED for persistent and/or worsening symptoms.  Patient expressed understanding of the diagnosis and plan and was discharged home in good condition.    I have reviewed the nursing notes.    I have reviewed the findings, diagnosis, plan and need for follow up with the patient.      Discharge Medication List as of 7/31/2024  4:44 PM        START taking these medications    Details   sulfamethoxazole-trimethoprim (BACTRIM DS) 800-160 MG tablet Take 1 tablet by mouth 2 times daily for 3 days, Disp-6 tablet, R-0, E-Prescribe             Final diagnoses:   Lower urinary tract symptoms   Abnormal urinalysis       7/31/2024   [facility] EMERGENCY DEPT      Disclaimer:  This note consists of symbols derived from keyboarding, dictation and/or voice recognition software.  As a result, there may be errors in the script that have gone undetected.  Please consider this when interpreting information found in this chart.     Roxie Daily PA-C  07/31/24 2468

## 2024-08-01 LAB
BACTERIA UR CULT: NORMAL
HEMOCCULT STL QL IA: NEGATIVE

## 2024-08-01 NOTE — RESULT ENCOUNTER NOTE
Final urine culture report is negative.  Adult: Negative urine culture parameters per protocol: Any # urogenital sheela, single or mixed   [facility] Emergency Dept discharge antibiotic prescribed (If applicable): Bactrim DS  Treatment recommendations per [facility] ED Lab Result Urine Culture protocol: No change in plan of care.

## 2024-10-16 DIAGNOSIS — E66.811 CLASS 1 OBESITY: ICD-10-CM

## 2024-10-21 RX ORDER — PHENTERMINE HYDROCHLORIDE 15 MG/1
15 CAPSULE ORAL 2 TIMES DAILY
Qty: 60 CAPSULE | Refills: 5 | Status: SHIPPED | OUTPATIENT
Start: 2024-10-21

## 2024-11-18 ENCOUNTER — VIRTUAL VISIT (OUTPATIENT)
Dept: INTERNAL MEDICINE | Facility: CLINIC | Age: 44
End: 2024-11-18
Payer: COMMERCIAL

## 2024-11-18 DIAGNOSIS — L03.012 PARONYCHIA OF FINGER, LEFT: Primary | ICD-10-CM

## 2024-11-18 PROCEDURE — 99213 OFFICE O/P EST LOW 20 MIN: CPT | Mod: 95 | Performed by: INTERNAL MEDICINE

## 2024-11-18 RX ORDER — TRIAMCINOLONE ACETONIDE 1 MG/G
CREAM TOPICAL 2 TIMES DAILY
Qty: 30 G | Refills: 2 | Status: SHIPPED | OUTPATIENT
Start: 2024-11-18

## 2024-11-18 RX ORDER — MUPIROCIN 20 MG/G
OINTMENT TOPICAL 3 TIMES DAILY
Qty: 30 G | Refills: 2 | Status: SHIPPED | OUTPATIENT
Start: 2024-11-18

## 2024-11-18 RX ORDER — OMEGA-3 FATTY ACIDS/FISH OIL 300-1000MG
600 CAPSULE ORAL 3 TIMES DAILY
COMMUNITY
Start: 2024-11-18

## 2024-11-18 ASSESSMENT — PATIENT HEALTH QUESTIONNAIRE - PHQ9
SUM OF ALL RESPONSES TO PHQ QUESTIONS 1-9: 3
10. IF YOU CHECKED OFF ANY PROBLEMS, HOW DIFFICULT HAVE THESE PROBLEMS MADE IT FOR YOU TO DO YOUR WORK, TAKE CARE OF THINGS AT HOME, OR GET ALONG WITH OTHER PEOPLE: SOMEWHAT DIFFICULT
SUM OF ALL RESPONSES TO PHQ QUESTIONS 1-9: 3

## 2024-11-18 NOTE — PATIENT INSTRUCTIONS
Mupirocin ointment 3 times daily for 5 or 6 days    Triamcinolone cream mixed with mupirocin    Continue ibuprofen 600 mg 3 times daily    Refills on phentermine noted    MyChart communication sent.  If no improvement in 48 hours add oral antibiotics

## 2024-11-18 NOTE — PROGRESS NOTES
"Cecile is a 43 year old who is being evaluated via a billable video visit.    How would you like to obtain your AVS? MyChart  If the video visit is dropped, the invitation should be resent by: Text to cell phone: [phone]  Will anyone else be joining your video visit? No  {If patient encounters technical issues they should call [phone] :159708}    {PROVIDER CHARTING PREFERENCE:803734}    Subjective   Cecile is a 43 year old, presenting for the following health issues:  Infection (Left hand pointer finger is infected from nail biting.)      11/18/2024    12:03 PM   Additional Questions   Roomed by Dia ESTRADA     History of Present Illness       Reason for visit:  Infected left pointed finger  Symptom onset:  1-3 days ago  Symptoms include:  Red, swollen, and painfull  Symptom intensity:  Moderate  Symptom progression:  Worsening  Had these symptoms before:  Yes  Has tried/received treatment for these symptoms:  Yes  Previous treatment was successful:  No  What makes it worse:  No  What makes it better:  Antibiotics   She is taking medications regularly.       {SUPERLIST (Optional):117540}  {additonal problems for provider to add (Optional):233198}    {ROS Picklists (Optional):588149}      Objective    Vitals - Patient Reported  Pain Score: Moderate Pain (4)  Pain Loc: Finger        Physical Exam   {video visit exam brief selected:834901}    {Diagnostic Test Results (Optional):791618}      Video-Visit Details    Type of service:  Video Visit   Originating Location (pt. Location): {video visit patient location:792430::\"Home\"}  {PROVIDER LOCATION On-site should be selected for visits conducted from your clinic location or adjoining [facility] hospital, academic office, or other nearby [facility] building. Off-site should be selected for all other provider locations, including home:444967}  Distant Location (provider location):  {virtual location provider:198887}  Platform used for Video Visit: {Virtual Visit " "Platforms:071745::\"Marlyn\"}  Signed Electronically by: Ramin Vargas MD  {Email feedback regarding this note to [email or web address]   :350382}  "

## 2024-11-18 NOTE — PROGRESS NOTES
OFFICE VISIT--Video    Cecile is a 43 year old female contacting the clinic today via video, who will use the platform: [employer] for the visit.  Phone # for [employer], or if [facility] drops:   Telephone Information:   Mobile [phone]          ASSESSMENT and PLAN:  1. Paronychia of finger, left (Primary)  Attempt to treat topically given focal area.  Discussed component of inflammation versus infection  - mupirocin (BACTROBAN) 2 % external ointment; Apply topically 3 times daily.  Dispense: 30 g; Refill: 2  - ibuprofen (ADVIL/MOTRIN) 200 MG capsule; Take 3 capsules (600 mg) by mouth 3 times daily.  - triamcinolone (KENALOG) 0.1 % external cream; Apply topically 2 times daily.  Dispense: 30 g; Refill: 2       Patient Instructions   Mupirocin ointment 3 times daily for 5 or 6 days    Triamcinolone cream mixed with mupirocin    Continue ibuprofen 600 mg 3 times daily    Refills on phentermine noted    MyChart communication sent.  If no improvement in 48 hours add oral antibiotics            Return if symptoms worsen or fail to improve, for using a video visit.       CHIEF COMPLAINT:  Chief Complaint   Patient presents with    Infection     Left hand pointer finger is infected from nail biting.       HISTORY OF PRESENT ILLNESS:  Cecile is a 43 year old female contacting the clinic today via video for complaint of paronychia of left index finger.  Reports chewing her nails and this has occurred periodically.  Has received oral antibiotics for this, and also bladder infections.  Discussed component of inflammation versus infection and keeping treatment more topical.  Recommend topical antibiotic and steroid.  If no improvement can add oral.  Symptoms have been progressing over the last 3 days    Requests refill of phentermine.  Prescription and PDMP noted and refills available    History of Present Illness       Reason for visit:  Infected left pointed finger  Symptom onset:  1-3 days ago  Symptoms include:  Red, swollen, and  "painfull  Symptom intensity:  Moderate  Symptom progression:  Worsening  Had these symptoms before:  Yes  Has tried/received treatment for these symptoms:  Yes  Previous treatment was successful:  No  What makes it worse:  No  What makes it better:  Antibiotics   She is taking medications regularly.      REVIEW OF SYSTEMS:         Today's PHQ-2 Score:       3/31/2023    10:13 AM   PHQ-2 ( 1999 [employer])   PHQ-2 Score Incomplete       PFSH:  Social History     Social History Narrative    .    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         TOBACCO USE:  History   Smoking Status    Never   Smokeless Tobacco    Never       VITALS:  There were no vitals filed for this visit.  LMP  (LMP Unknown)  Estimated body mass index is 30.29 kg/m  as calculated from the following:    Height as of 7/15/24: 1.543 m (5' 0.75\").    Weight as of 7/15/24: 72.1 kg (159 lb).    PHYSICAL EXAM:  (observations via Video)  Alert and oriented.  Index finger shows paronychia without abscess.  Very small open area laterally    MEDICATIONS:   Current Outpatient Medications   Medication Sig Dispense Refill    ibuprofen (ADVIL/MOTRIN) 200 MG capsule Take 3 capsules (600 mg) by mouth 3 times daily.      lisinopril (ZESTRIL) 20 MG tablet Take 1 tablet (20 mg) by mouth daily 90 tablet 3    mupirocin (BACTROBAN) 2 % external ointment Apply topically 3 times daily. 30 g 2    phentermine 15 MG capsule Take 1 capsule (15 mg) by mouth 2 times daily 60 capsule 5    topiramate (TOPAMAX) 25 MG tablet Take 1 tablet (25 mg) by mouth daily 90 tablet 3    triamcinolone (KENALOG) 0.1 % external cream Apply topically 2 times daily. 30 g 2    venlafaxine (EFFEXOR XR) 150 MG 24 hr capsule Take 1 capsule (150 mg) by mouth daily 90 capsule 3       Outside Notes summarized:   Labs, x-rays, cardiology, GI tests reviewed:   Imaging:   No results found for this or any previous visit (from the past 744 hours).   Recent Labs   Lab Test 05/08/24  0732 03/31/23  1057    " "137   POTASSIUM 4.4 4.3   CR 0.67 0.73     No results found for: \"[id number]\"  Lab Results   Component Value Date    CHOL 143 05/08/2024    CHOL 137 02/05/2020     New orders: No orders of the defined types were placed in this encounter.      Independent review of:   Patient would like to receive their AVS by [employer]    Video-Visit Details  Type of service:  Video Visit      7/15/2024     8:40 AM   Additional Questions   Roomed by Luis M ADAME CMA   Accompanied by self     Patient has given verbal consent to a Video visit?  Yes  How would you like to obtain your AVS?  [employer]  Will anyone else be joining your video visit, giving supplemental history? No    Originating location (pt location): Home    Distant Location (provider location):  Off-site    Video Start Time: 12:01 PM  Video End time:  12:21 PM  Face to face plus orders: 20 minutes  Documentation time:  3 minutes     The visit lasted a total of 23 minutes    Ramin Vargas MD  Internal Medicine  [facility]     "

## 2024-11-24 ENCOUNTER — HOSPITAL ENCOUNTER (EMERGENCY)
Facility: CLINIC | Age: 44
Discharge: HOME | End: 2024-11-24
Attending: PHYSICIAN ASSISTANT | Admitting: PHYSICIAN ASSISTANT

## 2024-11-24 VITALS
HEART RATE: 72 BPM | OXYGEN SATURATION: 100 % | SYSTOLIC BLOOD PRESSURE: 144 MMHG | DIASTOLIC BLOOD PRESSURE: 89 MMHG | TEMPERATURE: 97.6 F | RESPIRATION RATE: 16 BRPM

## 2024-11-24 DIAGNOSIS — S39.012A BACK STRAIN, INITIAL ENCOUNTER: ICD-10-CM
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
## 2024-11-24 PROCEDURE — 99213 OFFICE O/P EST LOW 20 MIN: CPT | Performed by: PHYSICIAN ASSISTANT

## 2024-11-24 PROCEDURE — G0463 HOSPITAL OUTPT CLINIC VISIT: HCPCS | Performed by: PHYSICIAN ASSISTANT

## 2024-11-24 RX ORDER — CYCLOBENZAPRINE HCL 10 MG
10 TABLET ORAL 3 TIMES DAILY PRN
Qty: 21 TABLET | Refills: 0 | Status: SHIPPED | OUTPATIENT
Start: 2024-11-24 | End: 2024-12-01

## 2024-11-24 ASSESSMENT — COLUMBIA-SUICIDE SEVERITY RATING SCALE - C-SSRS
6. HAVE YOU EVER DONE ANYTHING, STARTED TO DO ANYTHING, OR PREPARED TO DO ANYTHING TO END YOUR LIFE?: NO
2. HAVE YOU ACTUALLY HAD ANY THOUGHTS OF KILLING YOURSELF IN THE PAST MONTH?: NO
1. IN THE PAST MONTH, HAVE YOU WISHED YOU WERE DEAD OR WISHED YOU COULD GO TO SLEEP AND NOT WAKE UP?: NO

## 2024-11-24 ASSESSMENT — ACTIVITIES OF DAILY LIVING (ADL): ADLS_ACUITY_SCORE: 0
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
## 2024-11-29 ENCOUNTER — MYC MEDICAL ADVICE (OUTPATIENT)
Dept: UROLOGY | Facility: CLINIC | Age: 44
End: 2024-11-29
Payer: COMMERCIAL
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
## 2024-11-29 PROBLEM — S39.012A BACK STRAIN, INITIAL ENCOUNTER: Status: ACTIVE | Noted: 2024-11-29

## 2024-12-03 DIAGNOSIS — F33.1 MAJOR DEPRESSIVE DISORDER, RECURRENT EPISODE, MODERATE (H): ICD-10-CM

## 2024-12-03 DIAGNOSIS — F41.1 GENERALIZED ANXIETY DISORDER: Chronic | ICD-10-CM

## 2024-12-03 RX ORDER — VENLAFAXINE HYDROCHLORIDE 150 MG/1
150 CAPSULE, EXTENDED RELEASE ORAL DAILY
Qty: 90 CAPSULE | Refills: 3 | Status: SHIPPED | OUTPATIENT
Start: 2024-12-03

## 2024-12-04 ENCOUNTER — THERAPY VISIT (OUTPATIENT)
Dept: PHYSICAL THERAPY | Facility: CLINIC | Age: 44
End: 2024-12-04
Attending: PHYSICIAN ASSISTANT
Payer: COMMERCIAL

## 2024-12-04 DIAGNOSIS — S39.012A BACK STRAIN, INITIAL ENCOUNTER: ICD-10-CM
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
## 2024-12-04 PROCEDURE — 97140 MANUAL THERAPY 1/> REGIONS: CPT | Mod: GP | Performed by: PHYSICAL THERAPIST

## 2024-12-04 PROCEDURE — 97110 THERAPEUTIC EXERCISES: CPT | Mod: GP | Performed by: PHYSICAL THERAPIST

## 2024-12-04 NOTE — TELEPHONE ENCOUNTER
Pt called back. Appt scheduled.    Nighat UGARTE RN   [facility], Urology   12/4/2024 10:58 AM

## 2024-12-04 NOTE — TELEPHONE ENCOUNTER
Writer called and left VM for patient to call back to schedule VV this week per Dr. Hopkins. Preferably on 12.5.24  Awaiting call back.    Nighat UGARTE RN Urology 12/4/2024 10:09 AM

## 2024-12-05 ENCOUNTER — TELEPHONE (OUTPATIENT)
Dept: UROLOGY | Facility: CLINIC | Age: 44
End: 2024-12-05

## 2024-12-05 ENCOUNTER — PREP FOR PROCEDURE (OUTPATIENT)
Dept: UROLOGY | Facility: CLINIC | Age: 44
End: 2024-12-05

## 2024-12-05 ENCOUNTER — TELEPHONE (OUTPATIENT)
Dept: OTOLARYNGOLOGY | Facility: CLINIC | Age: 44
End: 2024-12-05

## 2024-12-05 ENCOUNTER — VIRTUAL VISIT (OUTPATIENT)
Dept: UROLOGY | Facility: CLINIC | Age: 44
End: 2024-12-05
Payer: COMMERCIAL

## 2024-12-05 DIAGNOSIS — N39.3 SUI (STRESS URINARY INCONTINENCE, FEMALE): Primary | ICD-10-CM

## 2024-12-05 DIAGNOSIS — N32.81 OAB (OVERACTIVE BLADDER): Primary | ICD-10-CM

## 2024-12-05 NOTE — PROGRESS NOTES
F/up MARYELLEN        2020                Sling by me  10/11/23 Ngein Doan: MARYELLEN >> to us  4/10/24 Bulkamid attempt; only one bleb >> pt with vasovagal  4/17/24 MARYELLEN improved with Bulkamid      Pt is dry with normal activity but leaks with horseback riding an now with her new activity: Pickleball.      Denies dysuria, gross hematuria, frequency and nocturia.        IMP:  MARYELLEN improved with (incomplete) Bulkamid        PLAN:  Suggest additional Bulkamid. Discussed rationale, mech of action, technical aspects, etc; pt expresses understanding, elects to proceed.    Pt is also considering hemorroidectomy; will be meeting with General Surgeon tomorrow. We could certainly combine procedures if surgeon is willing to operate at [facility] on a Monday. Pt will work on this.    2. Total time spent in reviewing patient's previous records, discussion with patient and documentation: 20 minutes

## 2024-12-05 NOTE — TELEPHONE ENCOUNTER
Type of surgery: CYSTOSCOPY, WITH PERIURETHRAL BULKING AGENT INJECTION   Location of surgery: [facility]

## 2024-12-05 NOTE — TELEPHONE ENCOUNTER
Health Call Center    Phone Message    May a detailed message be left on voicemail: yes     Reason for Call: Other: Patient called in returning nurse Eliza's call. Please review and call patient back after 11am.      Action Taken: Message routed to:  Other: [place] [facility]    Travel Screening: Not Applicable     Date of Service:

## 2024-12-06 ENCOUNTER — HOSPITAL ENCOUNTER (OUTPATIENT)
Dept: MAMMOGRAPHY | Facility: CLINIC | Age: 44
Discharge: HOME | End: 2024-12-06
Attending: INTERNAL MEDICINE | Admitting: INTERNAL MEDICINE
Payer: COMMERCIAL

## 2024-12-06 DIAGNOSIS — Z12.31 VISIT FOR SCREENING MAMMOGRAM: ICD-10-CM

## 2024-12-06 PROCEDURE — 77063 BREAST TOMOSYNTHESIS BI: CPT

## 2024-12-09 DIAGNOSIS — L03.012 PARONYCHIA OF FINGER, LEFT: ICD-10-CM

## 2024-12-09 RX ORDER — TRIAMCINOLONE ACETONIDE 1 MG/G
CREAM TOPICAL 2 TIMES DAILY
Qty: 30 G | Refills: 2 | OUTPATIENT
Start: 2024-12-09

## 2024-12-10 ENCOUNTER — TELEPHONE (OUTPATIENT)
Dept: FAMILY MEDICINE | Facility: CLINIC | Age: 44
End: 2024-12-10
Payer: COMMERCIAL

## 2024-12-10 NOTE — TELEPHONE ENCOUNTER
Patient Quality Outreach    Patient is due for the following:   Hypertension -  BP check    Action(s) Taken:   Schedule a nurse only visit for bp    Type of outreach:    Sent letter.    Questions for provider review:    None           Etta Mohamud MA  Chart routed to .

## 2024-12-11 ENCOUNTER — THERAPY VISIT (OUTPATIENT)
Dept: PHYSICAL THERAPY | Facility: CLINIC | Age: 44
End: 2024-12-11
Attending: PHYSICIAN ASSISTANT
Payer: COMMERCIAL
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
## 2024-12-11 DIAGNOSIS — S39.012A BACK STRAIN, INITIAL ENCOUNTER: ICD-10-CM

## 2024-12-11 PROCEDURE — 97140 MANUAL THERAPY 1/> REGIONS: CPT | Mod: GP | Performed by: PHYSICAL THERAPIST

## 2024-12-11 PROCEDURE — 97110 THERAPEUTIC EXERCISES: CPT | Mod: GP | Performed by: PHYSICAL THERAPIST

## 2024-12-18 ENCOUNTER — THERAPY VISIT (OUTPATIENT)
Dept: PHYSICAL THERAPY | Facility: CLINIC | Age: 44
End: 2024-12-18
Attending: PHYSICIAN ASSISTANT
Payer: COMMERCIAL

## 2024-12-18 DIAGNOSIS — S39.012A BACK STRAIN, INITIAL ENCOUNTER: ICD-10-CM
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
## 2024-12-18 PROCEDURE — 97110 THERAPEUTIC EXERCISES: CPT | Mod: GP | Performed by: PHYSICAL THERAPIST

## 2024-12-18 PROCEDURE — 97140 MANUAL THERAPY 1/> REGIONS: CPT | Mod: GP | Performed by: PHYSICAL THERAPIST

## 2024-12-23 ENCOUNTER — OFFICE VISIT (OUTPATIENT)
Dept: SURGERY | Facility: CLINIC | Age: 44
End: 2024-12-23
Payer: COMMERCIAL

## 2024-12-23 VITALS
BODY MASS INDEX: 28.51 KG/M2 | DIASTOLIC BLOOD PRESSURE: 73 MMHG | SYSTOLIC BLOOD PRESSURE: 126 MMHG | HEART RATE: 79 BPM | WEIGHT: 151 LBS | HEIGHT: 61 IN | OXYGEN SATURATION: 97 % | TEMPERATURE: 99.1 F

## 2024-12-23 DIAGNOSIS — K64.1 GRADE II INTERNAL HEMORRHOIDS: Primary | ICD-10-CM

## 2024-12-23 PROCEDURE — 46221 LIGATION OF HEMORRHOID(S): CPT | Performed by: STUDENT IN AN ORGANIZED HEALTH CARE EDUCATION/TRAINING PROGRAM

## 2024-12-23 NOTE — LETTER
12/23/2024      Cceile Lim  [id number] [address] [place] [id number]      Dear Colleague,    Thank you for referring your patient, Cecile Lim, to the [facility]. Please see a copy of my visit note below.    Procedure note    Pre-operative diagnosis: Enlarged grade II hemorrhoids, mild dilated external hemorrhoids   Post-operative diagnosis same   Procedure:  Internal hemorrhoid rubber band ligation   Surgeon: Alessandro Wallace MD    Assistants(s): n/a    Anesthesia: None   Findings: Enlarged left lateral and right anterior cushion hemorrhoids. .   Complications: None.   Condition: Stable       Indications for the procedure:   This is a woman with grade 2 symptomatic internal hemorrhoid.  She has attempted conservative management with no success.  Thus I recommended rubber band ligation.   Risks, benefits, alternatives were explained to the patient showed understanding wish to proceed.     Description of procedure:  Patient was prepped and draped in usual sterile fashion.  Patient was positioned prone with hips raised up. A digital rectal exam was performed without palpable masses or lesions. Anoscopy was performed which showed grade II internal hemorrhoids, especially on the left lateral and right anterior quadrant.  Once the target hemorrhoid was identified on the left lateral quadrant, I grabbed onto internal hemorrhoid with a forceps.  Patient did not have any pain or any sensation during this.  Thus I placed the rubber banding device over the area of concern, the forceps was used to pull the hemorrhoid into the rubber band during device.  One rubber band was deployed.  Patient tolerated the procedure well with no tenderness or bleeding afterwards.  Was able to see the rubber band at the base of the hemorrhoid.  No bleeding was noted.  This was repeated for the right anterior quadrant hemorrhoid. The patient tolerated the procedure  well.       Alessandro Wallace MD     Disclaimer: This  note consists of words and symbols derived from keyboarding and dictation using voice recognition software.  As a result, there may be errors that have gone undetected.  Please consider this when interpreting information found in this note.      Again, thank you for allowing me to participate in the care of your patient.        Sincerely,        Alessandro Wallace MD

## 2024-12-23 NOTE — PROGRESS NOTES
Procedure note    Pre-operative diagnosis: Enlarged grade II hemorrhoids, mild dilated external hemorrhoids   Post-operative diagnosis same   Procedure:  Internal hemorrhoid rubber band ligation   Surgeon: Alessandro Wallace MD    Assistants(s): n/a    Anesthesia: None   Findings: Enlarged left lateral and right anterior cushion hemorrhoids. .   Complications: None.   Condition: Stable       Indications for the procedure:   This is a woman with grade 2 symptomatic internal hemorrhoid.  She has attempted conservative management with no success.  Thus I recommended rubber band ligation.   Risks, benefits, alternatives were explained to the patient showed understanding wish to proceed.     Description of procedure:  Patient was prepped and draped in usual sterile fashion.  Patient was positioned prone with hips raised up. A digital rectal exam was performed without palpable masses or lesions. Anoscopy was performed which showed grade II internal hemorrhoids, especially on the left lateral and right anterior quadrant.  Once the target hemorrhoid was identified on the left lateral quadrant, I grabbed onto internal hemorrhoid with a forceps.  Patient did not have any pain or any sensation during this.  Thus I placed the rubber banding device over the area of concern, the forceps was used to pull the hemorrhoid into the rubber band during device.  One rubber band was deployed.  Patient tolerated the procedure well with no tenderness or bleeding afterwards.  Was able to see the rubber band at the base of the hemorrhoid.  No bleeding was noted.  This was repeated for the right anterior quadrant hemorrhoid. The patient tolerated the procedure  well.       Alessandro Wallace MD     Disclaimer: This note consists of words and symbols derived from keyboarding and dictation using voice recognition software.  As a result, there may be errors that have gone undetected.  Please consider this when interpreting information found in this  note.

## 2024-12-23 NOTE — PATIENT INSTRUCTIONS
Continue fiber intake with goal of 30 grams daily  Continue to drink 2 L water daily  Daily sitz baths  Follow up  in surgery clinic in 2-3 months for recheck and possible rebanding

## 2025-04-02 ENCOUNTER — OFFICE VISIT (OUTPATIENT)
Dept: FAMILY MEDICINE | Facility: CLINIC | Age: 45
End: 2025-04-02
Payer: COMMERCIAL

## 2025-04-02 VITALS
HEIGHT: 61 IN | RESPIRATION RATE: 14 BRPM | OXYGEN SATURATION: 98 % | DIASTOLIC BLOOD PRESSURE: 82 MMHG | HEART RATE: 67 BPM | BODY MASS INDEX: 27.94 KG/M2 | WEIGHT: 148 LBS | SYSTOLIC BLOOD PRESSURE: 124 MMHG | TEMPERATURE: 98.2 F

## 2025-04-02 DIAGNOSIS — Z30.430 ENCOUNTER FOR INSERTION OF INTRAUTERINE CONTRACEPTIVE DEVICE: ICD-10-CM

## 2025-04-02 DIAGNOSIS — Z30.014 ENCOUNTER FOR INITIAL PRESCRIPTION OF INTRAUTERINE CONTRACEPTIVE DEVICE (IUD): Primary | ICD-10-CM

## 2025-04-02 DIAGNOSIS — R10.2 PELVIC CRAMPING: ICD-10-CM

## 2025-04-02 LAB — HCG UR QL: NEGATIVE

## 2025-04-02 PROCEDURE — 81025 URINE PREGNANCY TEST: CPT | Performed by: FAMILY MEDICINE

## 2025-04-02 PROCEDURE — 58300 INSERT INTRAUTERINE DEVICE: CPT | Performed by: FAMILY MEDICINE

## 2025-04-02 PROCEDURE — 99213 OFFICE O/P EST LOW 20 MIN: CPT | Mod: 25 | Performed by: FAMILY MEDICINE

## 2025-04-02 ASSESSMENT — PAIN SCALES - GENERAL: PAINLEVEL_OUTOF10: NO PAIN (0)

## 2025-04-02 NOTE — PROGRESS NOTES
"  Assessment & Plan     Encounter for initial prescription of intrauterine contraceptive device (IUD)  Unfortunately had mild vasovagal and significant cramping after procedure.  Recommended to continue on ibuprofen, symptoms are monitored and improved in the clinic.  Will plan to do pelvic ultrasound to assess placement of IUD.  If significant pain or worsening symptoms should be seen in the ER sooner.  Encouraged to call with any questions  - HCG Qual, Urine ([id number])  - levonorgestrel (MIRENA) 52 MG (20 mcg/day) IUD 1 each  - INSERTION INTRAUTERINE DEVICE    Encounter for insertion of intrauterine contraceptive device  - levonorgestrel (MIRENA) 52 MG (20 mcg/day) IUD 1 each  - INSERTION INTRAUTERINE DEVICE  - US Pelvis Complete without Transvaginal    Pelvic cramping  - US Pelvis Complete without Transvaginal            BMI  Estimated body mass index is 28.19 kg/m  as calculated from the following:    Height as of this encounter: 1.543 m (5' 0.75\").    Weight as of this encounter: 67.1 kg (148 lb).             Jeanmarie Huber is a 44 year old, presenting for the following health issues:  Contraception        4/2/2025    12:32 PM   Additional Questions   Roomed by Nusrat SEARS MA   Accompanied by Self     HPI      IUD- Would like to get an IUD, doesn't matter which kind. Has irregular periods. Estimated LMP 3/10/25    Review of Systems  Constitutional, HEENT, cardiovascular, pulmonary, gi and gu systems are negative, except as otherwise noted.      Objective    /82 (BP Location: Right arm, Patient Position: Chair, Cuff Size: Adult Regular)   Pulse 67   Temp 98.2  F (36.8  C)   Resp 14   Ht 1.543 m (5' 0.75\")   Wt 67.1 kg (148 lb)   LMP 03/10/2025   SpO2 98%   BMI 28.19 kg/m    Body mass index is 28.19 kg/m .  Physical Exam               Signed Electronically by: Stephanie Lamb, DO  IUD Insertion:  CONSULT:    Is a pregnancy test required: Yes.  Was it positive or negative?  Negative  Was a consent " "obtained?  Yes    Subjective: Cecile Lim is a 44 year old  presents for IUD and desires Mirena type IUD.    Patient has been given the opportunity to ask questions about all forms of birth control, including all options appropriate for Cecile Lim. Discussed that no method of birth control, except abstinence is 100% effective against pregnancy or sexually transmitted infection.     Cecile Lim understands she may have the IUD removed at any time. IUD should be removed by a health care provider.    The entire insertion procedure was reviewed with the patient, including care after placement.    Patient's last menstrual period was 03/10/2025. . No allergy to betadine or shellfish. Patient declines STD screening  HCG Qual Urine   Date Value Ref Range Status   2018 Negative NEG^Negative Final     Comment:     This test is for screening purposes.  Results should be interpreted along with   the clinical picture.  Confirmation testing is available if warranted by   ordering [id number], HCG Quantitative Pregnancy.       hCG Urine Qualitative   Date Value Ref Range Status   2025 Negative Negative Final     Comment:     This test is for screening purposes.  Results should be interpreted along with the clinical picture.  Confirmation testing is available if warranted by ordering [id number], HCG Quantitative Pregnancy.         /82 (BP Location: Right arm, Patient Position: Chair, Cuff Size: Adult Regular)   Pulse 67   Temp 98.2  F (36.8  C)   Resp 14   Ht 1.543 m (5' 0.75\")   Wt 67.1 kg (148 lb)   LMP 03/10/2025   SpO2 98%   BMI 28.19 kg/m      Pelvic Exam:   EG/BUS: normal genital architecture without lesions, erythema or abnormal secretions.   Vagina: moist, pink, rugae with physiologic discharge and secretions  Cervix: parous no lesions and pink, moist, closed, without lesion or CMT  Uterus: midposition, mobile, no pain  Adnexa: within normal limits and no masses, nodularity, " tenderness    PROCEDURE NOTE: -- IUD Insertion    Reason for Insertion: contraception    Premedicated with ibuprofen.  Under sterile technique, cervix was visualized with speculum and prepped with Betadine solution swab x 3. Tenaculum was placed for stability. The uterus was gently straightened and sounded to 7.0 cm.  Initial IUD was deployed and IUD was seen in cervical opening.  This was removed without difficulty.  New IUD prepared for placement, and IUD inserted according to 's instructions without difficulty or significant resitance, and deployed at the fundus. The strings were visualized and trimmed to 4.0 cm from the external os. Tenaculum was removed and hemostasis noted. Speculum removed.  Patient tolerated procedure well.  Did have some cramping and mild vasovagal symptoms after procedure.  Was able to rest and hydrate and recovered.  With cramping we will plan to obtain ultrasound to further eval placement.  Have encouraged her to call with any questions and to continue Tylenol and ibuprofen as needed    EBL: minimal    Complications: none    ASSESSMENT:     ICD-10-CM    1. Encounter for initial prescription of intrauterine contraceptive device (IUD)  Z30.014 HCG Qual, Urine ([id number])     levonorgestrel (MIRENA) 52 MG (20 mcg/day) IUD 1 each     INSERTION INTRAUTERINE DEVICE      2. Encounter for insertion of intrauterine contraceptive device  Z30.430 levonorgestrel (MIRENA) 52 MG (20 mcg/day) IUD 1 each     INSERTION INTRAUTERINE DEVICE             PLAN:    Given 's handouts, including when to have IUD removed, list of danger s/sx, side effects and follow up recommended. Encouraged condom use for prevention of STD. Back up contraception advised for 7 days if progestin method. Advised to call for any fever, for prolonged or severe pain or bleeding, abnormal vaginal discharge, or unable to palpate strings. She was advised to use pain medications (ibuprofen) as needed for mild to  moderate pain. Advised to follow-up in clinic in 4-6 weeks for IUD string check if unable to find strings or as directed by provider.     Stephanie Lamb, DO

## 2025-04-07 DIAGNOSIS — E66.811 CLASS 1 OBESITY: ICD-10-CM

## 2025-04-08 RX ORDER — PHENTERMINE HYDROCHLORIDE 15 MG/1
15 CAPSULE ORAL 2 TIMES DAILY
Qty: 60 CAPSULE | Refills: 0 | Status: SHIPPED | OUTPATIENT
Start: 2025-04-08

## (undated) DEVICE — SOL WATER IRRIG 1000ML BOTTLE 07139-09

## (undated) DEVICE — BLADE KNIFE SURG 15 371115

## (undated) DEVICE — SYR BULB IRRIG DOVER 60 ML LATEX FREE 67000

## (undated) DEVICE — SUCTION TIP YANKAUER STR K87

## (undated) DEVICE — ESU PENCIL W/COATED BLADE E2450H

## (undated) DEVICE — GLOVE PROTEXIS BLUE W/NEU-THERA 8.0  2D73EB80

## (undated) DEVICE — SU VICRYL 3-0 SH 27" UND J416H

## (undated) DEVICE — LUBRICATING JELLY 4.25OZ

## (undated) DEVICE — DECANTER VIAL 2006S

## (undated) DEVICE — SPONGE LAP 18X18" X8435

## (undated) DEVICE — SOL NACL 0.9% INJ 1000ML BAG 2B1324X

## (undated) DEVICE — NDL 22GA 1.5"

## (undated) DEVICE — SU VICRYL 2-0 CT-3 27" J328H

## (undated) DEVICE — DRSG KERLIX FLUFFS X5

## (undated) DEVICE — ADHESIVE SWIFTSET 0.8ML OCTYL SS6

## (undated) DEVICE — NDL COUNTER 20CT 31142493

## (undated) DEVICE — PACK LAPAROTOMY CUSTOM LAKES

## (undated) DEVICE — GOWN XLG DISP 9545

## (undated) DEVICE — RETR RING LONE STAR 14.1X14.1CM 3307G

## (undated) DEVICE — SPONGE LAP 18X18" 1515

## (undated) DEVICE — PREP CHLORAPREP 26ML TINTED ORANGE  260815

## (undated) DEVICE — PAD CHUX UNDERPAD 30X30"

## (undated) DEVICE — SYR BULB IRRIG 50ML LATEX FREE 0035280

## (undated) DEVICE — DRAIN JACKSON PRATT 10MM FLAT 4/4 PERF SU130-1311

## (undated) DEVICE — SOL NACL 0.9% IRRIG 1000ML BOTTLE 07138-09

## (undated) DEVICE — LIGHT HANDLE X2

## (undated) DEVICE — GLOVE PROTEXIS W/NEU-THERA 8.0  2D73TE80

## (undated) DEVICE — TUBING SUCTION 12"X1/4" N612

## (undated) DEVICE — SPONGE PACK VAGINAL 2X36"

## (undated) DEVICE — GLOVE PROTEXIS W/NEU-THERA 7.5  2D73TE75

## (undated) DEVICE — DRAIN JACKSON PRATT RESERVOIR 100ML SU130-1305

## (undated) DEVICE — STOCKING SLEEVE COMPRESSION CALF LG

## (undated) DEVICE — SU MONOCRYL 4-0 PS-2 18" UND Y496G

## (undated) DEVICE — DRAPE VAGI BAG 18X9" 1072

## (undated) DEVICE — DRSG DRAIN 4X4" 7086

## (undated) DEVICE — SOL WATER IRRIG 1000ML BOTTLE 2F7114

## (undated) DEVICE — DECANTER BAG 2002S

## (undated) DEVICE — CATH FOLEY 20FR 5ML SIL 165820

## (undated) DEVICE — RETR ELASTIC STAYS LONE STAR SHARP 5MM 8/PACK 3311-8G

## (undated) DEVICE — SPONGE RAY-TEC 4X8" 7318

## (undated) DEVICE — Device

## (undated) DEVICE — PAD FLOOR SURGISAFE

## (undated) DEVICE — PAD PERI INDIV WRAP 11" 2022

## (undated) DEVICE — NDL 18GA 1.5" 305196

## (undated) DEVICE — SYR 10ML FINGER CONTROL W/O NDL 309695

## (undated) DEVICE — SU ETHILON 2-0 FS 18" 664G

## (undated) DEVICE — SU VICRYL 2-0 UR-5 27" J375H

## (undated) RX ORDER — PROPOFOL 10 MG/ML
INJECTION, EMULSION INTRAVENOUS
Status: DISPENSED
Start: 2018-11-29

## (undated) RX ORDER — LIDOCAINE HYDROCHLORIDE 10 MG/ML
INJECTION, SOLUTION EPIDURAL; INFILTRATION; INTRACAUDAL; PERINEURAL
Status: DISPENSED
Start: 2018-12-07

## (undated) RX ORDER — BUPIVACAINE HYDROCHLORIDE 5 MG/ML
INJECTION, SOLUTION PERINEURAL
Status: DISPENSED
Start: 2018-12-07

## (undated) RX ORDER — FENTANYL CITRATE 50 UG/ML
INJECTION, SOLUTION INTRAMUSCULAR; INTRAVENOUS
Status: DISPENSED
Start: 2018-11-29

## (undated) RX ORDER — LIDOCAINE HYDROCHLORIDE AND EPINEPHRINE 10; 10 MG/ML; UG/ML
INJECTION, SOLUTION INFILTRATION; PERINEURAL
Status: DISPENSED
Start: 2018-11-29

## (undated) RX ORDER — DEXAMETHASONE SODIUM PHOSPHATE 10 MG/ML
INJECTION, SOLUTION INTRAMUSCULAR; INTRAVENOUS
Status: DISPENSED
Start: 2018-04-27

## (undated) RX ORDER — PROPOFOL 10 MG/ML
INJECTION, EMULSION INTRAVENOUS
Status: DISPENSED
Start: 2018-04-27

## (undated) RX ORDER — FENTANYL CITRATE 50 UG/ML
INJECTION, SOLUTION INTRAMUSCULAR; INTRAVENOUS
Status: DISPENSED
Start: 2018-04-27

## (undated) RX ORDER — OXYCODONE HYDROCHLORIDE 5 MG/1
TABLET ORAL
Status: DISPENSED
Start: 2018-12-07

## (undated) RX ORDER — ONDANSETRON 2 MG/ML
INJECTION INTRAMUSCULAR; INTRAVENOUS
Status: DISPENSED
Start: 2018-04-27

## (undated) RX ORDER — ACETAMINOPHEN 325 MG/1
TABLET ORAL
Status: DISPENSED
Start: 2018-12-07

## (undated) RX ORDER — ONDANSETRON 2 MG/ML
INJECTION INTRAMUSCULAR; INTRAVENOUS
Status: DISPENSED
Start: 2018-12-07

## (undated) RX ORDER — DEXAMETHASONE SODIUM PHOSPHATE 4 MG/ML
INJECTION, SOLUTION INTRA-ARTICULAR; INTRALESIONAL; INTRAMUSCULAR; INTRAVENOUS; SOFT TISSUE
Status: DISPENSED
Start: 2018-12-07

## (undated) RX ORDER — BUPIVACAINE HYDROCHLORIDE 5 MG/ML
INJECTION, SOLUTION PERINEURAL
Status: DISPENSED
Start: 2018-11-29

## (undated) RX ORDER — DEXAMETHASONE SODIUM PHOSPHATE 4 MG/ML
INJECTION, SOLUTION INTRA-ARTICULAR; INTRALESIONAL; INTRAMUSCULAR; INTRAVENOUS; SOFT TISSUE
Status: DISPENSED
Start: 2018-11-29

## (undated) RX ORDER — KETOROLAC TROMETHAMINE 30 MG/ML
INJECTION, SOLUTION INTRAMUSCULAR; INTRAVENOUS
Status: DISPENSED
Start: 2018-04-27

## (undated) RX ORDER — ONDANSETRON 2 MG/ML
INJECTION INTRAMUSCULAR; INTRAVENOUS
Status: DISPENSED
Start: 2018-11-29

## (undated) RX ORDER — FENTANYL CITRATE 50 UG/ML
INJECTION, SOLUTION INTRAMUSCULAR; INTRAVENOUS
Status: DISPENSED
Start: 2018-12-07

## (undated) RX ORDER — CEFAZOLIN SODIUM 2 G/100ML
INJECTION, SOLUTION INTRAVENOUS
Status: DISPENSED
Start: 2018-11-29

## (undated) RX ORDER — HYDROCODONE BITARTRATE AND ACETAMINOPHEN 5; 325 MG/1; MG/1
TABLET ORAL
Status: DISPENSED
Start: 2018-04-27

## (undated) RX ORDER — CEFAZOLIN SODIUM 2 G/100ML
INJECTION, SOLUTION INTRAVENOUS
Status: DISPENSED
Start: 2018-12-07